# Patient Record
Sex: FEMALE | Race: WHITE | NOT HISPANIC OR LATINO | URBAN - METROPOLITAN AREA
[De-identification: names, ages, dates, MRNs, and addresses within clinical notes are randomized per-mention and may not be internally consistent; named-entity substitution may affect disease eponyms.]

---

## 2017-11-10 ENCOUNTER — INPATIENT (INPATIENT)
Facility: HOSPITAL | Age: 62
LOS: 5 days | Discharge: HOME | DRG: 808 | End: 2017-11-16
Attending: HOSPITALIST | Admitting: STUDENT IN AN ORGANIZED HEALTH CARE EDUCATION/TRAINING PROGRAM
Payer: COMMERCIAL

## 2017-11-10 VITALS — HEIGHT: 64 IN | WEIGHT: 138.01 LBS

## 2017-11-10 DIAGNOSIS — I10 ESSENTIAL (PRIMARY) HYPERTENSION: ICD-10-CM

## 2017-11-10 DIAGNOSIS — Z29.9 ENCOUNTER FOR PROPHYLACTIC MEASURES, UNSPECIFIED: ICD-10-CM

## 2017-11-10 DIAGNOSIS — E87.6 HYPOKALEMIA: ICD-10-CM

## 2017-11-10 DIAGNOSIS — D61.810 ANTINEOPLASTIC CHEMOTHERAPY INDUCED PANCYTOPENIA: ICD-10-CM

## 2017-11-10 DIAGNOSIS — C34.90 MALIGNANT NEOPLASM OF UNSPECIFIED PART OF UNSPECIFIED BRONCHUS OR LUNG: ICD-10-CM

## 2017-11-10 DIAGNOSIS — B37.0 CANDIDAL STOMATITIS: ICD-10-CM

## 2017-11-10 LAB
ALBUMIN SERPL ELPH-MCNC: 3.8 G/DL — SIGNIFICANT CHANGE UP (ref 3.3–5.2)
ALP SERPL-CCNC: 76 U/L — SIGNIFICANT CHANGE UP (ref 40–120)
ALT FLD-CCNC: 9 U/L — SIGNIFICANT CHANGE UP
ANION GAP SERPL CALC-SCNC: 15 MMOL/L — SIGNIFICANT CHANGE UP (ref 5–17)
ANION GAP SERPL CALC-SCNC: 17 MMOL/L — SIGNIFICANT CHANGE UP (ref 5–17)
ANISOCYTOSIS BLD QL: SLIGHT — SIGNIFICANT CHANGE UP
AST SERPL-CCNC: 24 U/L — SIGNIFICANT CHANGE UP
BILIRUB SERPL-MCNC: 0.6 MG/DL — SIGNIFICANT CHANGE UP (ref 0.4–2)
BLD GP AB SCN SERPL QL: SIGNIFICANT CHANGE UP
BUN SERPL-MCNC: 19 MG/DL — SIGNIFICANT CHANGE UP (ref 8–20)
BUN SERPL-MCNC: 20 MG/DL — SIGNIFICANT CHANGE UP (ref 8–20)
CALCIUM SERPL-MCNC: 9 MG/DL — SIGNIFICANT CHANGE UP (ref 8.6–10.2)
CALCIUM SERPL-MCNC: 9.8 MG/DL — SIGNIFICANT CHANGE UP (ref 8.6–10.2)
CHLORIDE SERPL-SCNC: 105 MMOL/L — SIGNIFICANT CHANGE UP (ref 98–107)
CHLORIDE SERPL-SCNC: 107 MMOL/L — SIGNIFICANT CHANGE UP (ref 98–107)
CO2 SERPL-SCNC: 23 MMOL/L — SIGNIFICANT CHANGE UP (ref 22–29)
CO2 SERPL-SCNC: 25 MMOL/L — SIGNIFICANT CHANGE UP (ref 22–29)
CREAT SERPL-MCNC: 1.51 MG/DL — HIGH (ref 0.5–1.3)
CREAT SERPL-MCNC: 1.57 MG/DL — HIGH (ref 0.5–1.3)
EOSINOPHIL NFR BLD AUTO: 4 % — SIGNIFICANT CHANGE UP (ref 0–5)
GLUCOSE SERPL-MCNC: 101 MG/DL — SIGNIFICANT CHANGE UP (ref 70–115)
GLUCOSE SERPL-MCNC: 125 MG/DL — HIGH (ref 70–115)
HCT VFR BLD CALC: 23 % — LOW (ref 37–47)
HGB BLD-MCNC: 7.9 G/DL — LOW (ref 12–16)
LACTATE BLDV-MCNC: 1.7 MMOL/L — SIGNIFICANT CHANGE UP (ref 0.5–2)
LYMPHOCYTES # BLD AUTO: 53 % — SIGNIFICANT CHANGE UP (ref 20–55)
MCHC RBC-ENTMCNC: 32.8 PG — HIGH (ref 27–31)
MCHC RBC-ENTMCNC: 34.3 G/DL — SIGNIFICANT CHANGE UP (ref 32–36)
MCV RBC AUTO: 95.4 FL — SIGNIFICANT CHANGE UP (ref 81–99)
MONOCYTES NFR BLD AUTO: 5 % — SIGNIFICANT CHANGE UP (ref 3–10)
NEUTROPHILS NFR BLD AUTO: 37 % — SIGNIFICANT CHANGE UP (ref 37–73)
OVALOCYTES BLD QL SMEAR: SLIGHT — SIGNIFICANT CHANGE UP
PLAT MORPH BLD: NORMAL — SIGNIFICANT CHANGE UP
PLATELET # BLD AUTO: 86 K/UL — LOW (ref 150–400)
POIKILOCYTOSIS BLD QL AUTO: SLIGHT — SIGNIFICANT CHANGE UP
POTASSIUM SERPL-MCNC: 2.6 MMOL/L — CRITICAL LOW (ref 3.5–5.3)
POTASSIUM SERPL-MCNC: 2.9 MMOL/L — CRITICAL LOW (ref 3.5–5.3)
POTASSIUM SERPL-SCNC: 2.6 MMOL/L — CRITICAL LOW (ref 3.5–5.3)
POTASSIUM SERPL-SCNC: 2.9 MMOL/L — CRITICAL LOW (ref 3.5–5.3)
PROT SERPL-MCNC: 6.8 G/DL — SIGNIFICANT CHANGE UP (ref 6.6–8.7)
RBC # BLD: 2.41 M/UL — LOW (ref 4.4–5.2)
RBC # FLD: 16.8 % — HIGH (ref 11–15.6)
RBC BLD AUTO: PRESENT — SIGNIFICANT CHANGE UP
SODIUM SERPL-SCNC: 145 MMOL/L — SIGNIFICANT CHANGE UP (ref 135–145)
SODIUM SERPL-SCNC: 147 MMOL/L — HIGH (ref 135–145)
TSH SERPL-MCNC: 3.27 UIU/ML — SIGNIFICANT CHANGE UP (ref 0.27–4.2)
TYPE + AB SCN PNL BLD: SIGNIFICANT CHANGE UP
VARIANT LYMPHS # BLD: 1 % — SIGNIFICANT CHANGE UP (ref 0–6)
WBC # BLD: 2.9 K/UL — LOW (ref 4.8–10.8)
WBC # FLD AUTO: 2.9 K/UL — LOW (ref 4.8–10.8)

## 2017-11-10 PROCEDURE — 99285 EMERGENCY DEPT VISIT HI MDM: CPT

## 2017-11-10 PROCEDURE — 99223 1ST HOSP IP/OBS HIGH 75: CPT | Mod: AI,GC

## 2017-11-10 RX ORDER — VANCOMYCIN HCL 1 G
1000 VIAL (EA) INTRAVENOUS ONCE
Qty: 0 | Refills: 0 | Status: COMPLETED | OUTPATIENT
Start: 2017-11-10 | End: 2017-11-10

## 2017-11-10 RX ORDER — NYSTATIN 500MM UNIT
500000 POWDER (EA) MISCELLANEOUS EVERY 6 HOURS
Qty: 0 | Refills: 0 | Status: DISCONTINUED | OUTPATIENT
Start: 2017-11-10 | End: 2017-11-11

## 2017-11-10 RX ORDER — CLOPIDOGREL BISULFATE 75 MG/1
75 TABLET, FILM COATED ORAL DAILY
Qty: 0 | Refills: 0 | Status: DISCONTINUED | OUTPATIENT
Start: 2017-11-10 | End: 2017-11-13

## 2017-11-10 RX ORDER — ATENOLOL 25 MG/1
50 TABLET ORAL
Qty: 0 | Refills: 0 | Status: DISCONTINUED | OUTPATIENT
Start: 2017-11-10 | End: 2017-11-10

## 2017-11-10 RX ORDER — LISINOPRIL 2.5 MG/1
20 TABLET ORAL DAILY
Qty: 0 | Refills: 0 | Status: DISCONTINUED | OUTPATIENT
Start: 2017-11-10 | End: 2017-11-11

## 2017-11-10 RX ORDER — ATORVASTATIN CALCIUM 80 MG/1
80 TABLET, FILM COATED ORAL AT BEDTIME
Qty: 0 | Refills: 0 | Status: DISCONTINUED | OUTPATIENT
Start: 2017-11-10 | End: 2017-11-16

## 2017-11-10 RX ORDER — ASPIRIN/CALCIUM CARB/MAGNESIUM 324 MG
81 TABLET ORAL DAILY
Qty: 0 | Refills: 0 | Status: DISCONTINUED | OUTPATIENT
Start: 2017-11-10 | End: 2017-11-13

## 2017-11-10 RX ORDER — ATENOLOL 25 MG/1
50 TABLET ORAL
Qty: 0 | Refills: 0 | Status: DISCONTINUED | OUTPATIENT
Start: 2017-11-10 | End: 2017-11-16

## 2017-11-10 RX ORDER — ONDANSETRON 8 MG/1
8 TABLET, FILM COATED ORAL
Qty: 0 | Refills: 0 | Status: DISCONTINUED | OUTPATIENT
Start: 2017-11-10 | End: 2017-11-10

## 2017-11-10 RX ORDER — POTASSIUM CHLORIDE 20 MEQ
10 PACKET (EA) ORAL ONCE
Qty: 0 | Refills: 0 | Status: COMPLETED | OUTPATIENT
Start: 2017-11-10 | End: 2017-11-10

## 2017-11-10 RX ORDER — LISINOPRIL 2.5 MG/1
20 TABLET ORAL DAILY
Qty: 0 | Refills: 0 | Status: DISCONTINUED | OUTPATIENT
Start: 2017-11-10 | End: 2017-11-10

## 2017-11-10 RX ORDER — SODIUM CHLORIDE 9 MG/ML
1000 INJECTION INTRAMUSCULAR; INTRAVENOUS; SUBCUTANEOUS
Qty: 0 | Refills: 0 | Status: DISCONTINUED | OUTPATIENT
Start: 2017-11-10 | End: 2017-11-11

## 2017-11-10 RX ORDER — ONDANSETRON 8 MG/1
4 TABLET, FILM COATED ORAL EVERY 6 HOURS
Qty: 0 | Refills: 0 | Status: DISCONTINUED | OUTPATIENT
Start: 2017-11-10 | End: 2017-11-16

## 2017-11-10 RX ORDER — OXYBUTYNIN CHLORIDE 5 MG
10 TABLET ORAL DAILY
Qty: 0 | Refills: 0 | Status: DISCONTINUED | OUTPATIENT
Start: 2017-11-10 | End: 2017-11-10

## 2017-11-10 RX ORDER — POTASSIUM CHLORIDE 20 MEQ
40 PACKET (EA) ORAL EVERY 4 HOURS
Qty: 0 | Refills: 0 | Status: COMPLETED | OUTPATIENT
Start: 2017-11-10 | End: 2017-11-11

## 2017-11-10 RX ORDER — PANTOPRAZOLE SODIUM 20 MG/1
40 TABLET, DELAYED RELEASE ORAL
Qty: 0 | Refills: 0 | Status: DISCONTINUED | OUTPATIENT
Start: 2017-11-10 | End: 2017-11-16

## 2017-11-10 RX ORDER — POTASSIUM CHLORIDE 20 MEQ
10 PACKET (EA) ORAL ONCE
Qty: 0 | Refills: 0 | Status: DISCONTINUED | OUTPATIENT
Start: 2017-11-10 | End: 2017-11-10

## 2017-11-10 RX ORDER — SODIUM CHLORIDE 9 MG/ML
1500 INJECTION INTRAMUSCULAR; INTRAVENOUS; SUBCUTANEOUS ONCE
Qty: 0 | Refills: 0 | Status: COMPLETED | OUTPATIENT
Start: 2017-11-10 | End: 2017-11-10

## 2017-11-10 RX ORDER — FOLIC ACID 0.8 MG
1 TABLET ORAL DAILY
Qty: 0 | Refills: 0 | Status: DISCONTINUED | OUTPATIENT
Start: 2017-11-10 | End: 2017-11-16

## 2017-11-10 RX ADMIN — SODIUM CHLORIDE 750 MILLILITER(S): 9 INJECTION INTRAMUSCULAR; INTRAVENOUS; SUBCUTANEOUS at 20:00

## 2017-11-10 RX ADMIN — Medication 250 MILLIGRAM(S): at 21:00

## 2017-11-10 NOTE — H&P ADULT - NSHPREVIEWOFSYSTEMS_GEN_ALL_CORE
CONSTITUTIONAL: admits weakness and generalized fatigue  HEENT: denies blurred vision, admits sore throat  SKIN: denies new lesions, rash but admits pallor  CARDIOVASCULAR: denies chest pain, chest pressure  RESPIRATORY: denies shortness of breath, sputum production  GASTROINTESTINAL: as per hpi  GENITOURINARY: denies dysuria, discharge  NEUROLOGICAL: denies numbness, headache  MUSCULOSKELETAL: generalized aches  HEMATOLOGIC: denies gross bleeding, bruising  LYMPHATICS: denies enlarged lymph nodes, extremity swelling  PSYCHIATRIC: denies recent changes in anxiety, depression  ENDOCRINOLOGIC: denies sweating, cold or heat intolerance

## 2017-11-10 NOTE — H&P ADULT - PROBLEM SELECTOR PLAN 5
Early ambulation with assistance  Pneumatic compression boots while on bed.  CrCl: 42.1 Continue home medications  Monitor BP  Dash diet Topical nystatin solution.  Soft diet Swish/swallow nystatin solution.  Soft diet.  Encourage po intake, f/u dietary recs.

## 2017-11-10 NOTE — H&P ADULT - NSHPSOCIALHISTORY_GEN_ALL_CORE
Lives with her two daughters.  Patient is a current smoker, last cigarette about 2 days ago.  Denies EtOH abuse or use of illicit drugs.

## 2017-11-10 NOTE — H&P ADULT - PROBLEM SELECTOR PLAN 3
Continue home medications  Monitor BP  Dash diet K+ repletion ordered by ED completed.  Repeat Serum K+ 2.6 mEq  K+ chloride 40mEq tablet ER q4h x3  Trend levels Repeat Serum K+ 2.6 mEq  K+ chloride 40mEq tablet ER q4h x3, pt prefers po supplementation rather than painful IV if possible. Reassess w/ BMP in the AM.  Check Mg level.   Trend levels

## 2017-11-10 NOTE — ED ADULT NURSE NOTE - OBJECTIVE STATEMENT
pt arrived from [facility] for weakness and thrush as per pt has not been feeling well very tired, pt with lung ca, sent over by md

## 2017-11-10 NOTE — H&P ADULT - NSHPLABSRESULTS_GEN_ALL_CORE
7.9    2.9   )-----------( 86       ( 10 Nov 2017 18:23 )             23.0     11-10    147<H>  |  105  |  20.0  ----------------------------<  101  2.9<LL>   |  25.0  |  1.57<H>    Ca    9.8      10 Nov 2017 18:23    TPro  6.8  /  Alb  3.8  /  TBili  0.6  /  DBili  x   /  AST  24  /  ALT  9   /  AlkPhos  76  11-10    Blood Gas Venous - Lactate: 1.7 mmoL/L (11.10.17 @ 18:16) Labs:               7.9    2.9   )-----------( 86       ( 10 Nov 2017 18:23 )             23.0     11-10    147<H>  |  105  |  20.0  ----------------------------<  101  2.9<LL>   |  25.0  |  1.57<H>    Ca    9.8      10 Nov 2017 18:23    TPro  6.8  /  Alb  3.8  /  TBili  0.6  /  DBili  x   /  AST  24  /  ALT  9   /  AlkPhos  76  11-10    Blood Gas Venous - Lactate: 1.7 mmoL/L (11.10.17 @ 18:16)

## 2017-11-10 NOTE — H&P ADULT - ATTENDING COMMENTS
I personally conducted a physical examination of the patient. I personally gathered the patient's history. I edited the above listed findings which were prepared by the listed resident physician. I personally discussed the plan of care with the patient. The questions and concerns were addressed to the best of my ability. The patient is in agreement with the listed treatment plan.     A/P: 63yo F w/ stage IV lung ca w/ brain metastasis currently undergoing chemotherapy, presents w/ reduced po intake, dehydration, symptomatic anemia. Recommend 1 unit pRBC's, unknown baseline H/H at this time, only record available from 2 years ago demonstrated hgb 14. No obvious clinical bleeding. Will hydrate and reassess need for further transfusion in the AM. Goal hgb>9.0 at this time but will attempt to get outpt labs for comparison. [facility] in [place] is outpt hem. Check iron, b12, folate in the AM.

## 2017-11-10 NOTE — H&P ADULT - ASSESSMENT
63 yo F with PMHs of HTN, CAD s/p 5 stents and Stage VI Lung Ca currently on chemotherapy with Ca-related fatigue and anemia with Hb >7.0 g/dL, dehydration secondary to poor oral intake, at the moment hemodynamically stable. Will be admitted for rehydration, consider PRBC transfusion and iron studies. Will consult Hem/Onc for recommendations. 61 yo F with PMHs of HTN, CAD s/p 5 stents and Stage VI Lung Ca currently on chemotherapy with Ca-related fatigue and anemia with Hb >7.0 g/dL, dehydration secondary to poor oral intake.

## 2017-11-10 NOTE — H&P ADULT - PROBLEM SELECTOR PLAN 6
Early ambulation with assistance  Pneumatic compression boots while on bed.  CrCl: 42.1 Continue home medications with holding parameters.  Dash diet

## 2017-11-10 NOTE — H&P ADULT - HISTORY OF PRESENT ILLNESS
61 yo F with PMHx of Hypertension, CAD and Lung Ca Stage IV with brain metastasis s/p radiation therapy on 08/2017 and on chemotherapy (last dose last week), complains of approximately 2 weeks of noticing progressive fatigue, being unable to ambulate more than a few feet without becoming short of breath accompanied by nausea, lack of appetite with decreased PO intake and occasional dysphagia.  She denies any chest pain, palpitations, dizziness, lightheadedness, cough, fever, dysuria, diarrhea, vomiting, hematuria, hematochezia, melena or easy bruising. 63 yo F with PMHx of Hypertension, CAD and Lung Ca Stage IV (diagnosed in 06/2017) with brain metastasis s/p radiation therapy, last session on 08/2017 and on chemotherapy (last session last week), patient receiving chemotherapy every 3 weeks (1 day each time), reports good tolerance. Complains of approximately 2 weeks of noticing progressive fatigue, being unable to ambulate more than a few feet without becoming short of breath accompanied by nausea, lack of appetite with decreased PO intake and occasional dysphagia.  She denies any chest pain, palpitations, dizziness, lightheadedness, cough, fever, dysuria, diarrhea, vomiting, hematuria, hematochezia, melena or easy bruising. 63 yo F with PMHx of Hypertension, CAD s/p PCI (2008) and Lung Ca Stage IV (diagnosed in 06/2017) with brain metastasis s/p radiation therapy, last session on 08/2017 and on chemotherapy (last session last week), patient receiving chemotherapy every 3 weeks (1 day each time), reports good tolerance to chemo. Complains of approximately 2 weeks of noticing progressive fatigue, being unable to ambulate more than a few feet without becoming short of breath accompanied by nausea, lack of appetite with decreased PO intake and occasional dysphagia.  She denies any chest pain, palpitations, dizziness, lightheadedness, cough, fever, dysuria, diarrhea, vomiting, hematuria, hematochezia, melena or easy bruising. 61 yo F with PMHx of Hypertension, CAD s/p PCI (2008) and Lung Ca Stage IV (diagnosed in 06/2017) with brain metastasis s/p radiation therapy, last session on 08/2017 and on chemotherapy (last session last week), patient receiving chemotherapy every 3 weeks (1 day each time), reports good tolerance to chemo. Complains of approximately 2 weeks of noticing progressive fatigue, being unable to ambulate more than a few feet without becoming short of breath accompanied by nausea, lack of appetite with decreased PO intake and occasional dysphagia.  She denies any chest pain, palpitations, dizziness, lightheadedness, cough, fever, dysuria, diarrhea, vomiting, hematuria, hematochezia, melena or easy bruising.     In the ED, pt was given 1g of vancomycin and 1.5L NS bolus.     PSHx: PCI in 2008

## 2017-11-10 NOTE — ED PROVIDER NOTE - CARE PLAN
Principal Discharge DX:	Malignant neoplasm of lung, unspecified laterality, unspecified part of lung

## 2017-11-10 NOTE — H&P ADULT - NSHPPHYSICALEXAM_GEN_ALL_CORE
Vital Signs Last 24 Hrs  T(C): 36.7 (10 Nov 2017 16:07), Max: 36.7 (10 Nov 2017 16:07)  T(F): 98 (10 Nov 2017 16:07), Max: 98 (10 Nov 2017 16:07)  HR: 65 (10 Nov 2017 16:07) (65 - 65)  BP: 98/65 (10 Nov 2017 16:07) (98/65 - 98/65)  BP(mean): --  RR: 18 (10 Nov 2017 16:07) (18 - 18)  SpO2: 100% (10 Nov 2017 16:07) (100% - 100%)    General: Elderly patient lying on stretcher with backboard at 45 degrees, in no acute distress, pleasant.  HEENT: Head: Normocephalic, atraumatic. Eyes: PERRL, EOMI, Nose: Nostrils are patent, no discharge and no hyperemia or hypertrophy of turbinates. Neck: Supple, no JVD, no carotid bruits, no lymphadenopathy.  Respiratory: Normal respiratory rate and effort, decreased breath sounds, no wheezing, rales or crackles.  GI: (+) bowel sounds, soft, nontender, nondistended, no organomegaly or masses.  : No CVA tenderness.  Extremities: No cyanosis, trace pedal edema, no joint tenderness, capillary refill <2s.  Neuro: Alert and oriented x3 No focal deficits.  Psych: Normal speech and affect, good eye contact. Vital Signs Last 24 Hrs  T(C): 36.7 (10 Nov 2017 16:07), Max: 36.7 (10 Nov 2017 16:07)  T(F): 98 (10 Nov 2017 16:07), Max: 98 (10 Nov 2017 16:07)  HR: 65 (10 Nov 2017 16:07) (65 - 65)  BP: 98/65 (10 Nov 2017 16:07) (98/65 - 98/65)  BP(mean): --  RR: 18 (10 Nov 2017 16:07) (18 - 18)  SpO2: 100% (10 Nov 2017 16:07) (100% - 100%)    General: Elderly patient lying on stretcher with backboard at 45 degrees, in no acute distress, pleasant.  HEENT: Head: Normocephalic, atraumatic. Eyes: PERRL, EOMI, Nose: Nostrils are patent, no discharge and no hyperemia or hypertrophy of turbinates. Throat: Thrush present on the dorsum of tongue and pharynx. Neck: Supple, no JVD, no carotid bruits, no lymphadenopathy.  Respiratory: Normal respiratory rate and effort, decreased breath sounds, no wheezing, rales or crackles.  GI: (+) bowel sounds, soft, nontender, nondistended, no organomegaly or masses.  : No CVA tenderness.  Extremities: No cyanosis, trace pedal edema, no joint tenderness, capillary refill <2s.  Neuro: Alert and oriented x3 No focal deficits.  Psych: Normal speech and affect, good eye contact. Vital Signs Last 24 Hrs  T(C): 36.7 (10 Nov 2017 16:07), Max: 36.7 (10 Nov 2017 16:07)  T(F): 98 (10 Nov 2017 16:07), Max: 98 (10 Nov 2017 16:07)  HR: 65 (10 Nov 2017 16:07) (65 - 65)  BP: 98/65 (10 Nov 2017 16:07) (98/65 - 98/65)  BP(mean): --  RR: 18 (10 Nov 2017 16:07) (18 - 18)  SpO2: 100% (10 Nov 2017 16:07) (100% - 100%)    General: Elderly patient lying on stretcher with backboard at 45 degrees, in no acute distress, pleasant.  HEENT: Head: Normocephalic, atraumatic. Eyes: PERRL, EOMI, Nose: Nostrils are patent, no discharge and no hyperemia or hypertrophy of turbinates. Throat: Thrush present on the dorsum of tongue and pharynx. Neck: Supple, no JVD, no carotid bruits, no lymphadenopathy.  Respiratory: Normal respiratory rate and effort, decreased breath sounds, no wheezing, rales or crackles.  GI: active bowel sounds, soft, nontender, nondistended, no organomegaly or masses.  : No CVA tenderness.  Extremities: No cyanosis, trace pedal edema, no joint tenderness, capillary refill <2s.  Neuro: Alert and oriented x3 No focal deficits.  Psych: Normal speech and affect, good eye contact.

## 2017-11-10 NOTE — H&P ADULT - PROBLEM SELECTOR PLAN 7
Early ambulation with assistance  Pneumatic compression boots while on bed.  CrCl: 42.1 Early ambulation with assistance  Pneumatic compression boots while on bed.  GI ppx

## 2017-11-10 NOTE — ED PROVIDER NOTE - OBJECTIVE STATEMENT
61 yo female pmh lung cancer with mets to brain s/p chemotherapy comes to ed with decreased oral intake for weeks; pt sent from [facility] in [place] with dehydration and low wbc; pt last chemotherapy  one week ago;

## 2017-11-10 NOTE — H&P ADULT - PROBLEM SELECTOR PLAN 4
K+ repletion ordered by ED passing at the moment.  Repeat Serum K+ Topical nystatin solution. Likely due to dehydration secondary to decreased PO intake.  Cr level decreasing after initial bolus  Continue IV hydration with normal saline.

## 2017-11-10 NOTE — H&P ADULT - PROBLEM SELECTOR PLAN 1
Patient with ongoing chemotherapy.  Hematology/Oncology consult.  Nutrition Consult Admit to medicine under hospitalist medical service.  Patient with ongoing chemotherapy.  Hematology/Oncology consult.  Nutrition Consult.  Present symptoms likely result of chemotherapy.

## 2017-11-11 DIAGNOSIS — N17.9 ACUTE KIDNEY FAILURE, UNSPECIFIED: ICD-10-CM

## 2017-11-11 LAB
ABO RH CONFIRMATION: SIGNIFICANT CHANGE UP
ANION GAP SERPL CALC-SCNC: 17 MMOL/L — SIGNIFICANT CHANGE UP (ref 5–17)
APPEARANCE UR: CLEAR — SIGNIFICANT CHANGE UP
BACTERIA # UR AUTO: ABNORMAL
BILIRUB UR-MCNC: NEGATIVE — SIGNIFICANT CHANGE UP
BUN SERPL-MCNC: 17 MG/DL — SIGNIFICANT CHANGE UP (ref 8–20)
CALCIUM SERPL-MCNC: 8.7 MG/DL — SIGNIFICANT CHANGE UP (ref 8.6–10.2)
CHLORIDE SERPL-SCNC: 109 MMOL/L — HIGH (ref 98–107)
CO2 SERPL-SCNC: 20 MMOL/L — LOW (ref 22–29)
COLOR SPEC: ABNORMAL
COMMENT - URINE: SIGNIFICANT CHANGE UP
CREAT SERPL-MCNC: 1.5 MG/DL — HIGH (ref 0.5–1.3)
DIFF PNL FLD: ABNORMAL
EPI CELLS # UR: SIGNIFICANT CHANGE UP
FERRITIN SERPL-MCNC: 800.3 NG/ML — HIGH (ref 11–306)
FOLATE SERPL-MCNC: 14.2 NG/ML — SIGNIFICANT CHANGE UP (ref 4–16)
GLUCOSE SERPL-MCNC: 94 MG/DL — SIGNIFICANT CHANGE UP (ref 70–115)
GLUCOSE UR QL: NEGATIVE MG/DL — SIGNIFICANT CHANGE UP
HCT VFR BLD CALC: 22.8 % — LOW (ref 37–47)
HGB BLD-MCNC: 7.9 G/DL — LOW (ref 12–16)
IRON SATN MFR SERPL: 174 UG/DL — HIGH (ref 37–145)
IRON SATN MFR SERPL: 89 % — HIGH (ref 14–50)
KETONES UR-MCNC: NEGATIVE — SIGNIFICANT CHANGE UP
LEUKOCYTE ESTERASE UR-ACNC: NEGATIVE — SIGNIFICANT CHANGE UP
MAGNESIUM SERPL-MCNC: 1.8 MG/DL — SIGNIFICANT CHANGE UP (ref 1.6–2.6)
MCHC RBC-ENTMCNC: 32 PG — HIGH (ref 27–31)
MCHC RBC-ENTMCNC: 34.6 G/DL — SIGNIFICANT CHANGE UP (ref 32–36)
MCV RBC AUTO: 92.3 FL — SIGNIFICANT CHANGE UP (ref 81–99)
NITRITE UR-MCNC: NEGATIVE — SIGNIFICANT CHANGE UP
PH UR: 7 — SIGNIFICANT CHANGE UP (ref 5–8)
PHOSPHATE SERPL-MCNC: 2.6 MG/DL — SIGNIFICANT CHANGE UP (ref 2.4–4.7)
PLATELET # BLD AUTO: 50 K/UL — LOW (ref 150–400)
POTASSIUM SERPL-MCNC: 3 MMOL/L — LOW (ref 3.5–5.3)
POTASSIUM SERPL-MCNC: 4 MMOL/L — SIGNIFICANT CHANGE UP (ref 3.5–5.3)
POTASSIUM SERPL-SCNC: 3 MMOL/L — LOW (ref 3.5–5.3)
POTASSIUM SERPL-SCNC: 4 MMOL/L — SIGNIFICANT CHANGE UP (ref 3.5–5.3)
PROT UR-MCNC: 100 MG/DL
RBC # BLD: 2.47 M/UL — LOW (ref 4.4–5.2)
RBC # FLD: 18.8 % — HIGH (ref 11–15.6)
RBC CASTS # UR COMP ASSIST: ABNORMAL /HPF (ref 0–4)
SODIUM SERPL-SCNC: 146 MMOL/L — HIGH (ref 135–145)
SP GR SPEC: 1 — LOW (ref 1.01–1.02)
TIBC SERPL-MCNC: 196 UG/DL — LOW (ref 220–430)
TRANSFERRIN SERPL-MCNC: 137 MG/DL — LOW (ref 192–382)
UROBILINOGEN FLD QL: NEGATIVE MG/DL — SIGNIFICANT CHANGE UP
VIT B12 SERPL-MCNC: 704 PG/ML — SIGNIFICANT CHANGE UP (ref 180–914)
WBC # BLD: 2.6 K/UL — LOW (ref 4.8–10.8)
WBC # FLD AUTO: 2.6 K/UL — LOW (ref 4.8–10.8)
WBC UR QL: SIGNIFICANT CHANGE UP

## 2017-11-11 PROCEDURE — 99233 SBSQ HOSP IP/OBS HIGH 50: CPT

## 2017-11-11 PROCEDURE — 93010 ELECTROCARDIOGRAM REPORT: CPT

## 2017-11-11 PROCEDURE — 71010: CPT | Mod: 26

## 2017-11-11 RX ORDER — FLUCONAZOLE 150 MG/1
200 TABLET ORAL ONCE
Qty: 0 | Refills: 0 | Status: COMPLETED | OUTPATIENT
Start: 2017-11-11 | End: 2017-11-11

## 2017-11-11 RX ORDER — LISINOPRIL 2.5 MG/1
20 TABLET ORAL ONCE
Qty: 0 | Refills: 0 | Status: COMPLETED | OUTPATIENT
Start: 2017-11-11 | End: 2017-11-11

## 2017-11-11 RX ORDER — LISINOPRIL 2.5 MG/1
40 TABLET ORAL DAILY
Qty: 0 | Refills: 0 | Status: DISCONTINUED | OUTPATIENT
Start: 2017-11-12 | End: 2017-11-16

## 2017-11-11 RX ORDER — FLUCONAZOLE 150 MG/1
100 TABLET ORAL DAILY
Qty: 0 | Refills: 0 | Status: DISCONTINUED | OUTPATIENT
Start: 2017-11-12 | End: 2017-11-16

## 2017-11-11 RX ORDER — POTASSIUM CHLORIDE 20 MEQ
40 PACKET (EA) ORAL EVERY 4 HOURS
Qty: 0 | Refills: 0 | Status: COMPLETED | OUTPATIENT
Start: 2017-11-11 | End: 2017-11-12

## 2017-11-11 RX ORDER — HYDRALAZINE HCL 50 MG
5 TABLET ORAL ONCE
Qty: 0 | Refills: 0 | Status: COMPLETED | OUTPATIENT
Start: 2017-11-11 | End: 2017-11-11

## 2017-11-11 RX ORDER — POTASSIUM CHLORIDE 20 MEQ
10 PACKET (EA) ORAL
Qty: 0 | Refills: 0 | Status: COMPLETED | OUTPATIENT
Start: 2017-11-11 | End: 2017-11-11

## 2017-11-11 RX ADMIN — LISINOPRIL 20 MILLIGRAM(S): 2.5 TABLET ORAL at 06:09

## 2017-11-11 RX ADMIN — FLUCONAZOLE 200 MILLIGRAM(S): 150 TABLET ORAL at 13:20

## 2017-11-11 RX ADMIN — ATENOLOL 50 MILLIGRAM(S): 25 TABLET ORAL at 06:09

## 2017-11-11 RX ADMIN — Medication 81 MILLIGRAM(S): at 12:17

## 2017-11-11 RX ADMIN — LISINOPRIL 20 MILLIGRAM(S): 2.5 TABLET ORAL at 12:17

## 2017-11-11 RX ADMIN — Medication 40 MILLIEQUIVALENT(S): at 06:09

## 2017-11-11 RX ADMIN — ATENOLOL 50 MILLIGRAM(S): 25 TABLET ORAL at 18:35

## 2017-11-11 RX ADMIN — Medication 100 MILLIEQUIVALENT(S): at 14:48

## 2017-11-11 RX ADMIN — ATORVASTATIN CALCIUM 80 MILLIGRAM(S): 80 TABLET, FILM COATED ORAL at 21:29

## 2017-11-11 RX ADMIN — ONDANSETRON 4 MILLIGRAM(S): 8 TABLET, FILM COATED ORAL at 00:10

## 2017-11-11 RX ADMIN — Medication 40 MILLIEQUIVALENT(S): at 21:29

## 2017-11-11 RX ADMIN — PANTOPRAZOLE SODIUM 40 MILLIGRAM(S): 20 TABLET, DELAYED RELEASE ORAL at 06:09

## 2017-11-11 RX ADMIN — Medication 40 MILLIEQUIVALENT(S): at 16:52

## 2017-11-11 RX ADMIN — Medication 5 MILLIGRAM(S): at 14:46

## 2017-11-11 RX ADMIN — Medication 1 MILLIGRAM(S): at 12:18

## 2017-11-11 RX ADMIN — Medication 100 MILLIEQUIVALENT(S): at 16:44

## 2017-11-11 RX ADMIN — Medication 100 MILLIEQUIVALENT(S): at 18:31

## 2017-11-11 RX ADMIN — Medication 40 MILLIEQUIVALENT(S): at 09:37

## 2017-11-11 RX ADMIN — SODIUM CHLORIDE 100 MILLILITER(S): 9 INJECTION INTRAMUSCULAR; INTRAVENOUS; SUBCUTANEOUS at 00:16

## 2017-11-11 RX ADMIN — CLOPIDOGREL BISULFATE 75 MILLIGRAM(S): 75 TABLET, FILM COATED ORAL at 12:17

## 2017-11-11 RX ADMIN — Medication 40 MILLIEQUIVALENT(S): at 01:21

## 2017-11-12 LAB
ANION GAP SERPL CALC-SCNC: 16 MMOL/L — SIGNIFICANT CHANGE UP (ref 5–17)
BUN SERPL-MCNC: 14 MG/DL — SIGNIFICANT CHANGE UP (ref 8–20)
CALCIUM SERPL-MCNC: 9.2 MG/DL — SIGNIFICANT CHANGE UP (ref 8.6–10.2)
CHLORIDE SERPL-SCNC: 114 MMOL/L — HIGH (ref 98–107)
CO2 SERPL-SCNC: 19 MMOL/L — LOW (ref 22–29)
CREAT SERPL-MCNC: 1.36 MG/DL — HIGH (ref 0.5–1.3)
CULTURE RESULTS: NO GROWTH — SIGNIFICANT CHANGE UP
GLUCOSE SERPL-MCNC: 82 MG/DL — SIGNIFICANT CHANGE UP (ref 70–115)
HCT VFR BLD CALC: 29 % — LOW (ref 37–47)
HGB BLD-MCNC: 10.1 G/DL — LOW (ref 12–16)
MAGNESIUM SERPL-MCNC: 1.7 MG/DL — SIGNIFICANT CHANGE UP (ref 1.6–2.6)
MCHC RBC-ENTMCNC: 32 PG — HIGH (ref 27–31)
MCHC RBC-ENTMCNC: 34.8 G/DL — SIGNIFICANT CHANGE UP (ref 32–36)
MCV RBC AUTO: 91.8 FL — SIGNIFICANT CHANGE UP (ref 81–99)
PHOSPHATE SERPL-MCNC: 1.7 MG/DL — LOW (ref 2.4–4.7)
PLATELET # BLD AUTO: 40 K/UL — LOW (ref 150–400)
POTASSIUM SERPL-MCNC: 4.3 MMOL/L — SIGNIFICANT CHANGE UP (ref 3.5–5.3)
POTASSIUM SERPL-SCNC: 4.3 MMOL/L — SIGNIFICANT CHANGE UP (ref 3.5–5.3)
RBC # BLD: 3.16 M/UL — LOW (ref 4.4–5.2)
RBC # FLD: 20.4 % — HIGH (ref 11–15.6)
SODIUM SERPL-SCNC: 149 MMOL/L — HIGH (ref 135–145)
SPECIMEN SOURCE: SIGNIFICANT CHANGE UP
WBC # BLD: 2.4 K/UL — LOW (ref 4.8–10.8)
WBC # FLD AUTO: 2.4 K/UL — LOW (ref 4.8–10.8)

## 2017-11-12 PROCEDURE — 99233 SBSQ HOSP IP/OBS HIGH 50: CPT

## 2017-11-12 PROCEDURE — 76775 US EXAM ABDO BACK WALL LIM: CPT | Mod: 26

## 2017-11-12 RX ORDER — HYDRALAZINE HCL 50 MG
10 TABLET ORAL ONCE
Qty: 0 | Refills: 0 | Status: COMPLETED | OUTPATIENT
Start: 2017-11-12 | End: 2017-11-12

## 2017-11-12 RX ORDER — SODIUM CHLORIDE 9 MG/ML
1000 INJECTION, SOLUTION INTRAVENOUS
Qty: 0 | Refills: 0 | Status: DISCONTINUED | OUTPATIENT
Start: 2017-11-12 | End: 2017-11-13

## 2017-11-12 RX ORDER — SODIUM,POTASSIUM PHOSPHATES 278-250MG
1 POWDER IN PACKET (EA) ORAL ONCE
Qty: 0 | Refills: 0 | Status: DISCONTINUED | OUTPATIENT
Start: 2017-11-12 | End: 2017-11-12

## 2017-11-12 RX ORDER — SODIUM,POTASSIUM PHOSPHATES 278-250MG
1 POWDER IN PACKET (EA) ORAL ONCE
Qty: 0 | Refills: 0 | Status: COMPLETED | OUTPATIENT
Start: 2017-11-12 | End: 2017-11-12

## 2017-11-12 RX ADMIN — Medication 1 MILLIGRAM(S): at 11:28

## 2017-11-12 RX ADMIN — Medication 81 MILLIGRAM(S): at 11:28

## 2017-11-12 RX ADMIN — CLOPIDOGREL BISULFATE 75 MILLIGRAM(S): 75 TABLET, FILM COATED ORAL at 11:28

## 2017-11-12 RX ADMIN — Medication 1 TABLET(S): at 11:28

## 2017-11-12 RX ADMIN — ATORVASTATIN CALCIUM 80 MILLIGRAM(S): 80 TABLET, FILM COATED ORAL at 22:10

## 2017-11-12 RX ADMIN — LISINOPRIL 40 MILLIGRAM(S): 2.5 TABLET ORAL at 05:31

## 2017-11-12 RX ADMIN — SODIUM CHLORIDE 100 MILLILITER(S): 9 INJECTION, SOLUTION INTRAVENOUS at 10:50

## 2017-11-12 RX ADMIN — FLUCONAZOLE 100 MILLIGRAM(S): 150 TABLET ORAL at 11:28

## 2017-11-12 RX ADMIN — ATENOLOL 50 MILLIGRAM(S): 25 TABLET ORAL at 18:18

## 2017-11-12 RX ADMIN — Medication 40 MILLIEQUIVALENT(S): at 05:31

## 2017-11-12 RX ADMIN — PANTOPRAZOLE SODIUM 40 MILLIGRAM(S): 20 TABLET, DELAYED RELEASE ORAL at 05:31

## 2017-11-12 RX ADMIN — SODIUM CHLORIDE 100 MILLILITER(S): 9 INJECTION, SOLUTION INTRAVENOUS at 22:10

## 2017-11-12 RX ADMIN — ATENOLOL 50 MILLIGRAM(S): 25 TABLET ORAL at 05:31

## 2017-11-12 RX ADMIN — Medication 10 MILLIGRAM(S): at 09:03

## 2017-11-13 LAB
ANION GAP SERPL CALC-SCNC: 14 MMOL/L — SIGNIFICANT CHANGE UP (ref 5–17)
BUN SERPL-MCNC: 11 MG/DL — SIGNIFICANT CHANGE UP (ref 8–20)
CALCIUM SERPL-MCNC: 8.8 MG/DL — SIGNIFICANT CHANGE UP (ref 8.6–10.2)
CHLORIDE SERPL-SCNC: 110 MMOL/L — HIGH (ref 98–107)
CO2 SERPL-SCNC: 21 MMOL/L — LOW (ref 22–29)
CREAT SERPL-MCNC: 1.27 MG/DL — SIGNIFICANT CHANGE UP (ref 0.5–1.3)
GLUCOSE SERPL-MCNC: 80 MG/DL — SIGNIFICANT CHANGE UP (ref 70–115)
HCT VFR BLD CALC: 26.9 % — LOW (ref 37–47)
HGB BLD-MCNC: 8.8 G/DL — LOW (ref 12–16)
MAGNESIUM SERPL-MCNC: 1.4 MG/DL — LOW (ref 1.6–2.6)
MCHC RBC-ENTMCNC: 30.1 PG — SIGNIFICANT CHANGE UP (ref 27–31)
MCHC RBC-ENTMCNC: 32.7 G/DL — SIGNIFICANT CHANGE UP (ref 32–36)
MCV RBC AUTO: 92.1 FL — SIGNIFICANT CHANGE UP (ref 81–99)
PHOSPHATE SERPL-MCNC: 2.4 MG/DL — SIGNIFICANT CHANGE UP (ref 2.4–4.7)
PLATELET # BLD AUTO: 25 K/UL — CRITICAL LOW (ref 150–400)
POTASSIUM SERPL-MCNC: 3.5 MMOL/L — SIGNIFICANT CHANGE UP (ref 3.5–5.3)
POTASSIUM SERPL-SCNC: 3.5 MMOL/L — SIGNIFICANT CHANGE UP (ref 3.5–5.3)
RBC # BLD: 2.92 M/UL — LOW (ref 4.4–5.2)
RBC # FLD: 19.3 % — HIGH (ref 11–15.6)
SODIUM SERPL-SCNC: 145 MMOL/L — SIGNIFICANT CHANGE UP (ref 135–145)
WBC # BLD: 1.7 K/UL — LOW (ref 4.8–10.8)
WBC # FLD AUTO: 1.7 K/UL — LOW (ref 4.8–10.8)

## 2017-11-13 PROCEDURE — 99233 SBSQ HOSP IP/OBS HIGH 50: CPT

## 2017-11-13 RX ORDER — MAGNESIUM OXIDE 400 MG ORAL TABLET 241.3 MG
400 TABLET ORAL
Qty: 0 | Refills: 0 | Status: COMPLETED | OUTPATIENT
Start: 2017-11-13 | End: 2017-11-14

## 2017-11-13 RX ADMIN — ATENOLOL 50 MILLIGRAM(S): 25 TABLET ORAL at 06:40

## 2017-11-13 RX ADMIN — ONDANSETRON 4 MILLIGRAM(S): 8 TABLET, FILM COATED ORAL at 15:42

## 2017-11-13 RX ADMIN — MAGNESIUM OXIDE 400 MG ORAL TABLET 400 MILLIGRAM(S): 241.3 TABLET ORAL at 17:22

## 2017-11-13 RX ADMIN — ATORVASTATIN CALCIUM 80 MILLIGRAM(S): 80 TABLET, FILM COATED ORAL at 21:14

## 2017-11-13 RX ADMIN — ATENOLOL 50 MILLIGRAM(S): 25 TABLET ORAL at 17:22

## 2017-11-13 RX ADMIN — MAGNESIUM OXIDE 400 MG ORAL TABLET 400 MILLIGRAM(S): 241.3 TABLET ORAL at 12:56

## 2017-11-13 RX ADMIN — PANTOPRAZOLE SODIUM 40 MILLIGRAM(S): 20 TABLET, DELAYED RELEASE ORAL at 06:40

## 2017-11-13 RX ADMIN — FLUCONAZOLE 100 MILLIGRAM(S): 150 TABLET ORAL at 12:56

## 2017-11-13 RX ADMIN — LISINOPRIL 40 MILLIGRAM(S): 2.5 TABLET ORAL at 06:41

## 2017-11-13 RX ADMIN — Medication 1 MILLIGRAM(S): at 12:56

## 2017-11-13 NOTE — PHYSICAL THERAPY INITIAL EVALUATION ADULT - GAIT PATTERN USED, PT EVAL
trial without device c notable unsteadiness c A to recover, decreased gait velocity and activity tolerance

## 2017-11-13 NOTE — DIETITIAN INITIAL EVALUATION ADULT. - PROBLEM SELECTOR PLAN 2
stat 1 unti PRBC transfusion. Pt consented for transfusion.   Neutrophil count > 1,000 cells/uL  No neutropenic precautions at this time.   F/u CBC in AM.  Iron panel, B12, folate in the AM.   Maintain Plt >20k

## 2017-11-13 NOTE — PHYSICAL THERAPY INITIAL EVALUATION ADULT - PERTINENT HX OF CURRENT PROBLEM, REHAB EVAL
pt presents to [facility] due to symptomatic anemia, pancytopenia, hx of brain mets

## 2017-11-13 NOTE — DIETITIAN INITIAL EVALUATION ADULT. - PROBLEM SELECTOR PLAN 3
Repeat Serum K+ 2.6 mEq  K+ chloride 40mEq tablet ER q4h x3, pt prefers po supplementation rather than painful IV if possible. Reassess w/ BMP in the AM.  Check Mg level.   Trend levels

## 2017-11-13 NOTE — PHYSICAL THERAPY INITIAL EVALUATION ADULT - CRITERIA FOR SKILLED THERAPEUTIC INTERVENTIONS
rehab potential/predicted duration of therapy intervention/anticipated equipment needs at discharge/impairments found/therapy frequency/functional limitations in following categories/anticipated discharge recommendation

## 2017-11-13 NOTE — DIETITIAN INITIAL EVALUATION ADULT. - PROBLEM SELECTOR PLAN 4
Likely due to dehydration secondary to decreased PO intake.  Cr level decreasing after initial bolus  Continue IV hydration with normal saline.

## 2017-11-13 NOTE — CHART NOTE - NSCHARTNOTEFT_GEN_A_CORE
Upon Nutritional Assessment by the Registered Dietitian your patient was determined to meet criteria / has evidence of the following diagnosis/diagnoses:          [ ]  Mild Protein Calorie Malnutrition        [ ]  Moderate Protein Calorie Malnutrition        [x ] Severe Protein Calorie Malnutrition        [ ] Unspecified Protein Calorie Malnutrition        [ ] Underweight / BMI <19        [ ] Morbid Obesity / BMI > 40      Findings as based on:  •  Comprehensive nutrition assessment and consultation  •  Calorie counts (nutrient intake analysis)  •  Food acceptance and intake status from observations by staff  •  Follow up  •  Patient education  •  Intervention secondary to interdisciplinary rounds  •   concerns      Treatment:    The following diet has been recommended:    1) appetite stimulant marinol?  2)    PROVIDER Section:     By signing this assessment you are acknowledging and agree with the diagnosis/diagnoses assigned by the Registered Dietitian    Comments:

## 2017-11-14 LAB
ACANTHOCYTES BLD QL SMEAR: SLIGHT — SIGNIFICANT CHANGE UP
ANION GAP SERPL CALC-SCNC: 15 MMOL/L — SIGNIFICANT CHANGE UP (ref 5–17)
ANISOCYTOSIS BLD QL: SLIGHT — SIGNIFICANT CHANGE UP
BASOPHILS NFR BLD AUTO: 1 % — SIGNIFICANT CHANGE UP (ref 0–2)
BUN SERPL-MCNC: 11 MG/DL — SIGNIFICANT CHANGE UP (ref 8–20)
CALCIUM SERPL-MCNC: 9 MG/DL — SIGNIFICANT CHANGE UP (ref 8.6–10.2)
CHLORIDE SERPL-SCNC: 105 MMOL/L — SIGNIFICANT CHANGE UP (ref 98–107)
CO2 SERPL-SCNC: 23 MMOL/L — SIGNIFICANT CHANGE UP (ref 22–29)
CREAT SERPL-MCNC: 1.35 MG/DL — HIGH (ref 0.5–1.3)
ELLIPTOCYTES BLD QL SMEAR: SLIGHT — SIGNIFICANT CHANGE UP
EOSINOPHIL # BLD AUTO: 0.1 K/UL — SIGNIFICANT CHANGE UP (ref 0–0.5)
EOSINOPHIL NFR BLD AUTO: 5 % — SIGNIFICANT CHANGE UP (ref 0–5)
GLUCOSE SERPL-MCNC: 87 MG/DL — SIGNIFICANT CHANGE UP (ref 70–115)
HCT VFR BLD CALC: 24.5 % — LOW (ref 37–47)
HCT VFR BLD CALC: 25.1 % — LOW (ref 37–47)
HGB BLD-MCNC: 8.5 G/DL — LOW (ref 12–16)
HGB BLD-MCNC: 8.5 G/DL — LOW (ref 12–16)
LYMPHOCYTES # BLD AUTO: 1.1 K/UL — SIGNIFICANT CHANGE UP (ref 1–4.8)
LYMPHOCYTES # BLD AUTO: 64 % — HIGH (ref 20–55)
MACROCYTES BLD QL: SLIGHT — SIGNIFICANT CHANGE UP
MAGNESIUM SERPL-MCNC: 1.3 MG/DL — LOW (ref 1.6–2.6)
MCHC RBC-ENTMCNC: 31 PG — SIGNIFICANT CHANGE UP (ref 27–31)
MCHC RBC-ENTMCNC: 31 PG — SIGNIFICANT CHANGE UP (ref 27–31)
MCHC RBC-ENTMCNC: 33.9 G/DL — SIGNIFICANT CHANGE UP (ref 32–36)
MCHC RBC-ENTMCNC: 34.7 G/DL — SIGNIFICANT CHANGE UP (ref 32–36)
MCV RBC AUTO: 89.4 FL — SIGNIFICANT CHANGE UP (ref 81–99)
MCV RBC AUTO: 91.6 FL — SIGNIFICANT CHANGE UP (ref 81–99)
MICROCYTES BLD QL: SLIGHT — SIGNIFICANT CHANGE UP
MONOCYTES # BLD AUTO: 0.2 K/UL — SIGNIFICANT CHANGE UP (ref 0–0.8)
MONOCYTES NFR BLD AUTO: 11 % — HIGH (ref 3–10)
NEUTROPHILS # BLD AUTO: 0.2 K/UL — LOW (ref 1.8–8)
NEUTROPHILS NFR BLD AUTO: 17 % — LOW (ref 37–73)
OVALOCYTES BLD QL SMEAR: SLIGHT — SIGNIFICANT CHANGE UP
PHOSPHATE SERPL-MCNC: 2.6 MG/DL — SIGNIFICANT CHANGE UP (ref 2.4–4.7)
PLAT MORPH BLD: NORMAL — SIGNIFICANT CHANGE UP
PLATELET # BLD AUTO: 18 K/UL — CRITICAL LOW (ref 150–400)
PLATELET # BLD AUTO: 25 K/UL — CRITICAL LOW (ref 150–400)
POIKILOCYTOSIS BLD QL AUTO: SLIGHT — SIGNIFICANT CHANGE UP
POTASSIUM SERPL-MCNC: 3.5 MMOL/L — SIGNIFICANT CHANGE UP (ref 3.5–5.3)
POTASSIUM SERPL-SCNC: 3.5 MMOL/L — SIGNIFICANT CHANGE UP (ref 3.5–5.3)
RBC # BLD: 2.74 M/UL — LOW (ref 4.4–5.2)
RBC # BLD: 2.74 M/UL — LOW (ref 4.4–5.2)
RBC # FLD: 18.6 % — HIGH (ref 11–15.6)
RBC # FLD: 19.1 % — HIGH (ref 11–15.6)
RBC BLD AUTO: ABNORMAL
SODIUM SERPL-SCNC: 143 MMOL/L — SIGNIFICANT CHANGE UP (ref 135–145)
SPHEROCYTES BLD QL SMEAR: SLIGHT — SIGNIFICANT CHANGE UP
VARIANT LYMPHS # BLD: 2 % — SIGNIFICANT CHANGE UP (ref 0–6)
WBC # BLD: 1.2 K/UL — LOW (ref 4.8–10.8)
WBC # BLD: 1.6 K/UL — LOW (ref 4.8–10.8)
WBC # FLD AUTO: 1.2 K/UL — LOW (ref 4.8–10.8)
WBC # FLD AUTO: 1.6 K/UL — LOW (ref 4.8–10.8)

## 2017-11-14 PROCEDURE — 99233 SBSQ HOSP IP/OBS HIGH 50: CPT

## 2017-11-14 RX ORDER — MAGNESIUM SULFATE 500 MG/ML
2 VIAL (ML) INJECTION ONCE
Qty: 0 | Refills: 0 | Status: COMPLETED | OUTPATIENT
Start: 2017-11-14 | End: 2017-11-14

## 2017-11-14 RX ADMIN — Medication 50 GRAM(S): at 11:38

## 2017-11-14 RX ADMIN — LISINOPRIL 40 MILLIGRAM(S): 2.5 TABLET ORAL at 05:10

## 2017-11-14 RX ADMIN — FLUCONAZOLE 100 MILLIGRAM(S): 150 TABLET ORAL at 11:39

## 2017-11-14 RX ADMIN — ATENOLOL 50 MILLIGRAM(S): 25 TABLET ORAL at 05:10

## 2017-11-14 RX ADMIN — MAGNESIUM OXIDE 400 MG ORAL TABLET 400 MILLIGRAM(S): 241.3 TABLET ORAL at 09:15

## 2017-11-14 RX ADMIN — ATENOLOL 50 MILLIGRAM(S): 25 TABLET ORAL at 17:23

## 2017-11-14 RX ADMIN — Medication 1 MILLIGRAM(S): at 11:38

## 2017-11-14 RX ADMIN — ATORVASTATIN CALCIUM 80 MILLIGRAM(S): 80 TABLET, FILM COATED ORAL at 21:13

## 2017-11-14 RX ADMIN — PANTOPRAZOLE SODIUM 40 MILLIGRAM(S): 20 TABLET, DELAYED RELEASE ORAL at 05:10

## 2017-11-15 LAB
ANION GAP SERPL CALC-SCNC: 13 MMOL/L — SIGNIFICANT CHANGE UP (ref 5–17)
BUN SERPL-MCNC: 10 MG/DL — SIGNIFICANT CHANGE UP (ref 8–20)
CALCIUM SERPL-MCNC: 9.3 MG/DL — SIGNIFICANT CHANGE UP (ref 8.6–10.2)
CHLORIDE SERPL-SCNC: 104 MMOL/L — SIGNIFICANT CHANGE UP (ref 98–107)
CO2 SERPL-SCNC: 25 MMOL/L — SIGNIFICANT CHANGE UP (ref 22–29)
CREAT SERPL-MCNC: 1.4 MG/DL — HIGH (ref 0.5–1.3)
CULTURE RESULTS: SIGNIFICANT CHANGE UP
CULTURE RESULTS: SIGNIFICANT CHANGE UP
GLUCOSE SERPL-MCNC: 88 MG/DL — SIGNIFICANT CHANGE UP (ref 70–115)
HCT VFR BLD CALC: 25.6 % — LOW (ref 37–47)
HGB BLD-MCNC: 8.8 G/DL — LOW (ref 12–16)
MAGNESIUM SERPL-MCNC: 1.9 MG/DL — SIGNIFICANT CHANGE UP (ref 1.6–2.6)
MCHC RBC-ENTMCNC: 30.9 PG — SIGNIFICANT CHANGE UP (ref 27–31)
MCHC RBC-ENTMCNC: 34.4 G/DL — SIGNIFICANT CHANGE UP (ref 32–36)
MCV RBC AUTO: 89.8 FL — SIGNIFICANT CHANGE UP (ref 81–99)
PHOSPHATE SERPL-MCNC: 3 MG/DL — SIGNIFICANT CHANGE UP (ref 2.4–4.7)
PLATELET # BLD AUTO: 11 K/UL — CRITICAL LOW (ref 150–400)
POTASSIUM SERPL-MCNC: 3.4 MMOL/L — LOW (ref 3.5–5.3)
POTASSIUM SERPL-SCNC: 3.4 MMOL/L — LOW (ref 3.5–5.3)
RBC # BLD: 2.85 M/UL — LOW (ref 4.4–5.2)
RBC # FLD: 18.4 % — HIGH (ref 11–15.6)
SODIUM SERPL-SCNC: 142 MMOL/L — SIGNIFICANT CHANGE UP (ref 135–145)
SPECIMEN SOURCE: SIGNIFICANT CHANGE UP
SPECIMEN SOURCE: SIGNIFICANT CHANGE UP
WBC # BLD: 1.4 K/UL — LOW (ref 4.8–10.8)
WBC # FLD AUTO: 1.4 K/UL — LOW (ref 4.8–10.8)

## 2017-11-15 PROCEDURE — 99233 SBSQ HOSP IP/OBS HIGH 50: CPT

## 2017-11-15 RX ORDER — POTASSIUM CHLORIDE 20 MEQ
10 PACKET (EA) ORAL
Qty: 0 | Refills: 0 | Status: DISCONTINUED | OUTPATIENT
Start: 2017-11-15 | End: 2017-11-15

## 2017-11-15 RX ORDER — POTASSIUM CHLORIDE 20 MEQ
40 PACKET (EA) ORAL EVERY 4 HOURS
Qty: 0 | Refills: 0 | Status: COMPLETED | OUTPATIENT
Start: 2017-11-15 | End: 2017-11-15

## 2017-11-15 RX ADMIN — PANTOPRAZOLE SODIUM 40 MILLIGRAM(S): 20 TABLET, DELAYED RELEASE ORAL at 05:10

## 2017-11-15 RX ADMIN — Medication 100 MILLIEQUIVALENT(S): at 09:59

## 2017-11-15 RX ADMIN — Medication 40 MILLIEQUIVALENT(S): at 14:52

## 2017-11-15 RX ADMIN — Medication 40 MILLIEQUIVALENT(S): at 17:31

## 2017-11-15 RX ADMIN — Medication 1 MILLIGRAM(S): at 11:59

## 2017-11-15 RX ADMIN — ATENOLOL 50 MILLIGRAM(S): 25 TABLET ORAL at 17:31

## 2017-11-15 RX ADMIN — LISINOPRIL 40 MILLIGRAM(S): 2.5 TABLET ORAL at 05:10

## 2017-11-15 RX ADMIN — Medication 40 MILLIEQUIVALENT(S): at 10:24

## 2017-11-15 RX ADMIN — FLUCONAZOLE 100 MILLIGRAM(S): 150 TABLET ORAL at 11:59

## 2017-11-15 RX ADMIN — ATORVASTATIN CALCIUM 80 MILLIGRAM(S): 80 TABLET, FILM COATED ORAL at 21:25

## 2017-11-15 RX ADMIN — ATENOLOL 50 MILLIGRAM(S): 25 TABLET ORAL at 05:10

## 2017-11-15 NOTE — CHART NOTE - NSCHARTNOTEFT_GEN_A_CORE
Source: Patient [x ]  Family [ ]   other [ ]    Current Diet: DASH/ TLC     Patient reports [ ] nausea  [ ] vomiting [ ] diarrhea [ ] constipation  [ ]chewing problems [ ] swallowing issues  [ ] other:     PO intake:  < 50% [ ]   50-75%  [ x  Enteral /Parenteral Nutrition:     Current Weight:     % Weight Change     Pertinent Medications: MEDICATIONS  (STANDING):  ATENolol  Tablet 50 milliGRAM(s) Oral two times a day  atorvastatin 80 milliGRAM(s) Oral at bedtime  fluconAZOLE   Tablet 100 milliGRAM(s) Oral daily  folic acid 1 milliGRAM(s) Oral daily  lisinopril 40 milliGRAM(s) Oral daily  pantoprazole    Tablet 40 milliGRAM(s) Oral before breakfast  potassium chloride    Tablet ER 40 milliEquivalent(s) Oral every 4 hours    MEDICATIONS  (PRN):  ondansetron Injectable 4 milliGRAM(s) IV Push every 6 hours PRN Nausea and/or Vomiting    Pertinent Labs: CBC Full  -  ( 15 Nov 2017 08:30 )  WBC Count : 1.4 K/uL  Hemoglobin : 8.8 g/dL  Hematocrit : 25.6 %  Platelet Count - Automated : 11 K/uL  Mean Cell Volume : 89.8 fl  Mean Cell Hemoglobin : 30.9 pg  Mean Cell Hemoglobin Concentration : 34.4 g/dL  Auto Neutrophil # : x  Auto Lymphocyte # : x  Auto Monocyte # : x  Auto Eosinophil # : x  Auto Basophil # : x  Auto Neutrophil % : x  Auto Lymphocyte % : x  Auto Monocyte % : x  Auto Eosinophil % : x  Auto Basophil % : x          Skin:     Nutrition focused physical exam conducted - found signs of malnutrition [ ]absent [ x]present    Subcutaneous fat loss: [x ] Orbital fat pads region, [x ]Buccal fat region, [ ]Triceps region,  [ ]Ribs region    Muscle wasting: [ ]Temples region, [ ]Clavicle region, [x ]Shoulder region, [x ]Scapula region, [ ]Interosseous region,  [ ]thigh region, [ ]Calf region    Estimated Needs:   [x ] no change since previous assessment  [ ] recalculated:     Current Nutrition Diagnosis: Pt presents at risk secondary to malnutrition, related to insufficient PO intake in the setting of Ca,  as evidenced by 17% weight loss, PO ~25%, and physical findings.  PO now improved       Recommendations:  Continue plan    Monitoring and Evaluation:   [ ] PO intake [ ] Tolerance to diet prescription [X] Weights  [X] Follow up per protocol [X] Labs:

## 2017-11-16 ENCOUNTER — TRANSCRIPTION ENCOUNTER (OUTPATIENT)
Age: 62
End: 2017-11-16

## 2017-11-16 VITALS
HEART RATE: 52 BPM | OXYGEN SATURATION: 100 % | DIASTOLIC BLOOD PRESSURE: 75 MMHG | RESPIRATION RATE: 18 BRPM | TEMPERATURE: 98 F | SYSTOLIC BLOOD PRESSURE: 140 MMHG

## 2017-11-16 LAB
ANION GAP SERPL CALC-SCNC: 13 MMOL/L — SIGNIFICANT CHANGE UP (ref 5–17)
BUN SERPL-MCNC: 7 MG/DL — LOW (ref 8–20)
CALCIUM SERPL-MCNC: 9.5 MG/DL — SIGNIFICANT CHANGE UP (ref 8.6–10.2)
CHLORIDE SERPL-SCNC: 108 MMOL/L — HIGH (ref 98–107)
CO2 SERPL-SCNC: 24 MMOL/L — SIGNIFICANT CHANGE UP (ref 22–29)
CREAT SERPL-MCNC: 1.31 MG/DL — HIGH (ref 0.5–1.3)
GLUCOSE SERPL-MCNC: 91 MG/DL — SIGNIFICANT CHANGE UP (ref 70–115)
HCT VFR BLD CALC: 27.1 % — LOW (ref 37–47)
HGB BLD-MCNC: 9.1 G/DL — LOW (ref 12–16)
MAGNESIUM SERPL-MCNC: 1.7 MG/DL — SIGNIFICANT CHANGE UP (ref 1.6–2.6)
MCHC RBC-ENTMCNC: 30.6 PG — SIGNIFICANT CHANGE UP (ref 27–31)
MCHC RBC-ENTMCNC: 33.6 G/DL — SIGNIFICANT CHANGE UP (ref 32–36)
MCV RBC AUTO: 91.2 FL — SIGNIFICANT CHANGE UP (ref 81–99)
PHOSPHATE SERPL-MCNC: 2.3 MG/DL — LOW (ref 2.4–4.7)
PLATELET # BLD AUTO: 73 K/UL — LOW (ref 150–400)
POTASSIUM SERPL-MCNC: 4.1 MMOL/L — SIGNIFICANT CHANGE UP (ref 3.5–5.3)
POTASSIUM SERPL-SCNC: 4.1 MMOL/L — SIGNIFICANT CHANGE UP (ref 3.5–5.3)
RBC # BLD: 2.97 M/UL — LOW (ref 4.4–5.2)
RBC # FLD: 18.5 % — HIGH (ref 11–15.6)
SODIUM SERPL-SCNC: 145 MMOL/L — SIGNIFICANT CHANGE UP (ref 135–145)
WBC # BLD: 1.3 K/UL — LOW (ref 4.8–10.8)
WBC # FLD AUTO: 1.3 K/UL — LOW (ref 4.8–10.8)

## 2017-11-16 PROCEDURE — 86901 BLOOD TYPING SEROLOGIC RH(D): CPT

## 2017-11-16 PROCEDURE — 99239 HOSP IP/OBS DSCHRG MGMT >30: CPT

## 2017-11-16 PROCEDURE — 82607 VITAMIN B-12: CPT

## 2017-11-16 PROCEDURE — P9016: CPT

## 2017-11-16 PROCEDURE — 86920 COMPATIBILITY TEST SPIN: CPT

## 2017-11-16 PROCEDURE — 83605 ASSAY OF LACTIC ACID: CPT

## 2017-11-16 PROCEDURE — 85027 COMPLETE CBC AUTOMATED: CPT

## 2017-11-16 PROCEDURE — 97110 THERAPEUTIC EXERCISES: CPT

## 2017-11-16 PROCEDURE — 97163 PT EVAL HIGH COMPLEX 45 MIN: CPT

## 2017-11-16 PROCEDURE — 76775 US EXAM ABDO BACK WALL LIM: CPT

## 2017-11-16 PROCEDURE — 71045 X-RAY EXAM CHEST 1 VIEW: CPT

## 2017-11-16 PROCEDURE — 83735 ASSAY OF MAGNESIUM: CPT

## 2017-11-16 PROCEDURE — 36430 TRANSFUSION BLD/BLD COMPNT: CPT

## 2017-11-16 PROCEDURE — 97116 GAIT TRAINING THERAPY: CPT

## 2017-11-16 PROCEDURE — 87086 URINE CULTURE/COLONY COUNT: CPT

## 2017-11-16 PROCEDURE — 84100 ASSAY OF PHOSPHORUS: CPT

## 2017-11-16 PROCEDURE — 83550 IRON BINDING TEST: CPT

## 2017-11-16 PROCEDURE — 84132 ASSAY OF SERUM POTASSIUM: CPT

## 2017-11-16 PROCEDURE — P9037: CPT

## 2017-11-16 PROCEDURE — 87040 BLOOD CULTURE FOR BACTERIA: CPT

## 2017-11-16 PROCEDURE — 36415 COLL VENOUS BLD VENIPUNCTURE: CPT

## 2017-11-16 PROCEDURE — 99285 EMERGENCY DEPT VISIT HI MDM: CPT | Mod: 25

## 2017-11-16 PROCEDURE — 93005 ELECTROCARDIOGRAM TRACING: CPT

## 2017-11-16 PROCEDURE — 84466 ASSAY OF TRANSFERRIN: CPT

## 2017-11-16 PROCEDURE — 82746 ASSAY OF FOLIC ACID SERUM: CPT

## 2017-11-16 PROCEDURE — 86850 RBC ANTIBODY SCREEN: CPT

## 2017-11-16 PROCEDURE — 80048 BASIC METABOLIC PNL TOTAL CA: CPT

## 2017-11-16 PROCEDURE — 81001 URINALYSIS AUTO W/SCOPE: CPT

## 2017-11-16 PROCEDURE — 84443 ASSAY THYROID STIM HORMONE: CPT

## 2017-11-16 PROCEDURE — 82728 ASSAY OF FERRITIN: CPT

## 2017-11-16 PROCEDURE — 86900 BLOOD TYPING SEROLOGIC ABO: CPT

## 2017-11-16 PROCEDURE — 80053 COMPREHEN METABOLIC PANEL: CPT

## 2017-11-16 RX ORDER — AMLODIPINE BESYLATE 2.5 MG/1
2.5 TABLET ORAL DAILY
Qty: 0 | Refills: 0 | Status: DISCONTINUED | OUTPATIENT
Start: 2017-11-16 | End: 2017-11-16

## 2017-11-16 RX ORDER — ATENOLOL 25 MG/1
1 TABLET ORAL
Qty: 0 | Refills: 0 | COMMUNITY
Start: 2017-11-16

## 2017-11-16 RX ORDER — OXYBUTYNIN CHLORIDE 5 MG
1 TABLET ORAL
Qty: 0 | Refills: 0 | COMMUNITY

## 2017-11-16 RX ORDER — AMLODIPINE BESYLATE 2.5 MG/1
1 TABLET ORAL
Qty: 30 | Refills: 0 | OUTPATIENT
Start: 2017-11-16 | End: 2017-12-16

## 2017-11-16 RX ORDER — LISINOPRIL 2.5 MG/1
1 TABLET ORAL
Qty: 30 | Refills: 0 | OUTPATIENT
Start: 2017-11-16 | End: 2017-12-16

## 2017-11-16 RX ORDER — LISINOPRIL 2.5 MG/1
1 TABLET ORAL
Qty: 0 | Refills: 0 | COMMUNITY

## 2017-11-16 RX ORDER — FOLIC ACID 0.8 MG
1 TABLET ORAL
Qty: 0 | Refills: 0 | COMMUNITY
Start: 2017-11-16

## 2017-11-16 RX ORDER — ATENOLOL 25 MG/1
1 TABLET ORAL
Qty: 0 | Refills: 0 | COMMUNITY

## 2017-11-16 RX ORDER — CLOPIDOGREL BISULFATE 75 MG/1
1 TABLET, FILM COATED ORAL
Qty: 0 | Refills: 0 | COMMUNITY

## 2017-11-16 RX ORDER — FOLIC ACID 0.8 MG
1 TABLET ORAL
Qty: 0 | Refills: 0 | COMMUNITY

## 2017-11-16 RX ADMIN — AMLODIPINE BESYLATE 2.5 MILLIGRAM(S): 2.5 TABLET ORAL at 11:32

## 2017-11-16 RX ADMIN — ATENOLOL 50 MILLIGRAM(S): 25 TABLET ORAL at 05:26

## 2017-11-16 RX ADMIN — PANTOPRAZOLE SODIUM 40 MILLIGRAM(S): 20 TABLET, DELAYED RELEASE ORAL at 05:26

## 2017-11-16 RX ADMIN — LISINOPRIL 40 MILLIGRAM(S): 2.5 TABLET ORAL at 05:26

## 2017-11-16 RX ADMIN — FLUCONAZOLE 100 MILLIGRAM(S): 150 TABLET ORAL at 11:32

## 2017-11-16 RX ADMIN — Medication 1 MILLIGRAM(S): at 11:32

## 2017-11-16 NOTE — PROGRESS NOTE ADULT - SUBJECTIVE AND OBJECTIVE BOX
DOMENIC CEBALLOS    [id number]    62y      Female    INTERVAL HPI/OVERNIGHT EVENTS: No events on. Pt doing well today.    Hospital course:  61 yo F with h/o HTN, CAD s/p PCI (2008), metastatic lung cA with brain mets s/p radiation therapy (last 8/17) and chemotherapy (last session last week) presents with worsening fatigue x 2 weeks. She reports dyspnea on exertion, and is unable to walk more than a few feet without becoming severely dyspneic. Has been having loss of appetite. In the ED, hgb 7.9 and received 1u PRBC. Repeat hgb was 7.9, and pt received additional 1u PRBC with improvement hgb to 10.1. MERCEDES improved with gentle hydration. Renal sono negative for obstruction. Noted to have asymptomatic hypertension during course, requiring hydralazine IVP. Platelets trended down to 40 and pt developed epistaxis. Pt was transfused 2u platelets. However, platelets continued to trend down.     REVIEW OF SYSTEMS:    CONSTITUTIONAL: No fever   RESPIRATORY: No cough   CARDIOVASCULAR: No chest pain     Vital Signs Last 24 Hrs  T(C): 36.6 (16 Nov 2017 08:00), Max: 36.9 (15 Nov 2017 16:40)  T(F): 97.9 (16 Nov 2017 08:00), Max: 98.5 (15 Nov 2017 16:40)  HR: 88 (16 Nov 2017 08:00) (62 - 88)  BP: 180/93 (16 Nov 2017 08:00) (140/78 - 181/88)  BP(mean): --  RR: 19 (16 Nov 2017 08:00) (18 - 19)  SpO2: 96% (16 Nov 2017 08:00) (96% - 98%)    PHYSICAL EXAM:    GENERAL: NAD   HEENT: PERRL, +EOMI, MMM  CHEST/LUNG: Clear to percussion bilaterally   HEART: S1S2+   ABDOMEN: Soft, Nontender, Nondistended; Bowel sounds present         LABS:                        9.1    1.3   )-----------( 73       ( 16 Nov 2017 08:10 )             27.1     11-16    145  |  108<H>  |  7.0<L>  ----------------------------<  91  4.1   |  24.0  |  1.31<H>    Ca    9.5      16 Nov 2017 08:12  Phos  2.3     11-16  Mg     1.7     11-16              MEDICATIONS  (STANDING):  amLODIPine   Tablet 2.5 milliGRAM(s) Oral daily  ATENolol  Tablet 50 milliGRAM(s) Oral two times a day  atorvastatin 80 milliGRAM(s) Oral at bedtime  fluconAZOLE   Tablet 100 milliGRAM(s) Oral daily  folic acid 1 milliGRAM(s) Oral daily  lisinopril 40 milliGRAM(s) Oral daily  pantoprazole    Tablet 40 milliGRAM(s) Oral before breakfast    MEDICATIONS  (PRN):  ondansetron Injectable 4 milliGRAM(s) IV Push every 6 hours PRN Nausea and/or Vomiting      RADIOLOGY & ADDITIONAL TESTS:
DOMENIC CEBALLOS    [id number]    62y      Female    INTERVAL HPI/OVERNIGHT EVENTS: Was hypertensive overnight, improved with hydralazine IVP. Continues to feel fatigued. Hgb stabilizing s/p 2u PRBC.     Hospital course:  63 yo F with h/o HTN, CAD s/p PCI (), metastatic lung cA with brain mets s/p radiation therapy (last ) and chemotherapy (last session last week) presents with worsening fatigue x 2 weeks. She reports dyspnea on exertion, and is unable to walke more than a few feet without becoming severely dyspneic. Has been having loss of appetite. In the ED, hgb 7.9 and received 1u PRBC. Repeat hgb was 7.9, and pt received additional 1u PRBC with improvement hgb to 10.1. MERCEDES improved with gentle hydration. Renal sono negative for obstruction. Noted to have asymptomatic hypertension during course, requiring hydralazine IVP.     REVIEW OF SYSTEMS:    CONSTITUTIONAL: No fever  CARDIOVASCULAR: No chest pain     Vital Signs Last 24 Hrs  T(C): 36.9 (2017 07:26), Max: 36.9 (2017 11:24)  T(F): 98.4 (2017 07:26), Max: 98.4 (2017 11:24)  HR: 70 (2017 10:50) (49 - 70)  BP: 102/56 (2017 10:50) (102/56 - 198/91)  BP(mean): --  RR: 18 (2017 10:50) (18 - 18)  SpO2: 98% (2017 10:50) (98% - 100%) RA    PHYSICAL EXAM:    GENERAL: NAD, well-groomed  HEENT: PERRL, +EOMI, +left eye subconjunctival hemorrhage  CHEST/LUNG: Clear to percussion bilaterally; No wheezing  HEART: S1S2+, Regular rate and rhythm   ABDOMEN: Soft, Nontender, Nondistended; Bowel sounds present       LABS:                        10.1   2.4   )-----------( 40       ( 2017 09:09 )             29.0     11-12    149<H>  |  114<H>  |  14.0  ----------------------------<  82  4.3   |  19.0<L>  |  1.36<H>    Ca    9.2      2017 09:09  Phos  1.7     11-12  Mg     1.7     11-12    TPro  6.8  /  Alb  3.8  /  TBili  0.6  /  DBili  x   /  AST  24  /  ALT  9   /  AlkPhos  76  11-10      Urinalysis Basic - ( 2017 10:53 )    Color: Other / Appearance: Clear / S.005 / pH: x  Gluc: x / Ketone: Negative  / Bili: Negative / Urobili: Negative mg/dL   Blood: x / Protein: 100 mg/dL / Nitrite: Negative   Leuk Esterase: Negative / RBC: 3-5 /HPF / WBC 0-2   Sq Epi: x / Non Sq Epi: Few / Bacteria: Few          MEDICATIONS  (STANDING):  aspirin  chewable 81 milliGRAM(s) Oral daily  ATENolol  Tablet 50 milliGRAM(s) Oral two times a day  atorvastatin 80 milliGRAM(s) Oral at bedtime  clopidogrel Tablet 75 milliGRAM(s) Oral daily  fluconAZOLE   Tablet 100 milliGRAM(s) Oral daily  folic acid 1 milliGRAM(s) Oral daily  lisinopril 40 milliGRAM(s) Oral daily  pantoprazole    Tablet 40 milliGRAM(s) Oral before breakfast  potassium acid phosphate/sodium acid phosphate tablet (K-PHOS No. 2) 1 Tablet(s) Oral once  sodium chloride 0.45%. 1000 milliLiter(s) (100 mL/Hr) IV Continuous <Continuous>    MEDICATIONS  (PRN):  ondansetron Injectable 4 milliGRAM(s) IV Push every 6 hours PRN Nausea and/or Vomiting      RADIOLOGY & ADDITIONAL TESTS:
DOMENIC CEBALLOS    [id number]    62y      Female    INTERVAL HPI/OVERNIGHT EVENTS: Had short episode of epistaxis overnight. States that it is now intermittent.    Hospital course:  63 yo F with h/o HTN, CAD s/p PCI (2008), metastatic lung cA with brain mets s/p radiation therapy (last 8/17) and chemotherapy (last session last week) presents with worsening fatigue x 2 weeks. She reports dyspnea on exertion, and is unable to walke more than a few feet without becoming severely dyspneic. Has been having loss of appetite. In the ED, hgb 7.9 and received 1u PRBC. Repeat hgb was 7.9, and pt received additional 1u PRBC with improvement hgb to 10.1. MERCEDES improved with gentle hydration. Renal sono negative for obstruction. Noted to have asymptomatic hypertension during course, requiring hydralazine IVP.     REVIEW OF SYSTEMS:    RESPIRATORY: No cough   CARDIOVASCULAR: No chest pain     Vital Signs Last 24 Hrs  T(C): 36.8 (12 Nov 2017 23:50), Max: 37.1 (12 Nov 2017 15:21)  T(F): 98.2 (12 Nov 2017 23:50), Max: 98.7 (12 Nov 2017 15:21)  HR: 57 (13 Nov 2017 07:40) (57 - 74)  BP: 160/86 (13 Nov 2017 07:40) (139/78 - 178/88)  BP(mean): --  RR: 20 (13 Nov 2017 07:40) (18 - 20)  SpO2: 96% (12 Nov 2017 23:50) (96% - 98%)    PHYSICAL EXAM:    GENERAL: NAD, frail  HEENT: PERRL, +EOMI, +dried blood on nares  CHEST/LUNG: Clear to percussion bilaterally   HEART: S1S2+, Regular rate and rhythm   ABDOMEN: Soft, Nontender, Nondistended; Bowel sounds present     LABS:                        8.8    1.7   )-----------( 25       ( 13 Nov 2017 08:05 )             26.9     11-13    145  |  110<H>  |  11.0  ----------------------------<  80  3.5   |  21.0<L>  |  1.27    Ca    8.8      13 Nov 2017 08:05  Phos  2.4     11-13  Mg     1.4     11-13              MEDICATIONS  (STANDING):  ATENolol  Tablet 50 milliGRAM(s) Oral two times a day  atorvastatin 80 milliGRAM(s) Oral at bedtime  fluconAZOLE   Tablet 100 milliGRAM(s) Oral daily  folic acid 1 milliGRAM(s) Oral daily  lisinopril 40 milliGRAM(s) Oral daily  magnesium oxide 400 milliGRAM(s) Oral three times a day with meals  pantoprazole    Tablet 40 milliGRAM(s) Oral before breakfast    MEDICATIONS  (PRN):  ondansetron Injectable 4 milliGRAM(s) IV Push every 6 hours PRN Nausea and/or Vomiting      RADIOLOGY & ADDITIONAL TESTS:
DOMENIC CEBALLOS    [id number]    62y      Female    INTERVAL HPI/OVERNIGHT EVENTS: No events on. Epistaxis resolved s/p 2u platelets. Offers no complaints.    Hospital course:  61 yo F with h/o HTN, CAD s/p PCI (2008), metastatic lung cA with brain mets s/p radiation therapy (last 8/17) and chemotherapy (last session last week) presents with worsening fatigue x 2 weeks. She reports dyspnea on exertion, and is unable to walke more than a few feet without becoming severely dyspneic. Has been having loss of appetite. In the ED, hgb 7.9 and received 1u PRBC. Repeat hgb was 7.9, and pt received additional 1u PRBC with improvement hgb to 10.1. MERCEEDS improved with gentle hydration. Renal sono negative for obstruction. Noted to have asymptomatic hypertension during course, requiring hydralazine IVP.     REVIEW OF SYSTEMS:    RESPIRATORY: No cough   CARDIOVASCULAR: No chest pain     Vital Signs Last 24 Hrs  T(C): 37.1 (14 Nov 2017 07:10), Max: 37.1 (14 Nov 2017 07:10)  T(F): 98.7 (14 Nov 2017 07:10), Max: 98.7 (14 Nov 2017 07:10)  HR: 65 (14 Nov 2017 07:10) (63 - 92)  BP: 120/70 (14 Nov 2017 07:10) (120/70 - 185/83)  BP(mean): --  RR: 19 (14 Nov 2017 07:10) (18 - 19)  SpO2: 99% (14 Nov 2017 07:10) (98% - 99%)    PHYSICAL EXAM:    GENERAL: NAD, frail   HEENT: PERRL, +EOMI, MMM  CHEST/LUNG: Clear to percussion bilaterally   HEART: S1S2+, Regular rate and rhythm   ABDOMEN: Soft, Nontender, Nondistended; Bowel sounds present  EXTREMITIES:  wwp    LABS:                        8.5    1.2   )-----------( 25       ( 14 Nov 2017 08:20 )             24.5     11-14    143  |  105  |  11.0  ----------------------------<  87  3.5   |  23.0  |  1.35<H>    Ca    9.0      14 Nov 2017 08:20  Phos  2.6     11-14  Mg     1.3     11-14              MEDICATIONS  (STANDING):  ATENolol  Tablet 50 milliGRAM(s) Oral two times a day  atorvastatin 80 milliGRAM(s) Oral at bedtime  fluconAZOLE   Tablet 100 milliGRAM(s) Oral daily  folic acid 1 milliGRAM(s) Oral daily  lisinopril 40 milliGRAM(s) Oral daily  magnesium sulfate  IVPB 2 Gram(s) IV Intermittent once  pantoprazole    Tablet 40 milliGRAM(s) Oral before breakfast    MEDICATIONS  (PRN):  ondansetron Injectable 4 milliGRAM(s) IV Push every 6 hours PRN Nausea and/or Vomiting      RADIOLOGY & ADDITIONAL TESTS:
DOMENIC CEBALLOS    [id number]    62y      Female    INTERVAL HPI/OVERNIGHT EVENTS: No episodes of bleeding. Platelets continuing to trend down. Reports feeling tired.    Hospital course:  61 yo F with h/o HTN, CAD s/p PCI (2008), metastatic lung cA with brain mets s/p radiation therapy (last 8/17) and chemotherapy (last session last week) presents with worsening fatigue x 2 weeks. She reports dyspnea on exertion, and is unable to walk more than a few feet without becoming severely dyspneic. Has been having loss of appetite. In the ED, hgb 7.9 and received 1u PRBC. Repeat hgb was 7.9, and pt received additional 1u PRBC with improvement hgb to 10.1. MERCEDES improved with gentle hydration. Renal sono negative for obstruction. Noted to have asymptomatic hypertension during course, requiring hydralazine IVP. Platelets trended down to 40 and pt developed epistaxis. Pt was transfused 2u platelets. However, platelets continued to trend down.       REVIEW OF SYSTEMS:    CONSTITUTIONAL: No fever, weight loss, or fatigue  RESPIRATORY: No cough, wheezing, hemoptysis; No shortness of breath  CARDIOVASCULAR: No chest pain     Vital Signs Last 24 Hrs  T(C): 37.1 (15 Nov 2017 09:37), Max: 37.1 (15 Nov 2017 09:37)  T(F): 98.7 (15 Nov 2017 09:37), Max: 98.7 (15 Nov 2017 09:37)  HR: 58 (15 Nov 2017 09:37) (56 - 62)  BP: 156/84 (15 Nov 2017 09:37) (148/80 - 162/92)  BP(mean): --  RR: 18 (15 Nov 2017 09:37) (18 - 18)  SpO2: 97% (15 Nov 2017 09:37) (97% - 99%)    PHYSICAL EXAM:    GENERAL: NAD, well-groomed  HEENT: PERRL, +EOMI  CHEST/LUNG: Clear to percussion bilaterally   HEART: S1S2+, Regular rate and rhythm; No murmurs, rubs, or gallops  ABDOMEN: Soft, Nontender, Nondistended; Bowel sounds present        LABS:                        8.8    1.4   )-----------( 11       ( 15 Nov 2017 08:30 )             25.6     11-15    142  |  104  |  10.0  ----------------------------<  88  3.4<L>   |  25.0  |  1.40<H>    Ca    9.3      15 Nov 2017 08:30  Phos  3.0     11-15  Mg     1.9     11-15              MEDICATIONS  (STANDING):  ATENolol  Tablet 50 milliGRAM(s) Oral two times a day  atorvastatin 80 milliGRAM(s) Oral at bedtime  fluconAZOLE   Tablet 100 milliGRAM(s) Oral daily  folic acid 1 milliGRAM(s) Oral daily  lisinopril 40 milliGRAM(s) Oral daily  pantoprazole    Tablet 40 milliGRAM(s) Oral before breakfast  potassium chloride    Tablet ER 40 milliEquivalent(s) Oral every 4 hours    MEDICATIONS  (PRN):  ondansetron Injectable 4 milliGRAM(s) IV Push every 6 hours PRN Nausea and/or Vomiting      RADIOLOGY & ADDITIONAL TESTS:
DOMENIC CEBALLOS    [id number]    62y      Female    INTERVAL HPI/OVERNIGHT EVENTS: S/p 1u PRBC, Hgb 7.9. States that she continues to feel exhausted.    Hospital course:  63 yo F with h/o HTN, CAD s/p PCI (), metastatic lung cA with brain mets s/p radiation therapy (last ) and chemotherapy (last session last week) presents with worsening fatigue x 2 weeks. She reports dyspnea on exertion, and is unable to walke more than a few feet without becoming severely dyspneic. Has been having loss of appetite. In the ED, hgb 7.9 and received 1u PRBC.     REVIEW OF SYSTEMS:    CONSTITUTIONAL: No fevers or chills  GASTROINTESTINAL: No abdominal or epigastric pain. No nausea, vomiting     Vital Signs Last 24 Hrs  T(C): 36.9 (2017 07:54), Max: 37.2 (10 Nov 2017 23:43)  T(F): 98.4 (2017 07:54), Max: 98.9 (10 Nov 2017 23:43)  HR: 66 (2017 11:00) (61 - 84)  BP: 165/76 (2017 11:00) (98/65 - 170/89)  BP(mean): --  RR: 18 (2017 11:00) (16 - 18)  SpO2: 98% (2017 11:00) (96% - 100%)    PHYSICAL EXAM:    GENERAL: NAD, frail, chronically ill appearing, well groomed  HEENT: MMM  CHEST/LUNG: Clear to percussion bilaterally; No wheezing  HEART: S1S2+, Regular rate and rhythm   ABDOMEN: Soft, Nontender, Nondistended; Bowel sounds present       LABS:                        7.9    2.6   )-----------( 50       ( 2017 09:33 )             22.8         146<H>  |  109<H>  |  17.0  ----------------------------<  94  3.0<L>   |  20.0<L>  |  1.50<H>    Ca    8.7      2017 08:34  Phos  2.6       Mg     1.8         TPro  6.8  /  Alb  3.8  /  TBili  0.6  /  DBili  x   /  AST  24  /  ALT  9   /  AlkPhos  76  11-10      Urinalysis Basic - ( 2017 10:53 )    Color: Other / Appearance: Clear / S.005 / pH: x  Gluc: x / Ketone: Negative  / Bili: Negative / Urobili: Negative mg/dL   Blood: x / Protein: 100 mg/dL / Nitrite: Negative   Leuk Esterase: Negative / RBC: x / WBC x   Sq Epi: x / Non Sq Epi: x / Bacteria: x          MEDICATIONS  (STANDING):  aspirin  chewable 81 milliGRAM(s) Oral daily  ATENolol  Tablet 50 milliGRAM(s) Oral two times a day  atorvastatin 80 milliGRAM(s) Oral at bedtime  clopidogrel Tablet 75 milliGRAM(s) Oral daily  fluconAZOLE   Tablet 200 milliGRAM(s) Oral once  folic acid 1 milliGRAM(s) Oral daily  lisinopril 20 milliGRAM(s) Oral once  pantoprazole    Tablet 40 milliGRAM(s) Oral before breakfast  potassium chloride    Tablet ER 40 milliEquivalent(s) Oral every 4 hours  potassium chloride  10 mEq/100 mL IVPB 10 milliEquivalent(s) IV Intermittent every 1 hour    MEDICATIONS  (PRN):  ondansetron Injectable 4 milliGRAM(s) IV Push every 6 hours PRN Nausea and/or Vomiting      RADIOLOGY & ADDITIONAL TESTS:

## 2017-11-16 NOTE — DISCHARGE NOTE ADULT - PROVIDER TOKENS
FREE:[LAST:[Kennedy],FIRST:[Adrian],PHONE:[phone]],FAX:[(   [phone]    -],ADDRESS:[[address] [place] [id number]]]

## 2017-11-16 NOTE — PROGRESS NOTE ADULT - PROBLEM SELECTOR PLAN 3
Was using nystatin as outpt  Given leukopenia, will start fluconazole
C/w fluconazole
Metastatic  Being followed by Dr. Luna at [facility] in [place]    On chemo and radiation therapy
C/w fluconazole
Metastatic  Being followed by Dr. Luna at [facility] in [place]  On chemo and radiation therapy
Metastatic  Being followed by Dr. Luna at [facility] in [place] who is on maternity leave. Have not received call back from covering physician.   On chemo and radiation therapy  Pt would like second opinion.

## 2017-11-16 NOTE — PROGRESS NOTE ADULT - PROBLEM SELECTOR PLAN 1
Hgb stabilizing 10.1 today. Trend CBC  Iron studies performed s/p transfusion do not accurately reflect her prior levels  Denies any symptoms - no hematochezia, hemoptysis, hematuria. FOBT pending  Platelets 40 - monitor for now as now signs of bleeding
Platelets 73 today. S/p 4u platelets during this admission.   Hgb 8.5 today. Total 2u PRBC.  Iron studies performed s/p transfusion do not accurately reflect her prior levels  Discussed with Dr. Kennedy from [facility], she states pt is safe for discharge and is to follow up with her tomorrow 11/17 for repeat bloodwork.
Hgb 8.8 today. Likely hemodilutional as pt received 1/2 NS IVF yesterday. Monitor Hgb  Iron studies performed s/p transfusion do not accurately reflect her prior levels  Denies any symptoms - no hematochezia, hemoptysis, hematuria. FOBT pending  Platelets 25 today - given epistaxis, will transfuse 2u platelets
Platelets 11 today. Transfuse additional 2u for total of 4u platelets during this admission.   Hgb 8.5 today. Total 2u PRBC.  Iron studies performed s/p transfusion do not accurately reflect her prior levels  Heme consult pending.
Platelets 25 today s/p 2u platlets. Trend CBC today and transfuse if <10k  Hgb 8.5 today. Total 2u PRBC.  Iron studies performed s/p transfusion do not accurately reflect her prior levels  Denies any symptoms - no hematochezia, hemoptysis, hematuria. FOBT pending
Hgb did not respond appropriately to 1u PRBC.  Iron studies performed s/p transfusion do not accurately reflect her prior levels  Denies any symptoms - no hematochezia, hemoptysis, hematuria  Transfuse 1u PRBC.   Repeat CBC in PM  Platelets 50 - monitor for now as now signs of bleeding

## 2017-11-16 NOTE — PROGRESS NOTE ADULT - ASSESSMENT
61 yo F with h/o HTN, CAD s/p PCI (2008), metastatic lung cA with brain mets s/p radiation therapy (last 8/17) and chemotherapy (last session last week) here with symptomatic anemia, thrush, and failure to thrive.
63 yo F with h/o HTN, CAD s/p PCI (2008), metastatic lung cA with brain mets s/p radiation therapy (last 8/17) and chemotherapy (last session last week) here with symptomatic anemia, thrush, and failure to thrive.
61 yo F with h/o HTN, CAD s/p PCI (2008), metastatic lung cA with brain mets s/p radiation therapy (last 8/17) and chemotherapy (last session last week) here with symptomatic anemia, thrush, and failure to thrive.
61 yo F with h/o HTN, CAD s/p PCI (2008), metastatic lung cA with brain mets s/p radiation therapy (last 8/17) and chemotherapy (last session last week) here with symptomatic anemia, thrush, and failure to thrive.
63 yo F with h/o HTN, CAD s/p PCI (2008), metastatic lung cA with brain mets s/p radiation therapy (last 8/17) and chemotherapy (last session last week) here with symptomatic anemia, thrush, and failure to thrive.
61 yo F with h/o HTN, CAD s/p PCI (2008), metastatic lung cA with brain mets s/p radiation therapy (last 8/17) and chemotherapy (last session last week) here with symptomatic anemia, thrush, and failure to thrive.

## 2017-11-16 NOTE — DISCHARGE NOTE ADULT - SECONDARY DIAGNOSIS.
Essential hypertension Malignant neoplasm of lung, unspecified laterality, unspecified part of lung Oral thrush Acute kidney injury Severe protein-calorie malnutrition

## 2017-11-16 NOTE — DISCHARGE NOTE ADULT - CARE PROVIDER_API CALL
Adrian Kennedy  [address] [place] [address], [place] [address]  Phone: [phone]  Fax: (   )    -

## 2017-11-16 NOTE — PROGRESS NOTE ADULT - PROBLEM SELECTOR PLAN 4
Metastatic  Being followed by Dr. Luna at [facility] in [place]  On chemo and radiation therapy
Metastatic  Being followed by Dr. Luna at [facility] in [place]  On chemo and radiation therapy
No prior MERCEDES  Resolved  Renal US negative for obstruction
Metastatic  Being followed by Dr. Luna at [facility] in [place]  On chemo and radiation therapy
No prior MERCEDES  Resolved  Renal US negative for obstruction
No prior MERCEDES  Resolved  Renal US negative for obstruction

## 2017-11-16 NOTE — PROGRESS NOTE ADULT - PROBLEM SELECTOR PROBLEM 4
Malignant neoplasm of lung, unspecified laterality, unspecified part of lung
Acute kidney injury
Malignant neoplasm of lung, unspecified laterality, unspecified part of lung
Acute kidney injury
Acute kidney injury
Malignant neoplasm of lung, unspecified laterality, unspecified part of lung

## 2017-11-16 NOTE — PROGRESS NOTE ADULT - PROBLEM SELECTOR PLAN 5
No prior MERCEDES  Cr. 1.50  Urine studies pending  Renal US pending
SCDs given thrombocytopenia
No prior MERCEDES  Cr. 1.36  Renal US negative for obstruction
No prior MERCEDES  Resolved  Renal US negative for obstruction
SCDs given thrombocytopenia
SCDs given thrombocytopenia

## 2017-11-16 NOTE — DISCHARGE NOTE ADULT - CARE PLAN
Principal Discharge DX:	Pancytopenia due to chemotherapy  Goal:	Therapeutic Optimization  Instructions for follow-up, activity and diet:	Follow up with your oncologist tomorrow Nov. 17, 2017 for repeat CBC. Continue with folic acid.  Secondary Diagnosis:	Essential hypertension  Instructions for follow-up, activity and diet:	Continue with amlodipine, atenolol, and lisinopril.  Secondary Diagnosis:	Malignant neoplasm of lung, unspecified laterality, unspecified part of lung  Instructions for follow-up, activity and diet:	Follow up with your oncologist. Continue with Ensure daily for supplementation.  Secondary Diagnosis:	Oral thrush  Instructions for follow-up, activity and diet:	Continue with fluconazole for 2 days.  Secondary Diagnosis:	Acute kidney injury  Instructions for follow-up, activity and diet:	Continue with oral hydration - approximately 1 liter of water daily. Principal Discharge DX:	Pancytopenia due to chemotherapy  Goal:	Therapeutic Optimization  Instructions for follow-up, activity and diet:	Follow up with your oncologist tomorrow Nov. 17, 2017 for repeat CBC. Continue with folic acid.  Secondary Diagnosis:	Essential hypertension  Instructions for follow-up, activity and diet:	Continue with amlodipine, atenolol, and lisinopril.  Secondary Diagnosis:	Malignant neoplasm of lung, unspecified laterality, unspecified part of lung  Instructions for follow-up, activity and diet:	Follow up with your oncologist. Continue with Ensure daily for supplementation.  Secondary Diagnosis:	Oral thrush  Instructions for follow-up, activity and diet:	Continue with fluconazole for 2 days.  Secondary Diagnosis:	Acute kidney injury  Instructions for follow-up, activity and diet:	Continue with oral hydration - approximately 1 liter of water daily.  Secondary Diagnosis:	Severe protein-calorie malnutrition  Instructions for follow-up, activity and diet:	Continue with ensure.

## 2017-11-16 NOTE — DISCHARGE NOTE ADULT - PLAN OF CARE
Therapeutic Optimization Follow up with your oncologist tomorrow Nov. 17, 2017 for repeat CBC. Continue with folic acid. Continue with amlodipine, atenolol, and lisinopril. Follow up with your oncologist. Continue with Ensure daily for supplementation. Continue with fluconazole for 2 days. Continue with oral hydration - approximately 1 liter of water daily. Continue with ensure.

## 2017-11-16 NOTE — PROGRESS NOTE ADULT - PROBLEM SELECTOR PROBLEM 5
Acute kidney injury
Acute kidney injury
Prophylactic measure
Acute kidney injury
Prophylactic measure
Prophylactic measure

## 2017-11-16 NOTE — PROGRESS NOTE ADULT - PROBLEM SELECTOR PROBLEM 1
Pancytopenia due to chemotherapy

## 2017-11-16 NOTE — DISCHARGE NOTE ADULT - MEDICATION SUMMARY - MEDICATIONS TO TAKE
I will START or STAY ON the medications listed below when I get home from the hospital:    aspirin 81 mg oral tablet  -- 1 tab(s) by mouth once a day  -- Indication: For CAD    lisinopril 40 mg oral tablet  -- 1 tab(s) by mouth once a day  -- Indication: For HTN    fluconazole 100 mg oral tablet  -- 1 tab(s) by mouth once a day  -- Indication: For Thrush    Crestor 40 mg oral tablet  -- 1 tab(s) by mouth once a day (at bedtime)  -- Indication: For HLD    atenolol 50 mg oral tablet  -- 1 tab(s) by mouth 2 times a day  -- Indication: For Essential hypertension    amLODIPine 2.5 mg oral tablet  -- 1 tab(s) by mouth once a day  -- Indication: For Essential hypertension    pantoprazole 40 mg oral delayed release tablet  -- 1 tab(s) by mouth once a day  -- Indication: For GERD    folic acid 1 mg oral tablet  -- 1 tab(s) by mouth once a day  -- Indication: For Anemia

## 2017-11-16 NOTE — PROGRESS NOTE ADULT - PROBLEM SELECTOR PROBLEM 3
Malignant neoplasm of lung, unspecified laterality, unspecified part of lung
Oral thrush
Malignant neoplasm of lung, unspecified laterality, unspecified part of lung
Malignant neoplasm of lung, unspecified laterality, unspecified part of lung
Oral thrush
Oral thrush

## 2017-11-16 NOTE — DISCHARGE NOTE ADULT - HOSPITAL COURSE
61 yo F with h/o HTN, CAD s/p PCI (2008), metastatic lung cA with brain mets s/p radiation therapy (last 8/17) and chemotherapy (last session last week) presents with worsening fatigue x 2 weeks. She reports dyspnea on exertion, and is unable to walk more than a few feet without becoming severely dyspneic. Has been having loss of appetite. In the ED, hgb 7.9 and received 1u PRBC. Repeat hgb was 7.9, and pt received additional 1u PRBC with improvement hgb to 10.1. MERCEDES improved with gentle hydration. Renal sono negative for obstruction. Noted to have asymptomatic hypertension during course, requiring hydralazine IVP. Platelets trended down to 40 and pt developed epistaxis. Pt was transfused 2u platelets. However, platelets continued to trend down to 11. Pt received 2u platelets with improvement to 73. Hgb stable at 9.1. Pt received course of fluconazole for oral thrush. Discussed case with Dr. Adrian Kennedy ([facility] [place]) and states that pt is safe for discharge and can follow up as outpt on Nov. 17.    Time to discharge: >35 minutes spent coordinating care

## 2017-11-16 NOTE — PROGRESS NOTE ADULT - PROBLEM SELECTOR PLAN 2
C/w fluconazole
Resolved  Hypophosphatemia - replete with neutraphos
C/w fluconazole
C/w fluconazole
Resolved  Hypophosphatemia - replete with neutraphos
K = 3  Replete aggressively

## 2017-11-17 RX ORDER — FLUCONAZOLE 150 MG/1
1 TABLET ORAL
Qty: 2 | Refills: 0 | OUTPATIENT
Start: 2017-11-17 | End: 2017-11-19

## 2017-12-11 ENCOUNTER — EMERGENCY (EMERGENCY)
Facility: HOSPITAL | Age: 62
LOS: 1 days | Discharge: HOME | End: 2017-12-11
Attending: EMERGENCY MEDICINE | Admitting: EMERGENCY MEDICINE
Payer: COMMERCIAL

## 2017-12-11 VITALS
WEIGHT: 130.07 LBS | RESPIRATION RATE: 20 BRPM | OXYGEN SATURATION: 100 % | HEIGHT: 64 IN | DIASTOLIC BLOOD PRESSURE: 59 MMHG | HEART RATE: 651 BPM | SYSTOLIC BLOOD PRESSURE: 96 MMHG | TEMPERATURE: 98 F

## 2017-12-11 VITALS
DIASTOLIC BLOOD PRESSURE: 68 MMHG | HEART RATE: 61 BPM | SYSTOLIC BLOOD PRESSURE: 148 MMHG | RESPIRATION RATE: 20 BRPM | OXYGEN SATURATION: 100 %

## 2017-12-11 LAB
ACANTHOCYTES BLD QL SMEAR: SLIGHT — SIGNIFICANT CHANGE UP
ALBUMIN SERPL ELPH-MCNC: 3.7 G/DL — SIGNIFICANT CHANGE UP (ref 3.3–5.2)
ALP SERPL-CCNC: 96 U/L — SIGNIFICANT CHANGE UP (ref 40–120)
ALT FLD-CCNC: 11 U/L — SIGNIFICANT CHANGE UP
ANION GAP SERPL CALC-SCNC: 25 MMOL/L — HIGH (ref 5–17)
ANISOCYTOSIS BLD QL: SLIGHT — SIGNIFICANT CHANGE UP
AST SERPL-CCNC: 27 U/L — SIGNIFICANT CHANGE UP
BASOPHILS # BLD AUTO: 0 K/UL — SIGNIFICANT CHANGE UP (ref 0–0.2)
BASOPHILS NFR BLD AUTO: 0.6 % — SIGNIFICANT CHANGE UP (ref 0–2)
BILIRUB SERPL-MCNC: 0.5 MG/DL — SIGNIFICANT CHANGE UP (ref 0.4–2)
BUN SERPL-MCNC: 19 MG/DL — SIGNIFICANT CHANGE UP (ref 8–20)
CALCIUM SERPL-MCNC: 10.1 MG/DL — SIGNIFICANT CHANGE UP (ref 8.6–10.2)
CHLORIDE SERPL-SCNC: 100 MMOL/L — SIGNIFICANT CHANGE UP (ref 98–107)
CO2 SERPL-SCNC: 17 MMOL/L — LOW (ref 22–29)
CREAT SERPL-MCNC: 1.98 MG/DL — HIGH (ref 0.5–1.3)
ELLIPTOCYTES BLD QL SMEAR: SLIGHT — SIGNIFICANT CHANGE UP
EOSINOPHIL # BLD AUTO: 0 K/UL — SIGNIFICANT CHANGE UP (ref 0–0.5)
EOSINOPHIL NFR BLD AUTO: 0 % — SIGNIFICANT CHANGE UP (ref 0–6)
GLUCOSE SERPL-MCNC: 68 MG/DL — LOW (ref 70–115)
HCT VFR BLD CALC: 26.8 % — LOW (ref 37–47)
HGB BLD-MCNC: 9.1 G/DL — LOW (ref 12–16)
HYPOCHROMIA BLD QL: SLIGHT — SIGNIFICANT CHANGE UP
LYMPHOCYTES # BLD AUTO: 1.8 K/UL — SIGNIFICANT CHANGE UP (ref 1–4.8)
LYMPHOCYTES # BLD AUTO: 26.8 % — SIGNIFICANT CHANGE UP (ref 20–55)
MACROCYTES BLD QL: SLIGHT — SIGNIFICANT CHANGE UP
MCHC RBC-ENTMCNC: 31.6 PG — HIGH (ref 27–31)
MCHC RBC-ENTMCNC: 34 G/DL — SIGNIFICANT CHANGE UP (ref 32–36)
MCV RBC AUTO: 93.1 FL — SIGNIFICANT CHANGE UP (ref 81–99)
MICROCYTES BLD QL: SLIGHT — SIGNIFICANT CHANGE UP
MONOCYTES # BLD AUTO: 0.5 K/UL — SIGNIFICANT CHANGE UP (ref 0–0.8)
MONOCYTES NFR BLD AUTO: 7.4 % — SIGNIFICANT CHANGE UP (ref 3–10)
NEUTROPHILS # BLD AUTO: 4.4 K/UL — SIGNIFICANT CHANGE UP (ref 1.8–8)
NEUTROPHILS NFR BLD AUTO: 65.1 % — SIGNIFICANT CHANGE UP (ref 37–73)
OVALOCYTES BLD QL SMEAR: SLIGHT — SIGNIFICANT CHANGE UP
PLAT MORPH BLD: NORMAL — SIGNIFICANT CHANGE UP
PLATELET # BLD AUTO: 189 K/UL — SIGNIFICANT CHANGE UP (ref 150–400)
POIKILOCYTOSIS BLD QL AUTO: SLIGHT — SIGNIFICANT CHANGE UP
POTASSIUM SERPL-MCNC: 3.8 MMOL/L — SIGNIFICANT CHANGE UP (ref 3.5–5.3)
POTASSIUM SERPL-SCNC: 3.8 MMOL/L — SIGNIFICANT CHANGE UP (ref 3.5–5.3)
PROT SERPL-MCNC: 7.7 G/DL — SIGNIFICANT CHANGE UP (ref 6.6–8.7)
RBC # BLD: 2.88 M/UL — LOW (ref 4.4–5.2)
RBC # FLD: 22.8 % — HIGH (ref 11–15.6)
RBC BLD AUTO: ABNORMAL
SCHISTOCYTES BLD QL AUTO: SLIGHT — SIGNIFICANT CHANGE UP
SODIUM SERPL-SCNC: 142 MMOL/L — SIGNIFICANT CHANGE UP (ref 135–145)
SPHEROCYTES BLD QL SMEAR: SLIGHT — SIGNIFICANT CHANGE UP
WBC # BLD: 6.8 K/UL — SIGNIFICANT CHANGE UP (ref 4.8–10.8)
WBC # FLD AUTO: 6.8 K/UL — SIGNIFICANT CHANGE UP (ref 4.8–10.8)

## 2017-12-11 PROCEDURE — 99284 EMERGENCY DEPT VISIT MOD MDM: CPT

## 2017-12-11 PROCEDURE — 72125 CT NECK SPINE W/O DYE: CPT

## 2017-12-11 PROCEDURE — 72125 CT NECK SPINE W/O DYE: CPT | Mod: 26

## 2017-12-11 PROCEDURE — 99284 EMERGENCY DEPT VISIT MOD MDM: CPT | Mod: 25

## 2017-12-11 PROCEDURE — 70450 CT HEAD/BRAIN W/O DYE: CPT

## 2017-12-11 PROCEDURE — 36415 COLL VENOUS BLD VENIPUNCTURE: CPT

## 2017-12-11 PROCEDURE — 85027 COMPLETE CBC AUTOMATED: CPT

## 2017-12-11 PROCEDURE — 70450 CT HEAD/BRAIN W/O DYE: CPT | Mod: 26

## 2017-12-11 PROCEDURE — 80053 COMPREHEN METABOLIC PANEL: CPT

## 2017-12-11 RX ORDER — SODIUM CHLORIDE 9 MG/ML
1000 INJECTION INTRAMUSCULAR; INTRAVENOUS; SUBCUTANEOUS ONCE
Qty: 0 | Refills: 0 | Status: COMPLETED | OUTPATIENT
Start: 2017-12-11 | End: 2017-12-11

## 2017-12-11 RX ADMIN — SODIUM CHLORIDE 2000 MILLILITER(S): 9 INJECTION INTRAMUSCULAR; INTRAVENOUS; SUBCUTANEOUS at 17:18

## 2017-12-11 NOTE — ED ADULT NURSE REASSESSMENT NOTE - NS ED NURSE REASSESS COMMENT FT1
Assuming care from previous RN, pt AOx4, denies SOB, resp even and unlabored, skin warm and dry, color good, denies pain/n/v, patent 20G IV in left hand, showing NSR on monitor, pt aware of plan of care, will continue to monitor.

## 2017-12-11 NOTE — ED PROVIDER NOTE - PROGRESS NOTE DETAILS
Pt. re-evaluated. BP/vital signs has improved. Pt. also ate and drank while in the ED. Pt. advised to follow up with her oncologist. labs discussed with patient.

## 2017-12-11 NOTE — ED ADULT NURSE NOTE - CHIEF COMPLAINT QUOTE
Patient arrived to ED today with c/o general weakness, and fall at 10am today.  Patient was walking and fell to her side striking the back of her head, pt denies LOC.  Patient after the fall went to [place] for blood to be drawn and fluids.  Patient states she is on Plavix.

## 2017-12-11 NOTE — ED PROVIDER NOTE - OBJECTIVE STATEMENT
Pt. present to ED c/o "no energy" and feeling very weak. Pt. has hx of HTN and lung CA(on Chemo). Last Chemo was November 1st. Pt went to [facility] today for IV  Hydration. They were unable to get IV access and told pt. to go to the hospital to check her "kidneys". Pt. has been admitted to the hospital for dehydration. Pt. denies any vomiting/diarrhea. Pt. states that he fell today at home because she felt so weak. Pt. denies any LOC. No neck pain. Pt. was able to get herself back up.

## 2017-12-11 NOTE — ED ADULT NURSE NOTE - OBJECTIVE STATEMENT
Pt A&OX3, bib by family, pt states she has been feeling very weak lately and she fell this morning.  pt states she hit her posterior head, denies LOC.  Pt is taking plavix, pt was a cancelled trauma alert.  Clear bsb, abd soft nondistended, nontender, moving all ext well.  Pt's skin and mucosal membranes are extremely dry  and tenting.  Will continue to monitor.  Pt had her last chemo treatment in November.

## 2017-12-11 NOTE — ED ADULT TRIAGE NOTE - CHIEF COMPLAINT QUOTE
Patient arrived to ED today with c/o general weakness, and fall at 10am today.  Patient was walking and fell to her side striking the back of her head, pt denies LOC.  Patient after the fall went to [place] for blood to be drawn and fluids.  Patient states she is on Plavix.

## 2017-12-11 NOTE — ED ADULT NURSE REASSESSMENT NOTE - NS ED NURSE REASSESS COMMENT FT1
Pt able to ambulate safely and steadily w/out assistance, d/c with family via wheelchair, denies dizziness/weakness upon standing, IV removed, pt d/c home w/ family.

## 2018-01-29 ENCOUNTER — EMERGENCY (EMERGENCY)
Facility: HOSPITAL | Age: 63
LOS: 1 days | Discharge: HOME | End: 2018-01-29
Attending: EMERGENCY MEDICINE
Payer: COMMERCIAL

## 2018-01-29 VITALS
SYSTOLIC BLOOD PRESSURE: 128 MMHG | RESPIRATION RATE: 16 BRPM | OXYGEN SATURATION: 100 % | DIASTOLIC BLOOD PRESSURE: 83 MMHG | HEART RATE: 65 BPM | HEIGHT: 64 IN | WEIGHT: 121.92 LBS | TEMPERATURE: 98 F

## 2018-01-29 LAB
ALBUMIN SERPL ELPH-MCNC: 3.2 G/DL — LOW (ref 3.3–5.2)
ALP SERPL-CCNC: 104 U/L — SIGNIFICANT CHANGE UP (ref 40–120)
ALT FLD-CCNC: 13 U/L — SIGNIFICANT CHANGE UP
ANION GAP SERPL CALC-SCNC: 20 MMOL/L — HIGH (ref 5–17)
APPEARANCE UR: CLEAR — SIGNIFICANT CHANGE UP
AST SERPL-CCNC: 21 U/L — SIGNIFICANT CHANGE UP
BASOPHILS # BLD AUTO: 0 K/UL — SIGNIFICANT CHANGE UP (ref 0–0.2)
BASOPHILS NFR BLD AUTO: 0.4 % — SIGNIFICANT CHANGE UP (ref 0–2)
BILIRUB SERPL-MCNC: 0.4 MG/DL — SIGNIFICANT CHANGE UP (ref 0.4–2)
BILIRUB UR-MCNC: ABNORMAL
BUN SERPL-MCNC: 17 MG/DL — SIGNIFICANT CHANGE UP (ref 8–20)
CALCIUM SERPL-MCNC: 9.7 MG/DL — SIGNIFICANT CHANGE UP (ref 8.6–10.2)
CHLORIDE SERPL-SCNC: 102 MMOL/L — SIGNIFICANT CHANGE UP (ref 98–107)
CO2 SERPL-SCNC: 21 MMOL/L — LOW (ref 22–29)
COLOR SPEC: ABNORMAL
CREAT SERPL-MCNC: 1.75 MG/DL — HIGH (ref 0.5–1.3)
DIFF PNL FLD: ABNORMAL
EOSINOPHIL # BLD AUTO: 0 K/UL — SIGNIFICANT CHANGE UP (ref 0–0.5)
EOSINOPHIL NFR BLD AUTO: 0.4 % — SIGNIFICANT CHANGE UP (ref 0–6)
GLUCOSE SERPL-MCNC: 64 MG/DL — LOW (ref 70–115)
GLUCOSE UR QL: NEGATIVE MG/DL — SIGNIFICANT CHANGE UP
HCT VFR BLD CALC: 27.2 % — LOW (ref 37–47)
HGB BLD-MCNC: 9 G/DL — LOW (ref 12–16)
KETONES UR-MCNC: ABNORMAL
LEUKOCYTE ESTERASE UR-ACNC: ABNORMAL
LYMPHOCYTES # BLD AUTO: 1.4 K/UL — SIGNIFICANT CHANGE UP (ref 1–4.8)
LYMPHOCYTES # BLD AUTO: 28 % — SIGNIFICANT CHANGE UP (ref 20–55)
MAGNESIUM SERPL-MCNC: 1.7 MG/DL — SIGNIFICANT CHANGE UP (ref 1.6–2.6)
MCHC RBC-ENTMCNC: 33.1 G/DL — SIGNIFICANT CHANGE UP (ref 32–36)
MCHC RBC-ENTMCNC: 33.8 PG — HIGH (ref 27–31)
MCV RBC AUTO: 102.3 FL — HIGH (ref 81–99)
MONOCYTES # BLD AUTO: 0.3 K/UL — SIGNIFICANT CHANGE UP (ref 0–0.8)
MONOCYTES NFR BLD AUTO: 7 % — SIGNIFICANT CHANGE UP (ref 3–10)
NEUTROPHILS # BLD AUTO: 3.1 K/UL — SIGNIFICANT CHANGE UP (ref 1.8–8)
NEUTROPHILS NFR BLD AUTO: 64 % — SIGNIFICANT CHANGE UP (ref 37–73)
NITRITE UR-MCNC: POSITIVE
PH UR: 6.5 — SIGNIFICANT CHANGE UP (ref 5–8)
PHOSPHATE SERPL-MCNC: 2.5 MG/DL — SIGNIFICANT CHANGE UP (ref 2.4–4.7)
PLATELET # BLD AUTO: 161 K/UL — SIGNIFICANT CHANGE UP (ref 150–400)
POTASSIUM SERPL-MCNC: 3 MMOL/L — LOW (ref 3.5–5.3)
POTASSIUM SERPL-SCNC: 3 MMOL/L — LOW (ref 3.5–5.3)
PROT SERPL-MCNC: 6.4 G/DL — LOW (ref 6.6–8.7)
PROT UR-MCNC: 500 MG/DL
RBC # BLD: 2.66 M/UL — LOW (ref 4.4–5.2)
RBC # FLD: 15.2 % — SIGNIFICANT CHANGE UP (ref 11–15.6)
SODIUM SERPL-SCNC: 143 MMOL/L — SIGNIFICANT CHANGE UP (ref 135–145)
SP GR SPEC: 1.01 — SIGNIFICANT CHANGE UP (ref 1.01–1.02)
UROBILINOGEN FLD QL: 1 MG/DL
WBC # BLD: 4.8 K/UL — SIGNIFICANT CHANGE UP (ref 4.8–10.8)
WBC # FLD AUTO: 4.8 K/UL — SIGNIFICANT CHANGE UP (ref 4.8–10.8)

## 2018-01-29 PROCEDURE — 93010 ELECTROCARDIOGRAM REPORT: CPT

## 2018-01-29 PROCEDURE — 71046 X-RAY EXAM CHEST 2 VIEWS: CPT

## 2018-01-29 PROCEDURE — 99284 EMERGENCY DEPT VISIT MOD MDM: CPT

## 2018-01-29 PROCEDURE — 83690 ASSAY OF LIPASE: CPT

## 2018-01-29 PROCEDURE — 96376 TX/PRO/DX INJ SAME DRUG ADON: CPT

## 2018-01-29 PROCEDURE — 81001 URINALYSIS AUTO W/SCOPE: CPT

## 2018-01-29 PROCEDURE — 84100 ASSAY OF PHOSPHORUS: CPT

## 2018-01-29 PROCEDURE — 96374 THER/PROPH/DIAG INJ IV PUSH: CPT

## 2018-01-29 PROCEDURE — 80053 COMPREHEN METABOLIC PANEL: CPT

## 2018-01-29 PROCEDURE — 93005 ELECTROCARDIOGRAM TRACING: CPT

## 2018-01-29 PROCEDURE — 71046 X-RAY EXAM CHEST 2 VIEWS: CPT | Mod: 26

## 2018-01-29 PROCEDURE — 83735 ASSAY OF MAGNESIUM: CPT

## 2018-01-29 PROCEDURE — 85027 COMPLETE CBC AUTOMATED: CPT

## 2018-01-29 PROCEDURE — 99284 EMERGENCY DEPT VISIT MOD MDM: CPT | Mod: 25

## 2018-01-29 PROCEDURE — 36415 COLL VENOUS BLD VENIPUNCTURE: CPT

## 2018-01-29 RX ORDER — POTASSIUM CHLORIDE 20 MEQ
40 PACKET (EA) ORAL ONCE
Qty: 0 | Refills: 0 | Status: COMPLETED | OUTPATIENT
Start: 2018-01-29 | End: 2018-01-29

## 2018-01-29 RX ORDER — ONDANSETRON 8 MG/1
4 TABLET, FILM COATED ORAL ONCE
Qty: 0 | Refills: 0 | Status: COMPLETED | OUTPATIENT
Start: 2018-01-29 | End: 2018-01-29

## 2018-01-29 RX ORDER — ONDANSETRON 8 MG/1
1 TABLET, FILM COATED ORAL
Qty: 18 | Refills: 0 | OUTPATIENT
Start: 2018-01-29 | End: 2018-01-31

## 2018-01-29 RX ORDER — SODIUM CHLORIDE 9 MG/ML
250 INJECTION INTRAMUSCULAR; INTRAVENOUS; SUBCUTANEOUS ONCE
Qty: 0 | Refills: 0 | Status: DISCONTINUED | OUTPATIENT
Start: 2018-01-29 | End: 2018-01-29

## 2018-01-29 RX ORDER — SODIUM CHLORIDE 9 MG/ML
1000 INJECTION INTRAMUSCULAR; INTRAVENOUS; SUBCUTANEOUS ONCE
Qty: 0 | Refills: 0 | Status: COMPLETED | OUTPATIENT
Start: 2018-01-29 | End: 2018-01-29

## 2018-01-29 RX ORDER — AZTREONAM 2 G
1 VIAL (EA) INJECTION
Qty: 10 | Refills: 0 | OUTPATIENT
Start: 2018-01-29 | End: 2018-02-02

## 2018-01-29 RX ADMIN — ONDANSETRON 4 MILLIGRAM(S): 8 TABLET, FILM COATED ORAL at 21:05

## 2018-01-29 RX ADMIN — ONDANSETRON 4 MILLIGRAM(S): 8 TABLET, FILM COATED ORAL at 21:30

## 2018-01-29 RX ADMIN — Medication 1 TABLET(S): at 21:04

## 2018-01-29 RX ADMIN — ONDANSETRON 4 MILLIGRAM(S): 8 TABLET, FILM COATED ORAL at 17:39

## 2018-01-29 RX ADMIN — SODIUM CHLORIDE 1000 MILLILITER(S): 9 INJECTION INTRAMUSCULAR; INTRAVENOUS; SUBCUTANEOUS at 17:40

## 2018-01-29 RX ADMIN — Medication 40 MILLIEQUIVALENT(S): at 21:04

## 2018-01-29 NOTE — ED PROVIDER NOTE - CONSTITUTIONAL, MLM
normal... Well appearing, thin, well nourished, awake, alert, oriented to person, place, time/situation and in no apparent distress.

## 2018-01-29 NOTE — ED ADULT NURSE NOTE - OBJECTIVE STATEMENT
pt presents to ED hx of vocal cord paralysis and lung cancer  c/o nausea and generalized weakness x 4 days.   She denies fever, abdominal pain, diarrhea, sick contacts, and recent travel.  Patient is not currently on chemotherapy because it was discontinued prematurely  2 months ago due to severe patient weakness.  Patient's family states that it was supposed to be restarted in a few weeks

## 2018-01-29 NOTE — ED PROVIDER NOTE - CARE PLAN
Principal Discharge DX:	Nausea  Secondary Diagnosis:	Urinary tract infection without hematuria, site unspecified

## 2018-01-29 NOTE — ED ADULT NURSE REASSESSMENT NOTE - NS ED NURSE REASSESS COMMENT FT1
Pt able to ambulate safely and steadily w/out assistance, able to tolerate PO fluids and PO medications, denies dizziness/weakness upon standing, port deaccessed, pt d/c home w/ family.

## 2018-01-29 NOTE — ED PROVIDER NOTE - OBJECTIVE STATEMENT
This patient is a 62 year old woman hx of vocal cord paralysis and lung cancer who presents to the ER c/o nausea and generalized weakness x 4 days.  She had one episode of vomiting the day the symptoms began but had no further episodes of nausea.  Patient feels that the symptoms worsened today so wanted to come tot eh ER.  She denies fever, abdominal pain, diarrhea, sick contacts, and recent travel.  Patient is not currently on chemotherapy because it was discontinued prematurely  2 months ago due to severe patient weakness.  Patient's family states that it was supposed to be restarted in a few weeks

## 2018-01-29 NOTE — ED ADULT NURSE REASSESSMENT NOTE - NS ED NURSE REASSESS COMMENT FT1
Assuming care from previous RN, pt AOx4, denies SOB, resp even and unlabored, skin warm and dry, color good, denies pain, c/o nausea w/out vomiting, port on right chest wall accessed by previous RN, pt and family aware of plan of care, will continue to monitor.

## 2018-01-29 NOTE — ED STATDOCS - PROGRESS NOTE DETAILS
63 y/o female, PMH lung CA, presents to ED for cc weakness, nausea, anorexia. Pt was undergoing radiation and chemotherapy at [facility] until November 2017. Was advised at that time, no further treatment due to weakness. Pt reports she vomited three days ago and has had decreased appetite, inability to tolerate po since that time. Reports recent weight loss and extreme weakness. Will transfer to [facility].

## 2018-01-29 NOTE — ED ADULT TRIAGE NOTE - CHIEF COMPLAINT QUOTE
pt arrive with sister-in-law, pt c/o nausea, no energy, unable to eat for 3 days, vomiting anything she tried to eat. hx lung ca, last chemo november

## 2018-01-31 ENCOUNTER — INPATIENT (INPATIENT)
Facility: HOSPITAL | Age: 63
LOS: 8 days | Discharge: HOME | DRG: 682 | End: 2018-02-09
Attending: FAMILY MEDICINE | Admitting: HOSPITALIST
Payer: COMMERCIAL

## 2018-01-31 VITALS
HEIGHT: 64 IN | WEIGHT: 121.92 LBS | SYSTOLIC BLOOD PRESSURE: 82 MMHG | RESPIRATION RATE: 20 BRPM | DIASTOLIC BLOOD PRESSURE: 56 MMHG | HEART RATE: 68 BPM | TEMPERATURE: 97 F | OXYGEN SATURATION: 100 %

## 2018-01-31 DIAGNOSIS — N39.0 URINARY TRACT INFECTION, SITE NOT SPECIFIED: ICD-10-CM

## 2018-01-31 LAB
ALBUMIN SERPL ELPH-MCNC: 3.2 G/DL — LOW (ref 3.3–5.2)
ALP SERPL-CCNC: 113 U/L — SIGNIFICANT CHANGE UP (ref 40–120)
ALT FLD-CCNC: 14 U/L — SIGNIFICANT CHANGE UP
ANION GAP SERPL CALC-SCNC: 19 MMOL/L — HIGH (ref 5–17)
APPEARANCE UR: CLEAR — SIGNIFICANT CHANGE UP
AST SERPL-CCNC: 24 U/L — SIGNIFICANT CHANGE UP
BACTERIA # UR AUTO: ABNORMAL
BASOPHILS # BLD AUTO: 0 K/UL — SIGNIFICANT CHANGE UP (ref 0–0.2)
BASOPHILS NFR BLD AUTO: 0.6 % — SIGNIFICANT CHANGE UP (ref 0–2)
BILIRUB SERPL-MCNC: 0.3 MG/DL — LOW (ref 0.4–2)
BILIRUB UR-MCNC: ABNORMAL
BUN SERPL-MCNC: 18 MG/DL — SIGNIFICANT CHANGE UP (ref 8–20)
CALCIUM SERPL-MCNC: 9.5 MG/DL — SIGNIFICANT CHANGE UP (ref 8.6–10.2)
CHLORIDE SERPL-SCNC: 104 MMOL/L — SIGNIFICANT CHANGE UP (ref 98–107)
CO2 SERPL-SCNC: 18 MMOL/L — LOW (ref 22–29)
COLOR SPEC: SIGNIFICANT CHANGE UP
CREAT SERPL-MCNC: 1.95 MG/DL — HIGH (ref 0.5–1.3)
DIFF PNL FLD: ABNORMAL
EOSINOPHIL # BLD AUTO: 0 K/UL — SIGNIFICANT CHANGE UP (ref 0–0.5)
EOSINOPHIL NFR BLD AUTO: 0 % — SIGNIFICANT CHANGE UP (ref 0–6)
EPI CELLS # UR: ABNORMAL
GLUCOSE BLDC GLUCOMTR-MCNC: 80 MG/DL — SIGNIFICANT CHANGE UP (ref 70–99)
GLUCOSE SERPL-MCNC: 77 MG/DL — SIGNIFICANT CHANGE UP (ref 70–115)
GLUCOSE UR QL: NEGATIVE MG/DL — SIGNIFICANT CHANGE UP
GRAN CASTS # UR COMP ASSIST: SIGNIFICANT CHANGE UP /LPF
HCT VFR BLD CALC: 25.6 % — LOW (ref 37–47)
HCT VFR BLD CALC: 27 % — LOW (ref 37–47)
HGB BLD-MCNC: 8.6 G/DL — LOW (ref 12–16)
HGB BLD-MCNC: 9 G/DL — LOW (ref 12–16)
HYALINE CASTS # UR AUTO: ABNORMAL /LPF
INR BLD: 1.01 RATIO — SIGNIFICANT CHANGE UP (ref 0.88–1.16)
KETONES UR-MCNC: ABNORMAL
LACTATE BLDV-MCNC: 1.3 MMOL/L — SIGNIFICANT CHANGE UP (ref 0.5–2)
LEUKOCYTE ESTERASE UR-ACNC: ABNORMAL
LYMPHOCYTES # BLD AUTO: 0.9 K/UL — LOW (ref 1–4.8)
LYMPHOCYTES # BLD AUTO: 17.4 % — LOW (ref 20–55)
MCHC RBC-ENTMCNC: 33.3 G/DL — SIGNIFICANT CHANGE UP (ref 32–36)
MCHC RBC-ENTMCNC: 33.6 G/DL — SIGNIFICANT CHANGE UP (ref 32–36)
MCHC RBC-ENTMCNC: 33.6 PG — HIGH (ref 27–31)
MCHC RBC-ENTMCNC: 34.3 PG — HIGH (ref 27–31)
MCV RBC AUTO: 100.7 FL — HIGH (ref 81–99)
MCV RBC AUTO: 102 FL — HIGH (ref 81–99)
MONOCYTES # BLD AUTO: 0.4 K/UL — SIGNIFICANT CHANGE UP (ref 0–0.8)
MONOCYTES NFR BLD AUTO: 7.6 % — SIGNIFICANT CHANGE UP (ref 3–10)
NEUTROPHILS # BLD AUTO: 3.7 K/UL — SIGNIFICANT CHANGE UP (ref 1.8–8)
NEUTROPHILS NFR BLD AUTO: 74 % — HIGH (ref 37–73)
NITRITE UR-MCNC: NEGATIVE — SIGNIFICANT CHANGE UP
PH UR: 6.5 — SIGNIFICANT CHANGE UP (ref 5–8)
PLATELET # BLD AUTO: 177 K/UL — SIGNIFICANT CHANGE UP (ref 150–400)
PLATELET # BLD AUTO: 178 K/UL — SIGNIFICANT CHANGE UP (ref 150–400)
POTASSIUM SERPL-MCNC: 3.5 MMOL/L — SIGNIFICANT CHANGE UP (ref 3.5–5.3)
POTASSIUM SERPL-SCNC: 3.5 MMOL/L — SIGNIFICANT CHANGE UP (ref 3.5–5.3)
PROT SERPL-MCNC: 6.4 G/DL — LOW (ref 6.6–8.7)
PROT UR-MCNC: 100 MG/DL
PROTHROM AB SERPL-ACNC: 11.1 SEC — SIGNIFICANT CHANGE UP (ref 9.8–12.7)
RAPID RVP RESULT: SIGNIFICANT CHANGE UP
RBC # BLD: 2.51 M/UL — LOW (ref 4.4–5.2)
RBC # BLD: 2.68 M/UL — LOW (ref 4.4–5.2)
RBC # FLD: 14.5 % — SIGNIFICANT CHANGE UP (ref 11–15.6)
RBC # FLD: 15 % — SIGNIFICANT CHANGE UP (ref 11–15.6)
RBC CASTS # UR COMP ASSIST: ABNORMAL /HPF (ref 0–4)
SODIUM SERPL-SCNC: 141 MMOL/L — SIGNIFICANT CHANGE UP (ref 135–145)
SP GR SPEC: 1.02 — SIGNIFICANT CHANGE UP (ref 1.01–1.02)
UROBILINOGEN FLD QL: NEGATIVE MG/DL — SIGNIFICANT CHANGE UP
WBC # BLD: 5 K/UL — SIGNIFICANT CHANGE UP (ref 4.8–10.8)
WBC # BLD: 5.1 K/UL — SIGNIFICANT CHANGE UP (ref 4.8–10.8)
WBC # FLD AUTO: 5 K/UL — SIGNIFICANT CHANGE UP (ref 4.8–10.8)
WBC # FLD AUTO: 5.1 K/UL — SIGNIFICANT CHANGE UP (ref 4.8–10.8)
WBC UR QL: ABNORMAL

## 2018-01-31 PROCEDURE — 71046 X-RAY EXAM CHEST 2 VIEWS: CPT | Mod: 26

## 2018-01-31 PROCEDURE — 99222 1ST HOSP IP/OBS MODERATE 55: CPT

## 2018-01-31 PROCEDURE — 99285 EMERGENCY DEPT VISIT HI MDM: CPT

## 2018-01-31 PROCEDURE — 93010 ELECTROCARDIOGRAM REPORT: CPT

## 2018-01-31 PROCEDURE — 70450 CT HEAD/BRAIN W/O DYE: CPT | Mod: 26

## 2018-01-31 PROCEDURE — 74176 CT ABD & PELVIS W/O CONTRAST: CPT | Mod: 26

## 2018-01-31 RX ORDER — CEFEPIME 1 G/1
1000 INJECTION, POWDER, FOR SOLUTION INTRAMUSCULAR; INTRAVENOUS ONCE
Qty: 0 | Refills: 0 | Status: COMPLETED | OUTPATIENT
Start: 2018-01-31 | End: 2018-01-31

## 2018-01-31 RX ORDER — CIPROFLOXACIN LACTATE 400MG/40ML
400 VIAL (ML) INTRAVENOUS EVERY 12 HOURS
Qty: 0 | Refills: 0 | Status: DISCONTINUED | OUTPATIENT
Start: 2018-01-31 | End: 2018-01-31

## 2018-01-31 RX ORDER — PANTOPRAZOLE SODIUM 20 MG/1
40 TABLET, DELAYED RELEASE ORAL
Qty: 0 | Refills: 0 | Status: DISCONTINUED | OUTPATIENT
Start: 2018-01-31 | End: 2018-02-03

## 2018-01-31 RX ORDER — SODIUM CHLORIDE 9 MG/ML
3 INJECTION INTRAMUSCULAR; INTRAVENOUS; SUBCUTANEOUS ONCE
Qty: 0 | Refills: 0 | Status: COMPLETED | OUTPATIENT
Start: 2018-01-31 | End: 2018-01-31

## 2018-01-31 RX ORDER — CLOPIDOGREL BISULFATE 75 MG/1
75 TABLET, FILM COATED ORAL DAILY
Qty: 0 | Refills: 0 | Status: DISCONTINUED | OUTPATIENT
Start: 2018-01-31 | End: 2018-02-09

## 2018-01-31 RX ORDER — ASPIRIN/CALCIUM CARB/MAGNESIUM 324 MG
81 TABLET ORAL DAILY
Qty: 0 | Refills: 0 | Status: DISCONTINUED | OUTPATIENT
Start: 2018-01-31 | End: 2018-02-09

## 2018-01-31 RX ORDER — ATENOLOL 25 MG/1
50 TABLET ORAL
Qty: 0 | Refills: 0 | Status: DISCONTINUED | OUTPATIENT
Start: 2018-01-31 | End: 2018-02-01

## 2018-01-31 RX ORDER — METOCLOPRAMIDE HCL 10 MG
10 TABLET ORAL ONCE
Qty: 0 | Refills: 0 | Status: COMPLETED | OUTPATIENT
Start: 2018-01-31 | End: 2018-01-31

## 2018-01-31 RX ORDER — ATORVASTATIN CALCIUM 80 MG/1
40 TABLET, FILM COATED ORAL AT BEDTIME
Qty: 0 | Refills: 0 | Status: DISCONTINUED | OUTPATIENT
Start: 2018-01-31 | End: 2018-02-09

## 2018-01-31 RX ORDER — CEFTRIAXONE 500 MG/1
1 INJECTION, POWDER, FOR SOLUTION INTRAMUSCULAR; INTRAVENOUS EVERY 24 HOURS
Qty: 0 | Refills: 0 | Status: DISCONTINUED | OUTPATIENT
Start: 2018-01-31 | End: 2018-02-02

## 2018-01-31 RX ORDER — ONDANSETRON 8 MG/1
4 TABLET, FILM COATED ORAL EVERY 4 HOURS
Qty: 0 | Refills: 0 | Status: DISCONTINUED | OUTPATIENT
Start: 2018-01-31 | End: 2018-02-01

## 2018-01-31 RX ORDER — AMLODIPINE BESYLATE 2.5 MG/1
2.5 TABLET ORAL DAILY
Qty: 0 | Refills: 0 | Status: DISCONTINUED | OUTPATIENT
Start: 2018-01-31 | End: 2018-02-09

## 2018-01-31 RX ORDER — FOLIC ACID 0.8 MG
1 TABLET ORAL DAILY
Qty: 0 | Refills: 0 | Status: DISCONTINUED | OUTPATIENT
Start: 2018-01-31 | End: 2018-02-09

## 2018-01-31 RX ORDER — PANTOPRAZOLE SODIUM 20 MG/1
1 TABLET, DELAYED RELEASE ORAL
Qty: 0 | Refills: 0 | COMMUNITY

## 2018-01-31 RX ORDER — SODIUM CHLORIDE 9 MG/ML
1000 INJECTION INTRAMUSCULAR; INTRAVENOUS; SUBCUTANEOUS
Qty: 0 | Refills: 0 | Status: DISCONTINUED | OUTPATIENT
Start: 2018-01-31 | End: 2018-02-06

## 2018-01-31 RX ORDER — SODIUM CHLORIDE 9 MG/ML
2000 INJECTION INTRAMUSCULAR; INTRAVENOUS; SUBCUTANEOUS ONCE
Qty: 0 | Refills: 0 | Status: COMPLETED | OUTPATIENT
Start: 2018-01-31 | End: 2018-01-31

## 2018-01-31 RX ORDER — LISINOPRIL 2.5 MG/1
40 TABLET ORAL DAILY
Qty: 0 | Refills: 0 | Status: DISCONTINUED | OUTPATIENT
Start: 2018-01-31 | End: 2018-02-09

## 2018-01-31 RX ADMIN — ONDANSETRON 4 MILLIGRAM(S): 8 TABLET, FILM COATED ORAL at 18:33

## 2018-01-31 RX ADMIN — CEFEPIME 100 MILLIGRAM(S): 1 INJECTION, POWDER, FOR SOLUTION INTRAMUSCULAR; INTRAVENOUS at 14:01

## 2018-01-31 RX ADMIN — Medication 10 MILLIGRAM(S): at 13:54

## 2018-01-31 RX ADMIN — CEFTRIAXONE 100 GRAM(S): 500 INJECTION, POWDER, FOR SOLUTION INTRAMUSCULAR; INTRAVENOUS at 18:33

## 2018-01-31 RX ADMIN — SODIUM CHLORIDE 40 MILLILITER(S): 9 INJECTION INTRAMUSCULAR; INTRAVENOUS; SUBCUTANEOUS at 18:14

## 2018-01-31 RX ADMIN — SODIUM CHLORIDE 1333.33 MILLILITER(S): 9 INJECTION INTRAMUSCULAR; INTRAVENOUS; SUBCUTANEOUS at 12:30

## 2018-01-31 RX ADMIN — SODIUM CHLORIDE 3 MILLILITER(S): 9 INJECTION INTRAMUSCULAR; INTRAVENOUS; SUBCUTANEOUS at 12:30

## 2018-01-31 NOTE — H&P ADULT - ASSESSMENT
63 yo F with h/o HTN, CAD s/p PCI with 5 stents (2008) on plavix,, metastatic lung cA with brain mets s/p radiation therapy (last 8/17) and chemotherapy (11/17), presents to the ED c/o nausea and emesis, onset 6 days ago.  Pt states that she was seen in the [facility] on Monday and was diagnosed with a UTI.  Pt was given abx and Zofran to treat her sx.  She notes that the medicine has not helped her sx.  Pt notes 3 episodes of emesis last night.  Pt has not eaten or drank fluids in 6 days.  Lung cancer has been treated with chemotherapy, but she has not been for a treatment since September.  Notes that she was told her scans look better and the cancer is shrinking.  Last MRI of the brain was 2 months ago.  Denies fever, chills, pain, or cough.  Treated at the [facility] for her cancer. Pt is  allergic to codeine and penicillin. Pt was hypotensive in ER, got bolus- bp is 113/63 now      1- UTI/SEPSIS  ID consult called, will wait for consult for abx  Pt is allergic to penicillin  Pt got cefipime in ER without any side effects    2-Dehydration  Ivf, zofran,     3-MERCEDES  c/w IVF, f/u labs in am    4-Lung cancer with brain mets  Pt is currently not on  chemotherapy or radiation therapy,   pt will f/u in [place] after discharge  Spoke to Hem/Onc [facility] ER service  will call for consultation if need arises    DVT prophylaxis-  Pneumatic compression device

## 2018-01-31 NOTE — H&P ADULT - HISTORY OF PRESENT ILLNESS
61 yo F with h/o HTN, CAD s/p PCI with 5 stents (2008) on plavix,, metastatic lung cA with brain mets s/p radiation therapy (last 8/17) and chemotherapy (11/17), presents to the ED c/o nausea and emesis, onset 6 days ago.  Pt states that she was seen in the [facility] on Monday and was diagnosed with a UTI.  Pt was given abx and Zofran to treat her sx.  She notes that the medicine has not helped her sx.  Pt notes 3 episodes of emesis last night.  Pt has not eaten or drank fluids in 6 days.  Lung cancer has been treated with chemotherapy, but she has not been for a treatment since September.  Notes that she was told her scans look better and the cancer is shrinking.  Last MRI of the brain was 2 months ago.  Denies fever, chills, pain, or cough.   Pt has taken steroids in the past, but does not like the side effects she feels from them. Treated at the [facility] for her cancer. Pt is  allergic to codeine and penicillin.

## 2018-01-31 NOTE — CONSULT NOTE ADULT - SUBJECTIVE AND OBJECTIVE BOX
[facility] [place] JOANN JOLLY MD FACP   COLLIN PETIT MD  Diplomates American Board of Internal Medicine and Infecctious Diseases  [phone]  [id number] TARA [id number]    61 y/o F, with hx of lung cancer that spread to her brain, HTN, and 5 stents in place, presents to the ED c/o nausea and emesis, onset 6 days ago.  Pt states that she was seen in the hospital on Monday and was diagnosed with a UTI BASED ON U/A NO CX DONE  AT THE TIME   Pt was given abx and Zofran to treat her sx.  She notes that the medicine has not helped her sx.  Pt notes 3 episodes of emesis last night.  Pt has not eaten or drank fluids in 6 days.  Lung cancer has been treated with chemotherapy, but she has not been for a treatment since September.  Notes that she was told her scans look better and the cancer is shrinking.  Last MRI of the brain was 2 months ago.  Denies fever, chills, pain, or cough.  Currently taking Plavix, Atenolol, and Uribel.  Pt has taken steroids in the past, but does not like the side effects she feels from them.  Allergic to codeine and penicillin.  Treated at the [facility] for her cancer.         PAST MEDICAL & SURGICAL HISTORY:  Lung cancer  Hypertension  S/P cardiac catheterization: 5 cardiac stents  No significant past surgical history      ANTIBIOTICS      Allergies    codeine (Unknown)  penicillin (Unknown)    Intolerances        SOCIAL HISTORY:    FAMILY HISTORY:  No pertinent family history in first degree relatives      Vital Signs Last 24 Hrs  T(C): 36.4 (2018 12:52), Max: 36.4 (2018 12:52)  T(F): 97.6 (2018 12:52), Max: 97.6 (2018 12:52)  HR: 59 (2018 12:52) (59 - 68)  BP: 113/63 (2018 12:52) (82/56 - 113/63)  BP(mean): --  RR: 18 (2018 12:52) (18 - 20)  SpO2: 100% (2018 12:52) (100% - 100%)  Drug Dosing Weight  Height (cm): 162.56 (2018 11:19)  Weight (kg): 55.3 (2018 11:19)  BMI (kg/m2): 20.9 (2018 11:19)  BSA (m2): 1.58 (2018 11:19)      REVIEW OF SYSTEMS:    CONSTITUTIONAL:  As per HPI.    HEENT:  Eyes:  No diplopia or blurred vision. ENT:  No earache, sore throat or runny nose.    CARDIOVASCULAR:  No pressure, squeezing, strangling, tightness, heaviness or aching about the chest, neck, axilla or epigastrium.    RESPIRATORY:  No cough, shortness of breath, PND or orthopnea.    GASTROINTESTINAL:  No nausea, vomiting or diarrhea.    GENITOURINARY:  No dysuria, frequency or urgency.    MUSCULOSKELETAL:  As per HPI.    SKIN:  No change in skin, hair or nails.    NEUROLOGIC:  No paresthesias, fasciculations, seizures or weakness.                  PHYSICAL EXAMINATION:    GENERAL: The patient is a well-developed, well-nourished _____in no apparent distress. ___ is alert and oriented x3.  PORT IN RIGHT CHEST WALL    VITAL SIGNS: T(C): 36.4 (18 @ 12:52), Max: 36.4 (18 @ 12:52)  HR: 59 (18 @ 12:52) (59 - 68)  BP: 113/63 (18 @ 12:52) (82/56 - 113/63)  RR: 18 (18 @ 12:52) (18 - 20)  SpO2: 100% (18 @ 12:52) (100% - 100%)  Wt(kg): --    HEENT: Head is normocephalic and atraumatic.  ANICTERIC  NECK: Supple. No carotid bruits.  No lymphadenopathy or thyromegaly.    LUNGS: COARSE BREATH SOUNDS    HEART: Regular rate and rhythm without murmur.    ABDOMEN: Soft, nontender, and nondistended.  Positive bowel sounds.  No hepatosplenomegaly was noted. NO REBOUND NO GUARDING    EXTREMITIES: NO EDEMA NO ERYTHEMA    NEUROLOGIC: NON FOCAL      SKIN: No ulceration or induration present. NO RASH        BLOOD CULTURES       URINE CX          LABS:                        9.0    5.0   )-----------( 178      ( 2018 12:52 )             27.0         141  |  104  |  18.0  ----------------------------<  77  3.5   |  18.0<L>  |  1.95<H>    Ca    9.5      2018 12:52  Phos  2.5       Mg     1.7         TPro  6.4<L>  /  Alb  3.2<L>  /  TBili  0.3<L>  /  DBili  x   /  AST  24  /  ALT  14  /  AlkPhos  113      PT/INR - ( 2018 12:52 )   PT: 11.1 sec;   INR: 1.01 ratio           Urinalysis Basic - ( 2018 14:10 )    Color: GREEN / Appearance: Clear / S.020 / pH: x  Gluc: x / Ketone: Trace  / Bili: Small / Urobili: Negative mg/dL   Blood: x / Protein: 100 mg/dL / Nitrite: Negative   Leuk Esterase: Trace / RBC: 3-5 /HPF / WBC 11-25   Sq Epi: x / Non Sq Epi: Moderate / Bacteria: Few        RADIOLOGY & ADDITIONAL STUDIES:      ASSESSMENT/PLAN   61 y/o F, with hx of lung cancer that spread to her brain, HTN, and 5 stents in place, presents to the ED c/o nausea and emesis, onset 6 days ago.  Pt states that she was seen in the hospital on Monday and was diagnosed with a UTI BASED ON U/A NO CX DONE  AT THE TIME   Pt was given abx and Zofran to treat her sx.  She notes that the medicine has not helped her sx.  Pt notes 3 episodes of emesis last night.  Pt has not eaten or drank fluids in 6 days.   PT RETURNS WITH INCREASED  FATIGUE  BLOOD CX AND URINE CX SENT   RECOMMEND  ROCEPHIN FOR POSSIBLE UTI  PT WITH PCN ALLERGY BUT TOLERATED CEFEPIME  CXR  NEG ON PREVIOUS ER VISIT  SUGGEST REPEAT CXR             COLLIN PATRICK MD

## 2018-01-31 NOTE — ED ADULT TRIAGE NOTE - CHIEF COMPLAINT QUOTE
pt reports being treated on  Monday for nausea and uti, here today because she is vomiting and meds are not working for nausea. hypotensive in triage

## 2018-01-31 NOTE — ED PROVIDER NOTE - MUSCULOSKELETAL, MLM
1+ Edema to lower extremities. Right chest wall chemo port. Spine appears normal, range of motion is not limited, no muscle or joint tenderness

## 2018-01-31 NOTE — ED PROVIDER NOTE - OBJECTIVE STATEMENT
63 y/o F, with hx of lung cancer that spread to her brain, HTN, and 5 stents in place, presents to the ED c/o nausea and emesis, onset 6 days ago.  Pt states that she was seen in the hospital on Monday and was diagnosed with a UTI.  Pt was given abx and Zofran to treat her sx.  She notes that the medicine has not helped her sx.  Pt notes 3 episodes of emesis last night.  Pt has not eaten or drank fluids in 6 days.  Lung cancer has been treated with chemotherapy, but she has not been for a treatment since September.  Notes that she was told her scans look better and the cancer is shrinking.  Last MRI of the brain was 2 months ago.  Denies fever, chills, pain, or cough.  Currently taking Plavix, Atenolol, and Uribel.  Pt has taken steroids in the past, but does not like the side effects she feels from them.  Allergic to codeine and penicillin.  Treated at the [facility] for her cancer.

## 2018-01-31 NOTE — H&P ADULT - NSHPPHYSICALEXAM_GEN_ALL_CORE
PHYSICAL EXAM:  Vital Signs Last 24 Hrs  T(C): 36.4 (31 Jan 2018 12:52), Max: 36.4 (31 Jan 2018 12:52)  T(F): 97.6 (31 Jan 2018 12:52), Max: 97.6 (31 Jan 2018 12:52)  HR: 59 (31 Jan 2018 12:52) (59 - 68)  BP: 113/63 (31 Jan 2018 12:52) (82/56 - 113/63)  BP(mean): --  RR: 18 (31 Jan 2018 12:52) (18 - 20)  SpO2: 100% (31 Jan 2018 12:52) (100% - 100%)      GENERAL: NAD, sitting in bed, comfortably  · EYES: EOMI  · CARDIAC: Normal rate, regular rhythm.  Heart sounds S1, S2.  · RESPIRATORY: Breath sounds clear and equal bilaterally.  · GASTROINTESTINAL: Abdomen soft, non-tender, no guarding.  · MUSCULOSKELETAL: 1+ Edema to lower extremities. Right chest wall chemo port. Spine appears normal, range of motion is not limited, no muscle or joint tenderness  · NEUROLOGICAL: Alert and oriented, no focal deficits, no motor or sensory deficits.

## 2018-02-01 DIAGNOSIS — C34.90 MALIGNANT NEOPLASM OF UNSPECIFIED PART OF UNSPECIFIED BRONCHUS OR LUNG: ICD-10-CM

## 2018-02-01 DIAGNOSIS — N30.00 ACUTE CYSTITIS WITHOUT HEMATURIA: ICD-10-CM

## 2018-02-01 DIAGNOSIS — R11.2 NAUSEA WITH VOMITING, UNSPECIFIED: ICD-10-CM

## 2018-02-01 DIAGNOSIS — E86.0 DEHYDRATION: ICD-10-CM

## 2018-02-01 LAB
ALBUMIN SERPL ELPH-MCNC: 2.9 G/DL — LOW (ref 3.3–5.2)
ALP SERPL-CCNC: 99 U/L — SIGNIFICANT CHANGE UP (ref 40–120)
ALT FLD-CCNC: 17 U/L — SIGNIFICANT CHANGE UP
ANION GAP SERPL CALC-SCNC: 15 MMOL/L — SIGNIFICANT CHANGE UP (ref 5–17)
AST SERPL-CCNC: 31 U/L — SIGNIFICANT CHANGE UP
BILIRUB SERPL-MCNC: 0.2 MG/DL — LOW (ref 0.4–2)
BUN SERPL-MCNC: 16 MG/DL — SIGNIFICANT CHANGE UP (ref 8–20)
CALCIUM SERPL-MCNC: 8.7 MG/DL — SIGNIFICANT CHANGE UP (ref 8.6–10.2)
CHLORIDE SERPL-SCNC: 105 MMOL/L — SIGNIFICANT CHANGE UP (ref 98–107)
CHOLEST SERPL-MCNC: 146 MG/DL — SIGNIFICANT CHANGE UP (ref 110–199)
CO2 SERPL-SCNC: 17 MMOL/L — LOW (ref 22–29)
CREAT SERPL-MCNC: 1.77 MG/DL — HIGH (ref 0.5–1.3)
CULTURE RESULTS: NO GROWTH — SIGNIFICANT CHANGE UP
GLUCOSE SERPL-MCNC: 73 MG/DL — SIGNIFICANT CHANGE UP (ref 70–115)
HCT VFR BLD CALC: 24 % — LOW (ref 37–47)
HDLC SERPL-MCNC: 47 MG/DL — LOW
HGB BLD-MCNC: 8 G/DL — LOW (ref 12–16)
LIPID PNL WITH DIRECT LDL SERPL: 66 MG/DL — SIGNIFICANT CHANGE UP
MCHC RBC-ENTMCNC: 33.3 G/DL — SIGNIFICANT CHANGE UP (ref 32–36)
MCHC RBC-ENTMCNC: 33.6 PG — HIGH (ref 27–31)
MCV RBC AUTO: 100.8 FL — HIGH (ref 81–99)
PLATELET # BLD AUTO: 153 K/UL — SIGNIFICANT CHANGE UP (ref 150–400)
POTASSIUM SERPL-MCNC: 2.9 MMOL/L — CRITICAL LOW (ref 3.5–5.3)
POTASSIUM SERPL-SCNC: 2.9 MMOL/L — CRITICAL LOW (ref 3.5–5.3)
PROT SERPL-MCNC: 5.6 G/DL — LOW (ref 6.6–8.7)
RBC # BLD: 2.38 M/UL — LOW (ref 4.4–5.2)
RBC # FLD: 15.1 % — SIGNIFICANT CHANGE UP (ref 11–15.6)
SODIUM SERPL-SCNC: 137 MMOL/L — SIGNIFICANT CHANGE UP (ref 135–145)
SPECIMEN SOURCE: SIGNIFICANT CHANGE UP
TOTAL CHOLESTEROL/HDL RATIO MEASUREMENT: 3 RATIO — LOW (ref 3.3–7.1)
TRIGL SERPL-MCNC: 163 MG/DL — SIGNIFICANT CHANGE UP (ref 10–200)
WBC # BLD: 4.9 K/UL — SIGNIFICANT CHANGE UP (ref 4.8–10.8)
WBC # FLD AUTO: 4.9 K/UL — SIGNIFICANT CHANGE UP (ref 4.8–10.8)

## 2018-02-01 PROCEDURE — 99233 SBSQ HOSP IP/OBS HIGH 50: CPT

## 2018-02-01 PROCEDURE — 99232 SBSQ HOSP IP/OBS MODERATE 35: CPT

## 2018-02-01 RX ORDER — POTASSIUM CHLORIDE 20 MEQ
40 PACKET (EA) ORAL EVERY 4 HOURS
Qty: 0 | Refills: 0 | Status: COMPLETED | OUTPATIENT
Start: 2018-02-01 | End: 2018-02-01

## 2018-02-01 RX ORDER — ONDANSETRON 8 MG/1
4 TABLET, FILM COATED ORAL EVERY 8 HOURS
Qty: 0 | Refills: 0 | Status: DISCONTINUED | OUTPATIENT
Start: 2018-02-01 | End: 2018-02-03

## 2018-02-01 RX ORDER — SACCHAROMYCES BOULARDII 250 MG
250 POWDER IN PACKET (EA) ORAL
Qty: 0 | Refills: 0 | Status: DISCONTINUED | OUTPATIENT
Start: 2018-02-01 | End: 2018-02-01

## 2018-02-01 RX ORDER — METOCLOPRAMIDE HCL 10 MG
10 TABLET ORAL THREE TIMES A DAY
Qty: 0 | Refills: 0 | Status: DISCONTINUED | OUTPATIENT
Start: 2018-02-01 | End: 2018-02-03

## 2018-02-01 RX ORDER — ATENOLOL 25 MG/1
50 TABLET ORAL DAILY
Qty: 0 | Refills: 0 | Status: DISCONTINUED | OUTPATIENT
Start: 2018-02-01 | End: 2018-02-09

## 2018-02-01 RX ADMIN — Medication 40 MILLIEQUIVALENT(S): at 13:57

## 2018-02-01 RX ADMIN — ONDANSETRON 4 MILLIGRAM(S): 8 TABLET, FILM COATED ORAL at 18:35

## 2018-02-01 RX ADMIN — Medication 10 MILLIGRAM(S): at 16:48

## 2018-02-01 RX ADMIN — AMLODIPINE BESYLATE 2.5 MILLIGRAM(S): 2.5 TABLET ORAL at 06:44

## 2018-02-01 RX ADMIN — LISINOPRIL 40 MILLIGRAM(S): 2.5 TABLET ORAL at 06:44

## 2018-02-01 RX ADMIN — Medication 40 MILLIEQUIVALENT(S): at 22:18

## 2018-02-01 RX ADMIN — ATORVASTATIN CALCIUM 40 MILLIGRAM(S): 80 TABLET, FILM COATED ORAL at 01:01

## 2018-02-01 RX ADMIN — CEFTRIAXONE 100 GRAM(S): 500 INJECTION, POWDER, FOR SOLUTION INTRAMUSCULAR; INTRAVENOUS at 18:58

## 2018-02-01 RX ADMIN — CLOPIDOGREL BISULFATE 75 MILLIGRAM(S): 75 TABLET, FILM COATED ORAL at 13:53

## 2018-02-01 RX ADMIN — SODIUM CHLORIDE 40 MILLILITER(S): 9 INJECTION INTRAMUSCULAR; INTRAVENOUS; SUBCUTANEOUS at 22:19

## 2018-02-01 RX ADMIN — ATORVASTATIN CALCIUM 40 MILLIGRAM(S): 80 TABLET, FILM COATED ORAL at 22:19

## 2018-02-01 RX ADMIN — Medication 10 MILLIGRAM(S): at 00:01

## 2018-02-01 RX ADMIN — Medication 81 MILLIGRAM(S): at 13:53

## 2018-02-01 RX ADMIN — ATENOLOL 50 MILLIGRAM(S): 25 TABLET ORAL at 06:44

## 2018-02-01 RX ADMIN — PANTOPRAZOLE SODIUM 40 MILLIGRAM(S): 20 TABLET, DELAYED RELEASE ORAL at 09:50

## 2018-02-01 RX ADMIN — Medication 10 MILLIGRAM(S): at 22:19

## 2018-02-01 RX ADMIN — Medication 1 MILLIGRAM(S): at 13:53

## 2018-02-01 RX ADMIN — Medication 40 MILLIEQUIVALENT(S): at 18:38

## 2018-02-01 RX ADMIN — ONDANSETRON 4 MILLIGRAM(S): 8 TABLET, FILM COATED ORAL at 09:50

## 2018-02-01 NOTE — PATIENT PROFILE ADULT. - ABILITY TO HEAR (WITH HEARING AID OR HEARING APPLIANCE IF NORMALLY USED):
Doc pt requested Tomosynthesis mammogram. Order pended.
Order on chart.
Pt called in requesting an order for a 2D/3D TMP mammogram.  Pt would like a confirmation call when her order is ready.
Adequate: hears normal conversation without difficulty

## 2018-02-02 DIAGNOSIS — E87.6 HYPOKALEMIA: ICD-10-CM

## 2018-02-02 LAB
ANION GAP SERPL CALC-SCNC: 16 MMOL/L — SIGNIFICANT CHANGE UP (ref 5–17)
BUN SERPL-MCNC: 15 MG/DL — SIGNIFICANT CHANGE UP (ref 8–20)
CALCIUM SERPL-MCNC: 8.8 MG/DL — SIGNIFICANT CHANGE UP (ref 8.6–10.2)
CHLORIDE SERPL-SCNC: 107 MMOL/L — SIGNIFICANT CHANGE UP (ref 98–107)
CO2 SERPL-SCNC: 14 MMOL/L — LOW (ref 22–29)
CREAT SERPL-MCNC: 1.76 MG/DL — HIGH (ref 0.5–1.3)
GLUCOSE SERPL-MCNC: 61 MG/DL — LOW (ref 70–115)
HCT VFR BLD CALC: 24.9 % — LOW (ref 37–47)
HGB BLD-MCNC: 8.3 G/DL — LOW (ref 12–16)
MAGNESIUM SERPL-MCNC: 1.6 MG/DL — SIGNIFICANT CHANGE UP (ref 1.6–2.6)
MCHC RBC-ENTMCNC: 33.3 G/DL — SIGNIFICANT CHANGE UP (ref 32–36)
MCHC RBC-ENTMCNC: 34.3 PG — HIGH (ref 27–31)
MCV RBC AUTO: 102.9 FL — HIGH (ref 81–99)
PHOSPHATE SERPL-MCNC: 1.8 MG/DL — LOW (ref 2.4–4.7)
PLATELET # BLD AUTO: 177 K/UL — SIGNIFICANT CHANGE UP (ref 150–400)
POTASSIUM SERPL-MCNC: 4.4 MMOL/L — SIGNIFICANT CHANGE UP (ref 3.5–5.3)
POTASSIUM SERPL-SCNC: 4.4 MMOL/L — SIGNIFICANT CHANGE UP (ref 3.5–5.3)
RBC # BLD: 2.42 M/UL — LOW (ref 4.4–5.2)
RBC # FLD: 14.4 % — SIGNIFICANT CHANGE UP (ref 11–15.6)
SODIUM SERPL-SCNC: 137 MMOL/L — SIGNIFICANT CHANGE UP (ref 135–145)
WBC # BLD: 4.9 K/UL — SIGNIFICANT CHANGE UP (ref 4.8–10.8)
WBC # FLD AUTO: 4.9 K/UL — SIGNIFICANT CHANGE UP (ref 4.8–10.8)

## 2018-02-02 PROCEDURE — 99233 SBSQ HOSP IP/OBS HIGH 50: CPT

## 2018-02-02 PROCEDURE — 99232 SBSQ HOSP IP/OBS MODERATE 35: CPT

## 2018-02-02 RX ORDER — METOCLOPRAMIDE HCL 10 MG
5 TABLET ORAL THREE TIMES A DAY
Qty: 0 | Refills: 0 | Status: DISCONTINUED | OUTPATIENT
Start: 2018-02-02 | End: 2018-02-03

## 2018-02-02 RX ORDER — DRONABINOL 2.5 MG
2.5 CAPSULE ORAL
Qty: 0 | Refills: 0 | Status: COMPLETED | OUTPATIENT
Start: 2018-02-02 | End: 2018-02-09

## 2018-02-02 RX ADMIN — Medication 81 MILLIGRAM(S): at 12:28

## 2018-02-02 RX ADMIN — Medication 2.5 MILLIGRAM(S): at 17:28

## 2018-02-02 RX ADMIN — ONDANSETRON 4 MILLIGRAM(S): 8 TABLET, FILM COATED ORAL at 17:28

## 2018-02-02 RX ADMIN — ATENOLOL 50 MILLIGRAM(S): 25 TABLET ORAL at 06:19

## 2018-02-02 RX ADMIN — Medication 1 MILLIGRAM(S): at 12:28

## 2018-02-02 RX ADMIN — Medication 5 MILLIGRAM(S): at 14:49

## 2018-02-02 RX ADMIN — Medication 5 MILLIGRAM(S): at 10:21

## 2018-02-02 RX ADMIN — LISINOPRIL 40 MILLIGRAM(S): 2.5 TABLET ORAL at 06:19

## 2018-02-02 RX ADMIN — CLOPIDOGREL BISULFATE 75 MILLIGRAM(S): 75 TABLET, FILM COATED ORAL at 12:28

## 2018-02-02 RX ADMIN — ONDANSETRON 4 MILLIGRAM(S): 8 TABLET, FILM COATED ORAL at 08:38

## 2018-02-02 RX ADMIN — AMLODIPINE BESYLATE 2.5 MILLIGRAM(S): 2.5 TABLET ORAL at 06:19

## 2018-02-02 RX ADMIN — PANTOPRAZOLE SODIUM 40 MILLIGRAM(S): 20 TABLET, DELAYED RELEASE ORAL at 06:19

## 2018-02-02 RX ADMIN — ATORVASTATIN CALCIUM 40 MILLIGRAM(S): 80 TABLET, FILM COATED ORAL at 23:22

## 2018-02-02 NOTE — DIETITIAN INITIAL EVALUATION ADULT. - PERTINENT LABORATORY DATA
02-02 Na137 mmol/L Glu 61 mg/dL<L> K+ 4.4 mmol/L Cr  1.76 mg/dL<H> BUN 15.0 mg/dL Phos 1.8 mg/dL<L> Alb n/a   PAB n/a

## 2018-02-02 NOTE — PROGRESS NOTE ADULT - PROBLEM SELECTOR PLAN 5
K normal after replacement. She has had very poor oral intake resulting in hypokalemia. Monitor k closely.

## 2018-02-02 NOTE — CHART NOTE - NSCHARTNOTEFT_GEN_A_CORE
Upon Nutritional Assessment by the Registered Dietitian your patient was determined to meet criteria / has evidence of the following diagnosis/diagnoses:          [ ]  Mild Protein Calorie Malnutrition        [ ]  Moderate Protein Calorie Malnutrition        [x ] Severe Protein Calorie Malnutrition        [ ] Unspecified Protein Calorie Malnutrition        [ ] Underweight / BMI <19        [ ] Morbid Obesity / BMI > 40      Findings as based on:  •  Comprehensive nutrition assessment and consultation  •  Calorie counts (nutrient intake analysis)  •  Food acceptance and intake status from observations by staff  •  Follow up  •  Patient education  •  Intervention secondary to interdisciplinary rounds  •   concerns      Treatment:    The following diet has been recommended:  rec clears with ensure clears TID, Marinol      PROVIDER Section:     By signing this assessment you are acknowledging and agree with the diagnosis/diagnoses assigned by the Registered Dietitian    Comments:

## 2018-02-03 PROCEDURE — 99223 1ST HOSP IP/OBS HIGH 75: CPT

## 2018-02-03 PROCEDURE — 99497 ADVNCD CARE PLAN 30 MIN: CPT

## 2018-02-03 PROCEDURE — 99233 SBSQ HOSP IP/OBS HIGH 50: CPT | Mod: GC

## 2018-02-03 RX ORDER — PANTOPRAZOLE SODIUM 20 MG/1
40 TABLET, DELAYED RELEASE ORAL
Qty: 0 | Refills: 0 | Status: DISCONTINUED | OUTPATIENT
Start: 2018-02-03 | End: 2018-02-09

## 2018-02-03 RX ORDER — ONDANSETRON 8 MG/1
4 TABLET, FILM COATED ORAL EVERY 6 HOURS
Qty: 0 | Refills: 0 | Status: DISCONTINUED | OUTPATIENT
Start: 2018-02-03 | End: 2018-02-08

## 2018-02-03 RX ORDER — METOCLOPRAMIDE HCL 10 MG
10 TABLET ORAL
Qty: 0 | Refills: 0 | Status: DISCONTINUED | OUTPATIENT
Start: 2018-02-03 | End: 2018-02-08

## 2018-02-03 RX ORDER — METOCLOPRAMIDE HCL 10 MG
10 TABLET ORAL EVERY 8 HOURS
Qty: 0 | Refills: 0 | Status: COMPLETED | OUTPATIENT
Start: 2018-02-03 | End: 2018-02-03

## 2018-02-03 RX ADMIN — ONDANSETRON 4 MILLIGRAM(S): 8 TABLET, FILM COATED ORAL at 18:11

## 2018-02-03 RX ADMIN — Medication 10 MILLIGRAM(S): at 18:11

## 2018-02-03 RX ADMIN — SODIUM CHLORIDE 40 MILLILITER(S): 9 INJECTION INTRAMUSCULAR; INTRAVENOUS; SUBCUTANEOUS at 18:12

## 2018-02-03 RX ADMIN — ONDANSETRON 4 MILLIGRAM(S): 8 TABLET, FILM COATED ORAL at 06:44

## 2018-02-03 RX ADMIN — Medication 81 MILLIGRAM(S): at 11:44

## 2018-02-03 RX ADMIN — CLOPIDOGREL BISULFATE 75 MILLIGRAM(S): 75 TABLET, FILM COATED ORAL at 11:44

## 2018-02-03 RX ADMIN — Medication 2.5 MILLIGRAM(S): at 18:11

## 2018-02-03 RX ADMIN — Medication 1 MILLIGRAM(S): at 11:44

## 2018-02-03 RX ADMIN — Medication 10 MILLIGRAM(S): at 14:20

## 2018-02-03 RX ADMIN — PANTOPRAZOLE SODIUM 40 MILLIGRAM(S): 20 TABLET, DELAYED RELEASE ORAL at 18:11

## 2018-02-03 NOTE — CONSULT NOTE ADULT - PROBLEM SELECTOR RECOMMENDATION 9
Patient with lung cancer with mets to brain   ? consider MRI brain to better asses mets. ? central etiology of nuasea /vomiting   Would change the reglan, zofran to IV around the clock and increase protonix to bid.    If patient does not improve in few days then consider EGD to r/o PUD

## 2018-02-03 NOTE — CONSULT NOTE ADULT - SUBJECTIVE AND OBJECTIVE BOX
Patient is a 62y old  Female who presents with a chief complaint of dehydration, vomiting, UTI (31 Jan 2018 16:19)      HPI:  63 yo F with h/o HTN, CAD s/p PCI with 5 stents (2008) on plavix,, metastatic lung cA with brain mets s/p radiation therapy (last 8/17) and chemotherapy (11/17), presents to the ED c/o nausea and emesis, onset 10 days ago.  Patient hx of lung Cancer with mets to brain. Finished radiation 8/17, finished chemo 11/17. Began with severe nausea about 10 days ago. Vomiting 1 time a day. Nausea lasts all day. NO abdominal pain. NO constipation. Went to ER for nausea , told she had UTI based on U/A ( no culture) and was given antibiotics and sent home. Nausea and vomiting did not improved. Came back to ER. Urine culture and blood culture negative. CT head showed microvascular changes ( but suggest MRI if concerned for mets),  CT abdomen was negative for obstruction, Possible thickened T colon ( maybe under distension), sigmoid diverticulosis. Pt was given Rocephin and then stopped yesterday when cultures were negative.  Had diarrhea when on rocephin but no diarrhea today.       Patient is on reglan prn, zofran prn, protonx po qd          REVIEW OF SYSTEMS:  Constitutional: No fever, weight loss or fatigue  ENMT:  No difficulty hearing, tinnitus, vertigo; No sinus or throat pain  Respiratory: No cough, wheezing, chills or hemoptysis  Cardiovascular: No chest pain, palpitations, dizziness or leg swelling  Gastrointestinal: No abdominal or epigastric pain. + nausea, + bilious vomiting or hematemesis; No diarrhea or constipation. No melena or hematochezia.  Skin: No itching, burning, rashes or lesions   Musculoskeletal: No joint pain or swelling; No muscle, back or extremity pain    PAST MEDICAL & SURGICAL HISTORY:  Lung cancer  Hypertension  S/P cardiac catheterization: 5 cardiac stents  No significant past surgical history      FAMILY HISTORY:  No pertinent family history in first degree relatives      SOCIAL HISTORY:  Smoking Status: [ ] Current, [ ] Former, [ ] Never  Pack Years:  [  ] EtOH-no  [  ] IVDA    MEDICATIONS:  MEDICATIONS  (STANDING):  amLODIPine   Tablet 2.5 milliGRAM(s) Oral daily  aspirin enteric coated 81 milliGRAM(s) Oral daily  ATENolol  Tablet 50 milliGRAM(s) Oral daily  atorvastatin 40 milliGRAM(s) Oral at bedtime  clopidogrel Tablet 75 milliGRAM(s) Oral daily  dronabinol 2.5 milliGRAM(s) Oral two times a day  folic acid 1 milliGRAM(s) Oral daily  lisinopril 40 milliGRAM(s) Oral daily  pantoprazole    Tablet 40 milliGRAM(s) Oral before breakfast  sodium chloride 0.9%. 1000 milliLiter(s) (40 mL/Hr) IV Continuous <Continuous>    MEDICATIONS  (PRN):  metoclopramide 10 milliGRAM(s) Oral three times a day PRN nausea  metoclopramide 5 milliGRAM(s) Oral three times a day PRN nausea and/or vomiting  ondansetron Injectable 4 milliGRAM(s) IV Push every 8 hours PRN Nausea and/or Vomiting      Allergies    codeine (Unknown)  penicillin (Unknown)    Intolerances        Vital Signs Last 24 Hrs  T(C): 36.1 (03 Feb 2018 08:39), Max: 36.9 (02 Feb 2018 23:20)  T(F): 97 (03 Feb 2018 08:39), Max: 98.4 (02 Feb 2018 23:20)  HR: 52 (03 Feb 2018 08:39) (52 - 58)  BP: 145/77 (03 Feb 2018 08:39) (132/76 - 148/76)  BP(mean): --  RR: 16 (03 Feb 2018 08:39) (16 - 20)  SpO2: 97% (03 Feb 2018 08:39) (97% - 99%)    02-02 @ 07:01 - 02-03 @ 07:00  --------------------------------------------------------  IN: 1720 mL / OUT: 2 mL / NET: 1718 mL    02-03 @ 07:01  -  02-03 @ 12:26  --------------------------------------------------------  IN: 160 mL / OUT: 0 mL / NET: 160 mL          PHYSICAL EXAM:    General: Well developed; well nourished; in no acute distress  HEENT: MMM, conjunctiva and sclera clear  H- RRR  L- CTA  Gastrointestinal: Soft, non-tender non-distended; Normal bowel sounds; No rebound or guarding  Extremities: Normal range of motion, No clubbing, cyanosis or edema  Neurological: Alert and oriented x3  Skin: Warm and dry. No obvious rash      LABS:                        8.3    4.9   )-----------( 177      ( 02 Feb 2018 07:29 )             24.9     02 Feb 2018 07:24    137    |  107    |  15.0   ----------------------------<  61     4.4     |  14.0   |  1.76     Ca    8.8        02 Feb 2018 07:24  Phos  1.8       02 Feb 2018 07:24  Mg     1.6       02 Feb 2018 07:24                RADIOLOGY & ADDITIONAL STUDIES:     < from: CT Abdomen and Pelvis No Cont (01.31.18 @ 12:40) >  IMPRESSION:     No acute findings provided the limitation of a noncontrast exam.    Question minimal colitis.    Incidental findings in the biliary tree, right kidney and bones as   described.    < end of copied text >

## 2018-02-04 LAB
ANION GAP SERPL CALC-SCNC: 16 MMOL/L — SIGNIFICANT CHANGE UP (ref 5–17)
BUN SERPL-MCNC: 13 MG/DL — SIGNIFICANT CHANGE UP (ref 8–20)
CALCIUM SERPL-MCNC: 9 MG/DL — SIGNIFICANT CHANGE UP (ref 8.6–10.2)
CHLORIDE SERPL-SCNC: 104 MMOL/L — SIGNIFICANT CHANGE UP (ref 98–107)
CO2 SERPL-SCNC: 17 MMOL/L — LOW (ref 22–29)
CREAT SERPL-MCNC: 1.43 MG/DL — HIGH (ref 0.5–1.3)
GLUCOSE SERPL-MCNC: 70 MG/DL — SIGNIFICANT CHANGE UP (ref 70–115)
HCT VFR BLD CALC: 25.3 % — LOW (ref 37–47)
HGB BLD-MCNC: 8.6 G/DL — LOW (ref 12–16)
MAGNESIUM SERPL-MCNC: 1.7 MG/DL — SIGNIFICANT CHANGE UP (ref 1.6–2.6)
MCHC RBC-ENTMCNC: 34 G/DL — SIGNIFICANT CHANGE UP (ref 32–36)
MCHC RBC-ENTMCNC: 34.4 PG — HIGH (ref 27–31)
MCV RBC AUTO: 101.2 FL — HIGH (ref 81–99)
PHOSPHATE SERPL-MCNC: 2.1 MG/DL — LOW (ref 2.4–4.7)
PLATELET # BLD AUTO: 158 K/UL — SIGNIFICANT CHANGE UP (ref 150–400)
POTASSIUM SERPL-MCNC: 3.5 MMOL/L — SIGNIFICANT CHANGE UP (ref 3.5–5.3)
POTASSIUM SERPL-SCNC: 3.5 MMOL/L — SIGNIFICANT CHANGE UP (ref 3.5–5.3)
RBC # BLD: 2.5 M/UL — LOW (ref 4.4–5.2)
RBC # FLD: 14.5 % — SIGNIFICANT CHANGE UP (ref 11–15.6)
SODIUM SERPL-SCNC: 137 MMOL/L — SIGNIFICANT CHANGE UP (ref 135–145)
WBC # BLD: 3.1 K/UL — LOW (ref 4.8–10.8)
WBC # FLD AUTO: 3.1 K/UL — LOW (ref 4.8–10.8)

## 2018-02-04 PROCEDURE — 99232 SBSQ HOSP IP/OBS MODERATE 35: CPT

## 2018-02-04 PROCEDURE — 99233 SBSQ HOSP IP/OBS HIGH 50: CPT | Mod: GC

## 2018-02-04 RX ORDER — HYDRALAZINE HCL 50 MG
10 TABLET ORAL ONCE
Qty: 0 | Refills: 0 | Status: COMPLETED | OUTPATIENT
Start: 2018-02-04 | End: 2018-02-04

## 2018-02-04 RX ADMIN — Medication 10 MILLIGRAM(S): at 08:43

## 2018-02-04 RX ADMIN — ATENOLOL 50 MILLIGRAM(S): 25 TABLET ORAL at 05:24

## 2018-02-04 RX ADMIN — Medication 1 MILLIGRAM(S): at 12:17

## 2018-02-04 RX ADMIN — CLOPIDOGREL BISULFATE 75 MILLIGRAM(S): 75 TABLET, FILM COATED ORAL at 12:17

## 2018-02-04 RX ADMIN — LISINOPRIL 40 MILLIGRAM(S): 2.5 TABLET ORAL at 05:24

## 2018-02-04 RX ADMIN — Medication 10 MILLIGRAM(S): at 12:17

## 2018-02-04 RX ADMIN — ATORVASTATIN CALCIUM 40 MILLIGRAM(S): 80 TABLET, FILM COATED ORAL at 00:27

## 2018-02-04 RX ADMIN — Medication 81 MILLIGRAM(S): at 12:17

## 2018-02-04 RX ADMIN — ONDANSETRON 4 MILLIGRAM(S): 8 TABLET, FILM COATED ORAL at 00:27

## 2018-02-04 RX ADMIN — Medication 2.5 MILLIGRAM(S): at 05:23

## 2018-02-04 RX ADMIN — Medication 10 MILLIGRAM(S): at 17:21

## 2018-02-04 RX ADMIN — Medication 10 MILLIGRAM(S): at 17:20

## 2018-02-04 RX ADMIN — PANTOPRAZOLE SODIUM 40 MILLIGRAM(S): 20 TABLET, DELAYED RELEASE ORAL at 05:24

## 2018-02-04 RX ADMIN — PANTOPRAZOLE SODIUM 40 MILLIGRAM(S): 20 TABLET, DELAYED RELEASE ORAL at 17:20

## 2018-02-04 RX ADMIN — SODIUM CHLORIDE 40 MILLILITER(S): 9 INJECTION INTRAMUSCULAR; INTRAVENOUS; SUBCUTANEOUS at 12:18

## 2018-02-04 RX ADMIN — ONDANSETRON 4 MILLIGRAM(S): 8 TABLET, FILM COATED ORAL at 12:17

## 2018-02-04 RX ADMIN — Medication 2.5 MILLIGRAM(S): at 17:20

## 2018-02-04 RX ADMIN — AMLODIPINE BESYLATE 2.5 MILLIGRAM(S): 2.5 TABLET ORAL at 05:24

## 2018-02-04 RX ADMIN — ONDANSETRON 4 MILLIGRAM(S): 8 TABLET, FILM COATED ORAL at 17:20

## 2018-02-04 RX ADMIN — ONDANSETRON 4 MILLIGRAM(S): 8 TABLET, FILM COATED ORAL at 05:24

## 2018-02-04 NOTE — PROGRESS NOTE ADULT - ATTENDING COMMENTS
Will call Dr. Luna in am  Awaiting palliative eval - pt plans to f/u for chemoTx after will get better

## 2018-02-04 NOTE — PROGRESS NOTE ADULT - PROBLEM SELECTOR PLAN 3
IVF hydration with improved prerenal azotemia and around the clock zofran/reglan  - diet as tolerated

## 2018-02-05 LAB
ANION GAP SERPL CALC-SCNC: 22 MMOL/L — HIGH (ref 5–17)
BUN SERPL-MCNC: 10 MG/DL — SIGNIFICANT CHANGE UP (ref 8–20)
CALCIUM SERPL-MCNC: 8.9 MG/DL — SIGNIFICANT CHANGE UP (ref 8.6–10.2)
CHLORIDE SERPL-SCNC: 101 MMOL/L — SIGNIFICANT CHANGE UP (ref 98–107)
CO2 SERPL-SCNC: 16 MMOL/L — LOW (ref 22–29)
CREAT SERPL-MCNC: 1.31 MG/DL — HIGH (ref 0.5–1.3)
CULTURE RESULTS: SIGNIFICANT CHANGE UP
CULTURE RESULTS: SIGNIFICANT CHANGE UP
GLUCOSE SERPL-MCNC: 65 MG/DL — LOW (ref 70–115)
HCT VFR BLD CALC: 25 % — LOW (ref 37–47)
HGB BLD-MCNC: 8.4 G/DL — LOW (ref 12–16)
MCHC RBC-ENTMCNC: 33.5 PG — HIGH (ref 27–31)
MCHC RBC-ENTMCNC: 33.6 G/DL — SIGNIFICANT CHANGE UP (ref 32–36)
MCV RBC AUTO: 99.6 FL — HIGH (ref 81–99)
PLATELET # BLD AUTO: 153 K/UL — SIGNIFICANT CHANGE UP (ref 150–400)
POTASSIUM SERPL-MCNC: 3.1 MMOL/L — LOW (ref 3.5–5.3)
POTASSIUM SERPL-SCNC: 3.1 MMOL/L — LOW (ref 3.5–5.3)
RBC # BLD: 2.51 M/UL — LOW (ref 4.4–5.2)
RBC # FLD: 14.4 % — SIGNIFICANT CHANGE UP (ref 11–15.6)
SODIUM SERPL-SCNC: 139 MMOL/L — SIGNIFICANT CHANGE UP (ref 135–145)
SPECIMEN SOURCE: SIGNIFICANT CHANGE UP
SPECIMEN SOURCE: SIGNIFICANT CHANGE UP
WBC # BLD: 2.9 K/UL — LOW (ref 4.8–10.8)
WBC # FLD AUTO: 2.9 K/UL — LOW (ref 4.8–10.8)

## 2018-02-05 PROCEDURE — 99222 1ST HOSP IP/OBS MODERATE 55: CPT

## 2018-02-05 PROCEDURE — 99232 SBSQ HOSP IP/OBS MODERATE 35: CPT

## 2018-02-05 PROCEDURE — 99233 SBSQ HOSP IP/OBS HIGH 50: CPT | Mod: GC

## 2018-02-05 RX ORDER — POTASSIUM CHLORIDE 20 MEQ
40 PACKET (EA) ORAL EVERY 4 HOURS
Qty: 0 | Refills: 0 | Status: COMPLETED | OUTPATIENT
Start: 2018-02-05 | End: 2018-02-05

## 2018-02-05 RX ADMIN — ONDANSETRON 4 MILLIGRAM(S): 8 TABLET, FILM COATED ORAL at 06:36

## 2018-02-05 RX ADMIN — AMLODIPINE BESYLATE 2.5 MILLIGRAM(S): 2.5 TABLET ORAL at 06:37

## 2018-02-05 RX ADMIN — ATENOLOL 50 MILLIGRAM(S): 25 TABLET ORAL at 06:36

## 2018-02-05 RX ADMIN — LISINOPRIL 40 MILLIGRAM(S): 2.5 TABLET ORAL at 06:36

## 2018-02-05 RX ADMIN — ONDANSETRON 4 MILLIGRAM(S): 8 TABLET, FILM COATED ORAL at 17:24

## 2018-02-05 RX ADMIN — CLOPIDOGREL BISULFATE 75 MILLIGRAM(S): 75 TABLET, FILM COATED ORAL at 11:31

## 2018-02-05 RX ADMIN — Medication 10 MILLIGRAM(S): at 08:07

## 2018-02-05 RX ADMIN — Medication 10 MILLIGRAM(S): at 11:31

## 2018-02-05 RX ADMIN — ONDANSETRON 4 MILLIGRAM(S): 8 TABLET, FILM COATED ORAL at 00:09

## 2018-02-05 RX ADMIN — ATORVASTATIN CALCIUM 40 MILLIGRAM(S): 80 TABLET, FILM COATED ORAL at 00:10

## 2018-02-05 RX ADMIN — Medication 2.5 MILLIGRAM(S): at 17:24

## 2018-02-05 RX ADMIN — PANTOPRAZOLE SODIUM 40 MILLIGRAM(S): 20 TABLET, DELAYED RELEASE ORAL at 06:36

## 2018-02-05 RX ADMIN — Medication 1 MILLIGRAM(S): at 11:31

## 2018-02-05 RX ADMIN — Medication 40 MILLIEQUIVALENT(S): at 09:40

## 2018-02-05 RX ADMIN — Medication 81 MILLIGRAM(S): at 11:31

## 2018-02-05 RX ADMIN — PANTOPRAZOLE SODIUM 40 MILLIGRAM(S): 20 TABLET, DELAYED RELEASE ORAL at 17:24

## 2018-02-05 RX ADMIN — Medication 10 MILLIGRAM(S): at 17:24

## 2018-02-05 RX ADMIN — Medication 2.5 MILLIGRAM(S): at 06:36

## 2018-02-05 RX ADMIN — ATORVASTATIN CALCIUM 40 MILLIGRAM(S): 80 TABLET, FILM COATED ORAL at 21:54

## 2018-02-05 RX ADMIN — Medication 40 MILLIEQUIVALENT(S): at 11:31

## 2018-02-05 NOTE — PHYSICAL THERAPY INITIAL EVALUATION ADULT - CRITERIA FOR SKILLED THERAPEUTIC INTERVENTIONS
impairments found/therapy frequency/anticipated discharge recommendation/risk reduction/prevention/rehab potential/anticipated equipment needs at discharge/functional limitations in following categories/predicted duration of therapy intervention

## 2018-02-05 NOTE — CONSULT NOTE ADULT - ASSESSMENT
This is a 63YO F with Metastatic Lung Cancer to Brain Post RT and Chemotherapy    Admitted in [facility]carb[facility] Hypoxic Respiratory Failure    Nausea/Vomiting cannot eat  Reglan/ Zofran  Marinol on board    Anxious/Depressed-Major Denial not willing to talk to myself or RN    UTI-Sepsis    Needs Goals of Care Conversation-  will visit everyday build a rapport speak top her daughters provide community resources

## 2018-02-05 NOTE — PROGRESS NOTE ADULT - ATTENDING COMMENTS
Will call Dr. Luna in am  Awaiting palliative eval - pt plans to f/u for chemoTx after will get better  HTN - atenolol, lisinopril, hydralazine PRN

## 2018-02-05 NOTE — PHYSICAL THERAPY INITIAL EVALUATION ADULT - PERTINENT HX OF CURRENT PROBLEM, REHAB EVAL
62 y.o. Female with PMHx of HTN, CAD s/p PCI with 5 stents (2008) on plavix,, metastatic lung CA with brain mets s/p radiation therapy (last 8/17) and chemotherapy (11/17), presents to the ED c/o nausea and emesis

## 2018-02-05 NOTE — CONSULT NOTE ADULT - SUBJECTIVE AND OBJECTIVE BOX
HPI:  This is a 63yo F with PMH of Metastatic Lung Cancer admitted with worsening SOB, confusion found to have Mets to Brain and Sepsis with resistnat UTI failed oral ABX treatment in community.  She is alert voice is low pitched and Hoarse (pre-exisitngvocal chord paralysis)    She is very closed off and short tempered. Does not want to talk about her hospitalization, diagnosis, symptoms or designate a HCP.  Her daughter was at bedside at time of my consult, she remained quiet throughout our conversation. She denies pain, nausea is much better controlled-not able to eat due to persistent intractable   N/V. She was OOB/ambulated yesterday with O2 tank    63 yo F with h/o HTN, CAD s/p PCI with 5 stents (2008) on plavix,, metastatic lung cA with brain mets s/p radiation therapy (last 8/17) and chemotherapy (11/17), presents to the ED c/o nausea and emesis, onset 6 days ago.  Pt states that she was seen in the [facility] on Monday and was diagnosed with a UTI.  Pt was given abx and Zofran to treat her sx.  She notes that the medicine has not helped her sx.  Pt notes 3 episodes of emesis last night.  Pt has not eaten or drank fluids in 6 days.  Lung cancer has been treated with chemotherapy, but she has not been for a treatment since September.  Notes that she was told her scans look better and the cancer is shrinking.  Last MRI of the brain was 2 months ago.  Denies fever, chills, pain, or cough.   Pt has taken steroids in the past, but does not like the side effects she feels from them. Treated at the [facility] for her cancer. Pt is  allergic to codeine and penicillin. (31 Jan 2018 16:19)      PERTINENT PMH REVIEWED: Yes     PAST MEDICAL & SURGICAL HISTORY:  Lung cancer  Hypertension  S/P cardiac catheterization: 5 cardiac stents  No significant past surgical history      SOCIAL HISTORY:  EtOH    No                                    Drugs    No                                  Fromer heavy smoker 40 years-Quit                                     Admitted from: home  shares her home with her daughter   NO HCP    FAMILY HISTORY:  No pertinent family history in first degree relatives    Allergies  codeine (Unknown)  penicillin (Unknown)    Intolerances    Baseline ADLs (prior to admission):  Independent/     Present Symptoms:     Dyspnea: 1 -2    Nausea/Vomiting: Yes  Anxiety:  Yes   Depression: Yes  Fatigue: Yes   Loss of appetite: Yes    Pain: Denies            Character-            Duration-            Effect-            Factors-            Frequency-            Location-            Severity-    Review of Systems: Reviewed                   All others negative    MEDICATIONS  (STANDING):  amLODIPine   Tablet 2.5 milliGRAM(s) Oral daily  aspirin enteric coated 81 milliGRAM(s) Oral daily  ATENolol  Tablet 50 milliGRAM(s) Oral daily  atorvastatin 40 milliGRAM(s) Oral at bedtime  clopidogrel Tablet 75 milliGRAM(s) Oral daily  dronabinol 2.5 milliGRAM(s) Oral two times a day  folic acid 1 milliGRAM(s) Oral daily  lisinopril 40 milliGRAM(s) Oral daily  metoclopramide Injectable 10 milliGRAM(s) IV Push <User Schedule>  ondansetron Injectable 4 milliGRAM(s) IV Push every 6 hours  pantoprazole  Injectable 40 milliGRAM(s) IV Push two times a day  sodium chloride 0.9%. 1000 milliLiter(s) (40 mL/Hr) IV Continuous <Continuous>    MEDICATIONS  (PRN):      PHYSICAL EXAM:    Vital Signs Last 24 Hrs  T(C): 35.9 (05 Feb 2018 11:56), Max: 36.2 (04 Feb 2018 16:06)  T(F): 96.6 (05 Feb 2018 11:56), Max: 97.2 (04 Feb 2018 16:06)  HR: 63 (05 Feb 2018 11:56) (59 - 73)  BP: 135/71 (05 Feb 2018 11:56) (108/60 - 172/78)  BP(mean): --  RR: 17 (05 Feb 2018 11:56) (12 - 17)  SpO2: 96% (05 Feb 2018 04:44) (95% - 96%)    General: alert  oriented x _3___ short tempered- did not want to talk                  cachexia      HEENT:  dry mouth    Lungs: tachypnea/labored breathing  -when OOB ambulating    CV: normal     GI: normal                  constipation  ldenies     : normal     MSK weakness  edema             ambulatory    Skin: normal    no rash    LABS:                        8.4    2.9   )-----------( 153      ( 05 Feb 2018 07:23 )             25.0     02-05    139  |  101  |  10.0  ----------------------------<  65<L>  3.1<L>   |  16.0<L>  |  1.31<H>    Ca    8.9      05 Feb 2018 07:23  Phos  2.1     02-04  Mg     1.7     02-04    I&O's Summary    04 Feb 2018 07:01  -  05 Feb 2018 07:00  --------------------------------------------------------  IN: 1160 mL / OUT: 2 mL / NET: 1158 mL    RADIOLOGY & ADDITIONAL STUDIES:    ADVANCE DIRECTIVES: Will not establish a HCP-not open to speaking about Directives  DNR  NO  Completed on:                     SATHISH   NO   Completed on:  Living Will   NO   Completed on:

## 2018-02-05 NOTE — CONSULT NOTE ADULT - ATTENDING COMMENTS
COUNSELING:    Face to face meeting to discuss Advanced Care Planning - Time Spent ______ Minutes.  See goals of care note.    More than 50% time spent in counseling and coordinating care. __25____ Minutes.     Thank you for the opportunity to assist with the care of this patient.   [place] [facility] [phone].

## 2018-02-05 NOTE — PHYSICAL THERAPY INITIAL EVALUATION ADULT - ADDITIONAL COMMENTS
Pt. lives with her 2 daughters in a house, but is alone during the day. Pt. has no stairs to enter and no stairs inside. Pt. does not own DME and was independent prior to admission.

## 2018-02-06 PROCEDURE — 99233 SBSQ HOSP IP/OBS HIGH 50: CPT

## 2018-02-06 PROCEDURE — 99233 SBSQ HOSP IP/OBS HIGH 50: CPT | Mod: GC

## 2018-02-06 PROCEDURE — 99232 SBSQ HOSP IP/OBS MODERATE 35: CPT

## 2018-02-06 RX ORDER — SODIUM CHLORIDE 9 MG/ML
1000 INJECTION, SOLUTION INTRAVENOUS
Qty: 0 | Refills: 0 | Status: DISCONTINUED | OUTPATIENT
Start: 2018-02-06 | End: 2018-02-08

## 2018-02-06 RX ORDER — DRONABINOL 2.5 MG
2.5 CAPSULE ORAL
Qty: 0 | Refills: 0 | Status: DISCONTINUED | OUTPATIENT
Start: 2018-02-06 | End: 2018-02-06

## 2018-02-06 RX ORDER — ALPRAZOLAM 0.25 MG
0.5 TABLET ORAL ONCE
Qty: 0 | Refills: 0 | Status: DISCONTINUED | OUTPATIENT
Start: 2018-02-06 | End: 2018-02-06

## 2018-02-06 RX ADMIN — Medication 10 MILLIGRAM(S): at 17:44

## 2018-02-06 RX ADMIN — PANTOPRAZOLE SODIUM 40 MILLIGRAM(S): 20 TABLET, DELAYED RELEASE ORAL at 17:42

## 2018-02-06 RX ADMIN — ATENOLOL 50 MILLIGRAM(S): 25 TABLET ORAL at 06:19

## 2018-02-06 RX ADMIN — Medication 0.5 MILLIGRAM(S): at 22:25

## 2018-02-06 RX ADMIN — AMLODIPINE BESYLATE 2.5 MILLIGRAM(S): 2.5 TABLET ORAL at 06:19

## 2018-02-06 RX ADMIN — LISINOPRIL 40 MILLIGRAM(S): 2.5 TABLET ORAL at 07:03

## 2018-02-06 RX ADMIN — Medication 10 MILLIGRAM(S): at 07:47

## 2018-02-06 RX ADMIN — Medication 1 MILLIGRAM(S): at 12:35

## 2018-02-06 RX ADMIN — ATORVASTATIN CALCIUM 40 MILLIGRAM(S): 80 TABLET, FILM COATED ORAL at 22:26

## 2018-02-06 RX ADMIN — Medication 2.5 MILLIGRAM(S): at 06:19

## 2018-02-06 RX ADMIN — CLOPIDOGREL BISULFATE 75 MILLIGRAM(S): 75 TABLET, FILM COATED ORAL at 12:35

## 2018-02-06 RX ADMIN — PANTOPRAZOLE SODIUM 40 MILLIGRAM(S): 20 TABLET, DELAYED RELEASE ORAL at 06:20

## 2018-02-06 RX ADMIN — ONDANSETRON 4 MILLIGRAM(S): 8 TABLET, FILM COATED ORAL at 17:42

## 2018-02-06 RX ADMIN — ONDANSETRON 4 MILLIGRAM(S): 8 TABLET, FILM COATED ORAL at 12:35

## 2018-02-06 RX ADMIN — ONDANSETRON 4 MILLIGRAM(S): 8 TABLET, FILM COATED ORAL at 06:23

## 2018-02-06 RX ADMIN — Medication 0.5 MILLIGRAM(S): at 17:43

## 2018-02-06 RX ADMIN — Medication 81 MILLIGRAM(S): at 12:35

## 2018-02-06 RX ADMIN — Medication 2.5 MILLIGRAM(S): at 17:43

## 2018-02-06 RX ADMIN — Medication 10 MILLIGRAM(S): at 12:34

## 2018-02-06 NOTE — PROGRESS NOTE ADULT - ATTENDING COMMENTS
Will f/u with Dr. Luna in am  Awaiting palliative eval - pt plans to f/u for chemoTx after will get better  HTN - atenolol, lisinopril, hydralazine PRN Will f/u with Dr. Su Luna at [facility] - [phone]   Awaiting palliative eval - pt plans to f/u for chemoTx after will get better  HTN - atenolol, lisinopril, hydralazine PRN Discussed with Dr. Su Luna at [facility] - [phone] - st IV uncurable Dx, did not tolerate last chemoTx, possible palliative immunotx if acceptable functional status  Awaiting palliative eval - pt plans to f/u for chemoTx after will get better  HTN - atenolol, lisinopril, hydralazine PRN

## 2018-02-06 NOTE — PROGRESS NOTE ADULT - PROBLEM SELECTOR PLAN 3
IVF hydration with improved prerenal azotemia and around the clock zofran/reglan  - diet as tolerated - advanced, if able to tolerate will DC in am

## 2018-02-06 NOTE — PROGRESS NOTE ADULT - PROBLEM SELECTOR PLAN 2
with metastatic brain dx, oncology Dr. Luna at [facility]  palliative care eval today with metastatic brain dx, oncology Dr. Luna at [facility]  pt is in poor functional status with minimal po intake and not ambulating - need to rescan for progression of Dx - MRI brain, CT chest/abd/pelvis and if progression of dx home hospice would be recommended as discussed with pt's oncologist

## 2018-02-06 NOTE — PROGRESS NOTE ADULT - ATTENDING COMMENTS
COUNSELING:    Face to face meeting to discuss Advanced Care Planning - Time Spent ______ Minutes.  See goals of care note.    More than 50% time spent in counseling and coordinating care. ___20___ Minutes.     Thank you for the opportunity to assist with the care of this patient.   [place] [facility] [phone].

## 2018-02-07 LAB
ANION GAP SERPL CALC-SCNC: 15 MMOL/L — SIGNIFICANT CHANGE UP (ref 5–17)
ANISOCYTOSIS BLD QL: SLIGHT — SIGNIFICANT CHANGE UP
BASOPHILS # BLD AUTO: 0 K/UL — SIGNIFICANT CHANGE UP (ref 0–0.2)
BASOPHILS NFR BLD AUTO: 1 % — SIGNIFICANT CHANGE UP (ref 0–2)
BUN SERPL-MCNC: 4 MG/DL — LOW (ref 8–20)
CALCIUM SERPL-MCNC: 9 MG/DL — SIGNIFICANT CHANGE UP (ref 8.6–10.2)
CHLORIDE SERPL-SCNC: 102 MMOL/L — SIGNIFICANT CHANGE UP (ref 98–107)
CO2 SERPL-SCNC: 23 MMOL/L — SIGNIFICANT CHANGE UP (ref 22–29)
CREAT SERPL-MCNC: 0.95 MG/DL — SIGNIFICANT CHANGE UP (ref 0.5–1.3)
EOSINOPHIL # BLD AUTO: 0.1 K/UL — SIGNIFICANT CHANGE UP (ref 0–0.5)
EOSINOPHIL NFR BLD AUTO: 3 % — SIGNIFICANT CHANGE UP (ref 0–5)
GLUCOSE SERPL-MCNC: 97 MG/DL — SIGNIFICANT CHANGE UP (ref 70–115)
HCT VFR BLD CALC: 22.2 % — LOW (ref 37–47)
HGB BLD-MCNC: 7.6 G/DL — LOW (ref 12–16)
HYPOCHROMIA BLD QL: SLIGHT — SIGNIFICANT CHANGE UP
LYMPHOCYTES # BLD AUTO: 0.8 K/UL — LOW (ref 1–4.8)
LYMPHOCYTES # BLD AUTO: 31 % — SIGNIFICANT CHANGE UP (ref 20–55)
MACROCYTES BLD QL: SLIGHT — SIGNIFICANT CHANGE UP
MCHC RBC-ENTMCNC: 34.1 PG — HIGH (ref 27–31)
MCHC RBC-ENTMCNC: 34.2 G/DL — SIGNIFICANT CHANGE UP (ref 32–36)
MCV RBC AUTO: 99.6 FL — HIGH (ref 81–99)
MONOCYTES # BLD AUTO: 0.2 K/UL — SIGNIFICANT CHANGE UP (ref 0–0.8)
MONOCYTES NFR BLD AUTO: 8 % — SIGNIFICANT CHANGE UP (ref 3–10)
NEUTROPHILS # BLD AUTO: 1.6 K/UL — LOW (ref 1.8–8)
NEUTROPHILS NFR BLD AUTO: 57 % — SIGNIFICANT CHANGE UP (ref 37–73)
PLAT MORPH BLD: ABNORMAL
PLATELET # BLD AUTO: 128 K/UL — LOW (ref 150–400)
POIKILOCYTOSIS BLD QL AUTO: SLIGHT — SIGNIFICANT CHANGE UP
POTASSIUM SERPL-MCNC: 3.1 MMOL/L — LOW (ref 3.5–5.3)
POTASSIUM SERPL-SCNC: 3.1 MMOL/L — LOW (ref 3.5–5.3)
RBC # BLD: 2.23 M/UL — LOW (ref 4.4–5.2)
RBC # FLD: 14 % — SIGNIFICANT CHANGE UP (ref 11–15.6)
RBC BLD AUTO: ABNORMAL
SODIUM SERPL-SCNC: 140 MMOL/L — SIGNIFICANT CHANGE UP (ref 135–145)
WBC # BLD: 2.8 K/UL — LOW (ref 4.8–10.8)
WBC # FLD AUTO: 2.8 K/UL — LOW (ref 4.8–10.8)

## 2018-02-07 PROCEDURE — 70553 MRI BRAIN STEM W/O & W/DYE: CPT | Mod: 26

## 2018-02-07 PROCEDURE — 99233 SBSQ HOSP IP/OBS HIGH 50: CPT | Mod: GC

## 2018-02-07 PROCEDURE — 71260 CT THORAX DX C+: CPT | Mod: 26

## 2018-02-07 PROCEDURE — 99232 SBSQ HOSP IP/OBS MODERATE 35: CPT

## 2018-02-07 PROCEDURE — 74177 CT ABD & PELVIS W/CONTRAST: CPT | Mod: 26

## 2018-02-07 RX ORDER — POTASSIUM CHLORIDE 20 MEQ
40 PACKET (EA) ORAL EVERY 4 HOURS
Qty: 0 | Refills: 0 | Status: COMPLETED | OUTPATIENT
Start: 2018-02-07 | End: 2018-02-08

## 2018-02-07 RX ORDER — HYDRALAZINE HCL 50 MG
25 TABLET ORAL EVERY 8 HOURS
Qty: 0 | Refills: 0 | Status: DISCONTINUED | OUTPATIENT
Start: 2018-02-07 | End: 2018-02-09

## 2018-02-07 RX ADMIN — CLOPIDOGREL BISULFATE 75 MILLIGRAM(S): 75 TABLET, FILM COATED ORAL at 13:13

## 2018-02-07 RX ADMIN — ATORVASTATIN CALCIUM 40 MILLIGRAM(S): 80 TABLET, FILM COATED ORAL at 21:39

## 2018-02-07 RX ADMIN — ONDANSETRON 4 MILLIGRAM(S): 8 TABLET, FILM COATED ORAL at 13:13

## 2018-02-07 RX ADMIN — PANTOPRAZOLE SODIUM 40 MILLIGRAM(S): 20 TABLET, DELAYED RELEASE ORAL at 07:00

## 2018-02-07 RX ADMIN — Medication 25 MILLIGRAM(S): at 13:13

## 2018-02-07 RX ADMIN — Medication 10 MILLIGRAM(S): at 07:01

## 2018-02-07 RX ADMIN — SODIUM CHLORIDE 80 MILLILITER(S): 9 INJECTION, SOLUTION INTRAVENOUS at 13:13

## 2018-02-07 RX ADMIN — SODIUM CHLORIDE 80 MILLILITER(S): 9 INJECTION, SOLUTION INTRAVENOUS at 00:01

## 2018-02-07 RX ADMIN — ONDANSETRON 4 MILLIGRAM(S): 8 TABLET, FILM COATED ORAL at 06:59

## 2018-02-07 RX ADMIN — ATENOLOL 50 MILLIGRAM(S): 25 TABLET ORAL at 06:57

## 2018-02-07 RX ADMIN — Medication 40 MILLIEQUIVALENT(S): at 19:53

## 2018-02-07 RX ADMIN — AMLODIPINE BESYLATE 2.5 MILLIGRAM(S): 2.5 TABLET ORAL at 06:57

## 2018-02-07 RX ADMIN — Medication 2.5 MILLIGRAM(S): at 06:59

## 2018-02-07 RX ADMIN — ONDANSETRON 4 MILLIGRAM(S): 8 TABLET, FILM COATED ORAL at 19:52

## 2018-02-07 RX ADMIN — PANTOPRAZOLE SODIUM 40 MILLIGRAM(S): 20 TABLET, DELAYED RELEASE ORAL at 19:52

## 2018-02-07 RX ADMIN — Medication 2.5 MILLIGRAM(S): at 19:53

## 2018-02-07 RX ADMIN — Medication 10 MILLIGRAM(S): at 19:52

## 2018-02-07 RX ADMIN — Medication 0.5 MILLIGRAM(S): at 07:00

## 2018-02-07 RX ADMIN — Medication 1 MILLIGRAM(S): at 13:13

## 2018-02-07 RX ADMIN — Medication 81 MILLIGRAM(S): at 13:13

## 2018-02-07 RX ADMIN — Medication 10 MILLIGRAM(S): at 13:13

## 2018-02-07 RX ADMIN — LISINOPRIL 40 MILLIGRAM(S): 2.5 TABLET ORAL at 06:59

## 2018-02-07 RX ADMIN — ONDANSETRON 4 MILLIGRAM(S): 8 TABLET, FILM COATED ORAL at 00:01

## 2018-02-07 NOTE — PROGRESS NOTE ADULT - ATTENDING COMMENTS
Discussed with Dr. Su Luna at [facility] - [phone] - st IV uncurable Dx, did not tolerate last chemoTx, possible palliative immunotx if acceptable functional status  May need to consider comfort care - pt plans to f/u for chemoTx after will get better  HTN - atenolol, lisinopril, norvasc, hydralazine PRN

## 2018-02-07 NOTE — PROGRESS NOTE ADULT - PROBLEM SELECTOR PLAN 2
with metastatic brain dx, oncology Dr. Luna at [facility]  pt is in poor functional status with minimal po intake and not ambulating - need to rescan for progression of Dx - MRI brain, CT chest/abd/pelvis and if progression of dx home hospice would be recommended as discussed with pt's oncologist

## 2018-02-07 NOTE — PROGRESS NOTE ADULT - PROBLEM SELECTOR PLAN 3
IVF hydration with improved prerenal azotemia and around the clock zofran/reglan  - diet as tolerated - advanced, if able to tolerate will DC

## 2018-02-07 NOTE — GOALS OF CARE CONVERSATION - PERSONAL ADVANCE DIRECTIVE - CONVERSATION DETAILS
Mtg with Pt and daughter to review Plan of Care.  Pt is awake alert and lethargic.  Pt had just has PT was oob for a short time with support.  Daughter is still @ home with her mother.  HCP was reviewed neither Pt not daughter wished to continue this conversation.  As per daughter Pt is scheduled for an MRI today.  Pt denies pain or discomfort.  Palliative will follow as needed.

## 2018-02-07 NOTE — CHART NOTE - NSCHARTNOTEFT_GEN_A_CORE
Source: Patient [x ]  Family [ ]   other [ ]    Current Diet: Regular with ensure clears TID  nausea improved as per pt    PO intake:  < 50% [x ]   50-75%  [ ]   %  [ ]  other :    Source for PO intake [x ] Patient [ ] family [ ] chart [ ] staff [ ] other    Enteral /Parenteral Nutrition:     Current Weight:     % Weight Change     Pertinent Medications: MEDICATIONS  (STANDING):  amLODIPine   Tablet 2.5 milliGRAM(s) Oral daily  aspirin enteric coated 81 milliGRAM(s) Oral daily  ATENolol  Tablet 50 milliGRAM(s) Oral daily  atorvastatin 40 milliGRAM(s) Oral at bedtime  clopidogrel Tablet 75 milliGRAM(s) Oral daily  dronabinol 2.5 milliGRAM(s) Oral two times a day  folic acid 1 milliGRAM(s) Oral daily  lisinopril 40 milliGRAM(s) Oral daily  LORazepam    Concentrate 0.5 milliGRAM(s) SubLingual every 12 hours  metoclopramide Injectable 10 milliGRAM(s) IV Push <User Schedule>  multiple electrolytes Injection Type 1 1000 milliLiter(s) (80 mL/Hr) IV Continuous <Continuous>  ondansetron Injectable 4 milliGRAM(s) IV Push every 6 hours  pantoprazole  Injectable 40 milliGRAM(s) IV Push two times a day    MEDICATIONS  (PRN):  LORazepam    Concentrate 0.5 milliGRAM(s) SubLingual every 6 hours PRN Nausea and/or Vomiting    Pertinent Labs:         Skin:     Nutrition focused physical exam conducted - found signs of malnutrition [ ]absent [ ]present    Subcutaneous fat loss: [ ] Orbital fat pads region, [ ]Buccal fat region, [ ]Triceps region,  [ ]Ribs region    Muscle wasting: [ ]Temples region, [ ]Clavicle region, [ ]Shoulder region, [ ]Scapula region, [ ]Interosseous region,  [ ]thigh region, [ ]Calf region    Estimated Needs:   [x ] no change since previous assessment  [ ] recalculated:     Current Nutrition Diagnosis:   Pt with severe chronic malnutrition related to inadequate energy intake in setting of lung CA, UTI as evidenced by <75% intake >1 mo, 54# weight loss (31%) in 7 mo. Pt with improved nausea on fluids.  Diet upgraded to regular low fat with Ensure clears.  Food encouraged to be brought in from home by family.     Recommendations: Continue Ensure clears. provide encouragement    Monitoring and Evaluation:   [x ] PO intake [x ] Tolerance to diet prescription [X] Weights  [X] Follow up per protocol [X] Labs:

## 2018-02-08 DIAGNOSIS — D61.810 ANTINEOPLASTIC CHEMOTHERAPY INDUCED PANCYTOPENIA: ICD-10-CM

## 2018-02-08 LAB
ANION GAP SERPL CALC-SCNC: 13 MMOL/L — SIGNIFICANT CHANGE UP (ref 5–17)
BLD GP AB SCN SERPL QL: SIGNIFICANT CHANGE UP
BUN SERPL-MCNC: 3 MG/DL — LOW (ref 8–20)
CALCIUM SERPL-MCNC: 8.9 MG/DL — SIGNIFICANT CHANGE UP (ref 8.6–10.2)
CHLORIDE SERPL-SCNC: 107 MMOL/L — SIGNIFICANT CHANGE UP (ref 98–107)
CO2 SERPL-SCNC: 24 MMOL/L — SIGNIFICANT CHANGE UP (ref 22–29)
CREAT SERPL-MCNC: 0.94 MG/DL — SIGNIFICANT CHANGE UP (ref 0.5–1.3)
GLUCOSE SERPL-MCNC: 74 MG/DL — SIGNIFICANT CHANGE UP (ref 70–115)
HCT VFR BLD CALC: 22.4 % — LOW (ref 37–47)
HGB BLD-MCNC: 7.5 G/DL — LOW (ref 12–16)
MCHC RBC-ENTMCNC: 33.2 PG — HIGH (ref 27–31)
MCHC RBC-ENTMCNC: 33.5 G/DL — SIGNIFICANT CHANGE UP (ref 32–36)
MCV RBC AUTO: 99.1 FL — HIGH (ref 81–99)
PLATELET # BLD AUTO: 132 K/UL — LOW (ref 150–400)
POTASSIUM SERPL-MCNC: 4 MMOL/L — SIGNIFICANT CHANGE UP (ref 3.5–5.3)
POTASSIUM SERPL-SCNC: 4 MMOL/L — SIGNIFICANT CHANGE UP (ref 3.5–5.3)
RBC # BLD: 2.26 M/UL — LOW (ref 4.4–5.2)
RBC # FLD: 14 % — SIGNIFICANT CHANGE UP (ref 11–15.6)
SODIUM SERPL-SCNC: 144 MMOL/L — SIGNIFICANT CHANGE UP (ref 135–145)
TYPE + AB SCN PNL BLD: SIGNIFICANT CHANGE UP
WBC # BLD: 3.3 K/UL — LOW (ref 4.8–10.8)
WBC # FLD AUTO: 3.3 K/UL — LOW (ref 4.8–10.8)

## 2018-02-08 PROCEDURE — 99232 SBSQ HOSP IP/OBS MODERATE 35: CPT

## 2018-02-08 PROCEDURE — 99233 SBSQ HOSP IP/OBS HIGH 50: CPT | Mod: GC

## 2018-02-08 PROCEDURE — 99233 SBSQ HOSP IP/OBS HIGH 50: CPT

## 2018-02-08 RX ORDER — IPRATROPIUM/ALBUTEROL SULFATE 18-103MCG
3 AEROSOL WITH ADAPTER (GRAM) INHALATION ONCE
Qty: 0 | Refills: 0 | Status: COMPLETED | OUTPATIENT
Start: 2018-02-08 | End: 2018-02-08

## 2018-02-08 RX ORDER — METOCLOPRAMIDE HCL 10 MG
10 TABLET ORAL
Qty: 0 | Refills: 0 | Status: DISCONTINUED | OUTPATIENT
Start: 2018-02-08 | End: 2018-02-09

## 2018-02-08 RX ORDER — ONDANSETRON 8 MG/1
6 TABLET, FILM COATED ORAL EVERY 6 HOURS
Qty: 0 | Refills: 0 | Status: COMPLETED | OUTPATIENT
Start: 2018-02-08 | End: 2018-02-08

## 2018-02-08 RX ORDER — ACETYLCYSTEINE 200 MG/ML
3 VIAL (ML) MISCELLANEOUS ONCE
Qty: 0 | Refills: 0 | Status: COMPLETED | OUTPATIENT
Start: 2018-02-08 | End: 2018-02-08

## 2018-02-08 RX ADMIN — Medication 10 MILLIGRAM(S): at 12:08

## 2018-02-08 RX ADMIN — ONDANSETRON 6 MILLIGRAM(S): 8 TABLET, FILM COATED ORAL at 12:13

## 2018-02-08 RX ADMIN — Medication 10 MILLIGRAM(S): at 17:07

## 2018-02-08 RX ADMIN — ONDANSETRON 4 MILLIGRAM(S): 8 TABLET, FILM COATED ORAL at 02:21

## 2018-02-08 RX ADMIN — AMLODIPINE BESYLATE 2.5 MILLIGRAM(S): 2.5 TABLET ORAL at 05:51

## 2018-02-08 RX ADMIN — Medication 600 MILLIGRAM(S): at 18:23

## 2018-02-08 RX ADMIN — Medication 0.5 MILLIGRAM(S): at 05:53

## 2018-02-08 RX ADMIN — Medication 81 MILLIGRAM(S): at 12:09

## 2018-02-08 RX ADMIN — PANTOPRAZOLE SODIUM 40 MILLIGRAM(S): 20 TABLET, DELAYED RELEASE ORAL at 05:52

## 2018-02-08 RX ADMIN — Medication 40 MILLIEQUIVALENT(S): at 05:51

## 2018-02-08 RX ADMIN — PANTOPRAZOLE SODIUM 40 MILLIGRAM(S): 20 TABLET, DELAYED RELEASE ORAL at 17:07

## 2018-02-08 RX ADMIN — LISINOPRIL 40 MILLIGRAM(S): 2.5 TABLET ORAL at 05:51

## 2018-02-08 RX ADMIN — Medication 10 MILLIGRAM(S): at 22:02

## 2018-02-08 RX ADMIN — Medication 40 MILLIEQUIVALENT(S): at 00:01

## 2018-02-08 RX ADMIN — Medication 0.5 MILLIGRAM(S): at 17:07

## 2018-02-08 RX ADMIN — ATORVASTATIN CALCIUM 40 MILLIGRAM(S): 80 TABLET, FILM COATED ORAL at 22:03

## 2018-02-08 RX ADMIN — Medication 3 MILLILITER(S): at 16:36

## 2018-02-08 RX ADMIN — SODIUM CHLORIDE 80 MILLILITER(S): 9 INJECTION, SOLUTION INTRAVENOUS at 02:22

## 2018-02-08 RX ADMIN — CLOPIDOGREL BISULFATE 75 MILLIGRAM(S): 75 TABLET, FILM COATED ORAL at 12:08

## 2018-02-08 RX ADMIN — Medication 3 MILLILITER(S): at 16:35

## 2018-02-08 RX ADMIN — ATENOLOL 50 MILLIGRAM(S): 25 TABLET ORAL at 05:51

## 2018-02-08 RX ADMIN — Medication 1 MILLIGRAM(S): at 12:09

## 2018-02-08 RX ADMIN — Medication 2.5 MILLIGRAM(S): at 05:52

## 2018-02-08 RX ADMIN — Medication 2.5 MILLIGRAM(S): at 17:07

## 2018-02-08 NOTE — PROGRESS NOTE ADULT - PROBLEM SELECTOR PROBLEM 3
Pancytopenia due to chemotherapy
Intractable vomiting with nausea, unspecified vomiting type

## 2018-02-08 NOTE — PROGRESS NOTE ADULT - ATTENDING COMMENTS
Discussed with Dr. Su Luna at [facility] - [phone] - st IV uncurable Dx, did not tolerate last chemoTx, possible palliative immunotx if acceptable functional status  May need to consider comfort care - pt plans to f/u for chemoTx after will get better  HTN - atenolol, lisinopril, norvasc, hydralazine PRN

## 2018-02-08 NOTE — PROGRESS NOTE ADULT - ATTENDING COMMENTS
COUNSELING:    Face to face meeting to discuss Advanced Care Planning - Time Spent ______ Minutes.  See goals of care note.    More than 50% time spent in counseling and coordinating care. __20____ Minutes.     Thank you for the opportunity to assist with the care of this patient.   [place] [facility] [phone].

## 2018-02-08 NOTE — PROGRESS NOTE ADULT - ASSESSMENT
61yo F with Metastatic Lung Ca> abdominal and Brain  Anemia <H/H 7.5/22.4 I Unit RBC's ordered  Upper Chest Congestion-loose cough   Mucinex Q12  ?CXR Dr. Luke's decision  Anxiety/Depression- Will ask patient about Cymbalta would she like to give it a try.   Ativan used for mood and N/V  Edson Garcia is visiting Patient  Jackie JASMINE also visiting daily to lend support        UTI resolving  Disease Progression  Oncology does not want to pursue ANY OTHER CHEMOTHERAPY TREATMENT
61 y/o F, with hx of lung cancer that spread to her brain, HTN, and 5 stents in place, presents to the ED c/o nausea and emesis, onset 6 days ago.  Pt states that she was seen in the hospital on Monday and was diagnosed with a UTI BASED ON U/A NO CX DONE  AT THE TIME   Pt was admitted last pm increased weakness  BLOOD CX AND URINE CX  NEG SO FAR   ON ROCEPHIN  WILL DISCONTINUE   NO OBVIOUS SOURCE   IF FEVERS PERSIST WILL SUGGEST CT CHEST ABD AND PELVIS    ? GI EVAL FOR PERSISTENT NAUSEA  RVP PANEL NEG  WILL FOLLOW UP WITH FURTHER RECOMMENDATIONS
61 y/o F, with hx of lung cancer that spread to her brain, HTN, and 5 stents in place, presents to the ED c/o nausea and emesis, onset 6 days ago.  Pt states that she was seen in the hospital on Monday and was diagnosed with a UTI BASED ON U/A NO CX DONE  AT THE TIME   Pt was admitted last pm increased weakness  BLOOD CX AND URINE CX PENDING  ON ROCEPHIN  WILL CONTINUE FOR NOW  WILL AWAIT RESULTS OF CX  RVP PANEL NEG  WILL FOLLOW UP WITH FURTHER RECOMMENDATIONS
61 y/o F, with hx of lung cancer that spread to her brain, HTN, and 5 stents in place, presents to the ED c/o nausea and emesis, onset 6 days ago.  Pt states that she was seen in the hospital on Monday and was diagnosed with a UTI BASED ON U/A NO CX DONE  AT THE TIME   Pt was admitted last pm increased weakness.
61 y/o F, with hx of lung cancer that spread to her brain, HTN, and 5 stents in place, presents to the ED c/o nausea and emesis, onset 6 days ago.  Pt states that she was seen in the hospital on Monday and was diagnosed with a UTI BASED ON U/A NO CX DONE  AT THE TIME   Pt was admitted last pm increased weakness.  2/3 GI input appreciated for severe nausea, iv regland, iv zofran, iv protonix, clear liquid diet with ensure clear. Palliative care consulted.
61 y/o F, with hx of lung cancer that spread to her brain, HTN, and 5 stents in place, presents to the ED c/o nausea and emesis, onset 6 days ago.  Pt states that she was seen in the hospital on Monday and was diagnosed with a UTI BASED ON U/A NO CX DONE  AT THE TIME   Pt was admitted last pm increased weakness.  2/3 GI input appreciated for severe nausea, iv regland, iv zofran, iv protonix, clear liquid diet with ensure clear. Palliative care consulted.
63 y/o F, with hx of lung cancer that spread to her brain, HTN, and 5 stents in place, presents to the ED c/o nausea and emesis, onset 6 days ago.  Pt states that she was seen in the hospital on Monday and was diagnosed with a UTI BASED ON U/A NO CX DONE  AT THE TIME   Pt was admitted last pm increased weakness
63 y/o F, with hx of lung cancer that spread to her brain, HTN, and 5 stents in place, presents to the ED c/o nausea and emesis, onset 6 days ago.  Pt states that she was seen in the hospital on Monday and was diagnosed with a UTI BASED ON U/A NO CX DONE  AT THE TIME   Pt was admitted last pm increased weakness  BLOOD CX AND URINE CX  NEG SO FAR    OFF ALL ABX  NON TOXIC  WILL FOLLOW UP AS NEEDED PLEASE CALL IF QUESTIONS
63 y/o F, with hx of lung cancer that spread to her brain, HTN, and 5 stents in place, presents to the ED c/o nausea and emesis, onset 6 days ago.  Pt states that she was seen in the hospital on Monday and was diagnosed with a UTI BASED ON U/A NO CX DONE  AT THE TIME   Pt was admitted last pm increased weakness.  2/3 GI input appreciated for severe nausea, iv regland, iv zofran, iv protonix, clear liquid diet with ensure clear. Palliative care consulted.
63yo F with Metastatic Lung Cancer to Brain  Newly diagnosed-overwhelming patient  Scheduled for an MRI today to eval for disease progression    Nausea and Vomiting severe and persistent with addition of Reglan ATC    Will add Ativan 0.5mg SL Q12 and Q6 prn for N/V first dose stat.      Spoke to Patient with daughters at bedside, about possibly benefiting from adding Cymbalta to her symptom management  To help with coping with current hospitalization and decision making. She will think about it but refusing  help with mood at this time.

## 2018-02-08 NOTE — PROGRESS NOTE ADULT - NSHPATTENDINGPLANDISCUSS_GEN_ALL_CORE
RN and pt
RN and pt, family at bedside
RN and pt, palliative NP
RN and pt, palliative NP
RN and pt, family at bedside

## 2018-02-08 NOTE — PROGRESS NOTE ADULT - PROBLEM SELECTOR PLAN 2
with metastatic brain dx, oncology Dr. Luna at [facility]  pt is in poor functional status with minimal po intake and not ambulating - need to rescan for progression of Dx - MRI brain, CT chest/abd/pelvis and if progression of dx home hospice would be recommended as discussed with pt's oncologist   - stable imaging compared to studies from 1/17

## 2018-02-08 NOTE — PROGRESS NOTE ADULT - PROBLEM SELECTOR PLAN 3
IVF hydration with improved prerenal azotemia and around the clock zofran/reglan  - diet as tolerated - advanced, DC IVF

## 2018-02-08 NOTE — PROGRESS NOTE ADULT - PROBLEM SELECTOR PROBLEM 1
Intractable vomiting with nausea, unspecified vomiting type
Malignant neoplasm of lung, unspecified laterality, unspecified part of lung
Acute cystitis without hematuria
Intractable vomiting with nausea, unspecified vomiting type

## 2018-02-08 NOTE — PROGRESS NOTE ADULT - SUBJECTIVE AND OBJECTIVE BOX
INTERVAL HPI/OVERNIGHT EVENTS: 61yo Female Patient with Metastatic Lung Cancer post RT and Chemotherapy treatments end of 2017. She was admitted dehydrated, because of persistent N/V found to have  a UTI. She is depressed withdrawn anxious, having persistent nausea although it is better since Ativan is being used ATC and prn.  CC: Today she has a congested cough, is weak and tired. Will be transfused one unit RBC's for low H/H 7.5/22.5 platelets <132  nikos Jimenez visited patient yesterday, and will continue to see the patient-during hospitalization.    62y old  Female who presents with a chief complaint of dehydration, vomiting, UTI (31 Jan 2018 16:19)    PAST MEDICAL & SURGICAL HISTORY:  Lung cancer  Hypertension  S/P cardiac catheterization: 5 cardiac stents  No significant past surgical history    Present Symptoms:     Dyspnea:2   Nausea/Vomiting: Yes   Anxiety:  Yes   Depression: Yes   Fatigue: Yes   Loss of appetite: Yes     Pain: denies            Character-            Duration-            Effect-            Factors-            Frequency-            Location-            Severity-    Review of Systems: Reviewed                     All others negative    MEDICATIONS  (STANDING):  amLODIPine   Tablet 2.5 milliGRAM(s) Oral daily  aspirin enteric coated 81 milliGRAM(s) Oral daily  ATENolol  Tablet 50 milliGRAM(s) Oral daily  atorvastatin 40 milliGRAM(s) Oral at bedtime  clopidogrel Tablet 75 milliGRAM(s) Oral daily  dronabinol 2.5 milliGRAM(s) Oral two times a day  folic acid 1 milliGRAM(s) Oral daily  guaiFENesin  milliGRAM(s) Oral every 12 hours  lisinopril 40 milliGRAM(s) Oral daily  LORazepam    Concentrate 0.5 milliGRAM(s) SubLingual every 12 hours  metoclopramide 10 milliGRAM(s) Oral Before meals and at bedtime  pantoprazole  Injectable 40 milliGRAM(s) IV Push two times a day    MEDICATIONS  (PRN):  hydrALAZINE 25 milliGRAM(s) Oral every 8 hours PRN Systolic blood pressure >150  LORazepam    Concentrate 0.5 milliGRAM(s) SubLingual every 6 hours PRN Nausea and/or Vomiting      PHYSICAL EXAM:    Vital Signs Last 24 Hrs  T(C): 37.2 (08 Feb 2018 15:32), Max: 37.2 (08 Feb 2018 15:32)  T(F): 99 (08 Feb 2018 15:32), Max: 99 (08 Feb 2018 15:32)  HR: 75 (08 Feb 2018 15:32) (68 - 75)  BP: 141/81 (08 Feb 2018 15:32) (106/60 - 162/84)  BP(mean): --  RR: 18 (08 Feb 2018 15:32) (18 - 19)  SpO2: 96% (08 Feb 2018 05:25) (95% - 98%)    General: alert  oriented x __3__ lethargic                   cachexia      HEENT dry mouth      Lungs: comfortable tachypnea/labored breathing  upper airway rhonchi and congestion    CV: normal     GI: ndistended  tender                constipation  last BM:     : normal      MSK: weakness             am  bedbound/     no rash    LABS:                        7.5    3.3   )-----------( 132      ( 08 Feb 2018 06:47 )             22.4     02-08    144  |  107  |  3.0<L>  ----------------------------<  74  4.0   |  24.0  |  0.94    Ca    8.9      08 Feb 2018 06:47    I&O's Summary    07 Feb 2018 07:01  -  08 Feb 2018 07:00  --------------------------------------------------------  IN: 960 mL / OUT: 0 mL / NET: 960 mL    RADIOLOGY & ADDITIONAL STUDIES:  < from: CT Abdomen and Pelvis w/ IV Cont (02.07.18 @ 10:26) >    IMPRESSION:     Left lung lesion and mild adenopathy, grossly stable. Spiculated   indeterminate opacity right apex, stable.    2.2 cm right adrenal nodule, stable from 1/31, new from 2015, metastasis   in the differential.    Sclerotic lesion L1 vertebra posterior cortex and left iliac bone, stable   from 1/31 but new from 2015, possibly metastatic.    Indeterminate right renal lesion as above. Other incidental findings as   above.    < end of copied text >      ADVANCE DIRECTIVES:   DNR  NO  Completed on:                     MOLST  NO   Completed on:  Living Will   NO   Completed on:
CC: dehydration, vomiting, UTI (31 Jan 2018 16:19)    HPI: 62 y.o. Female with PMHx of HTN, CAD s/p PCI with 5 stents (2008) on plavix,, metastatic lung CA with brain mets s/p radiation therapy (last 8/17) and chemotherapy (11/17), presents to the ED c/o nausea and emesis, onset 6 days ago.  Pt states that she was seen in the [facility] on Monday and was diagnosed with a UTI.  Pt was given abx and Zofran to treat her sx.  She notes that the medicine has not helped her sx.  Pt notes 3 episodes of emesis last night.  Pt has not eaten or drank fluids in 6 days.  Lung cancer has been treated with chemotherapy, but she has not been for a treatment since September.  Notes that she was told her scans look better and the cancer is shrinking.  Last MRI of the brain was 2 months ago.  Denies fever, chills, pain, or cough.   Pt has taken steroids in the past, but does not like the side effects she feels from them. Treated at the [facility] for her cancer. Pt is  allergic to codeine and penicillin. (31 Jan 2018 16:19)    INTERVAL HPI/OVERNIGHT EVENTS: pt feels less nauseous, c/o extreme weakness, no appetite, tolerates po  Other ROS reviewed and neg     Vital Signs Last 24 Hrs  T(C): 37.2 (06 Feb 2018 05:06), Max: 37.2 (06 Feb 2018 05:06)  T(F): 99 (06 Feb 2018 05:06), Max: 99 (06 Feb 2018 05:06)  HR: 70 (06 Feb 2018 05:06) (64 - 70)  BP: 123/71 (06 Feb 2018 05:06) (123/71 - 145/71)  RR: 18 (06 Feb 2018 05:06) (16 - 18)  SpO2: 97% (05 Feb 2018 21:49) (97% - 97%)                        8.4    2.9   )-----------( 153      ( 05 Feb 2018 07:23 )             25.0     05 Feb 2018 07:23    139    |  101    |  10.0   ----------------------------<  65     3.1     |  16.0   |  1.31     Ca    8.9        05 Feb 2018 07:23      MEDICATIONS  (STANDING):  amLODIPine   Tablet 2.5 milliGRAM(s) Oral daily  aspirin enteric coated 81 milliGRAM(s) Oral daily  ATENolol  Tablet 50 milliGRAM(s) Oral daily  atorvastatin 40 milliGRAM(s) Oral at bedtime  clopidogrel Tablet 75 milliGRAM(s) Oral daily  dronabinol 2.5 milliGRAM(s) Oral two times a day  folic acid 1 milliGRAM(s) Oral daily  lisinopril 40 milliGRAM(s) Oral daily  metoclopramide Injectable 10 milliGRAM(s) IV Push <User Schedule>  ondansetron Injectable 4 milliGRAM(s) IV Push every 6 hours  pantoprazole  Injectable 40 milliGRAM(s) IV Push two times a day  sodium chloride 0.9%. 1000 milliLiter(s) (40 mL/Hr) IV Continuous <Continuous>    RADIOLOGY & ADDITIONAL TESTS: personally visualized    PHYSICAL EXAM:    General: debilitated fragile female in no acute distress  Eyes: PERRLA, EOMI; conjunctiva and sclera clear, pale  Head: Normocephalic; atraumatic  ENMT: No nasal discharge; airway clear, dry mucosal membranes  Neck: Supple; non tender; no masses  Respiratory: No wheezes, rales or rhonchi, decreased BS at bases  Cardiovascular: Regular rate and rhythm. S1 and S2  Gastrointestinal: Soft non-tender non-distended; Normal bowel sounds  Genitourinary: No costovertebral angle tenderness  Extremities: + range of motion, No clubbing, cyanosis or edema  Vascular: Peripheral pulses palpable 2+ bilaterally  Neurological: Alert and oriented x 3  Skin: Warm and dry. Pale  Musculoskeletal: Normal tone, without deformities  Psychiatric: Cooperative
CC: dehydration, vomiting, UTI (31 Jan 2018 16:19)    HPI: 62 y.o. Female with PMHx of HTN, CAD s/p PCI with 5 stents (2008) on plavix,, metastatic lung CA with brain mets s/p radiation therapy (last 8/17) and chemotherapy (11/17), presents to the ED c/o nausea and emesis, onset 6 days ago.  Pt states that she was seen in the [facility] on Monday and was diagnosed with a UTI.  Pt was given abx and Zofran to treat her sx.  She notes that the medicine has not helped her sx.  Pt notes 3 episodes of emesis last night.  Pt has not eaten or drank fluids in 6 days.  Lung cancer has been treated with chemotherapy, but she has not been for a treatment since September.  Notes that she was told her scans look better and the cancer is shrinking.  Last MRI of the brain was 2 months ago.  Denies fever, chills, pain, or cough.   Pt has taken steroids in the past, but does not like the side effects she feels from them. Treated at the [facility] for her cancer. Pt is  allergic to codeine and penicillin. (31 Jan 2018 16:19)    INTERVAL HPI/OVERNIGHT EVENTS: pt feels less nauseous, c/o extreme weakness, no appetite, tolerates po, very weak and unsteady - needs assistance, minimal to no po intake  Other ROS reviewed and neg     Vital Signs Last 24 Hrs  T(C): 36.7 (08 Feb 2018 08:36), Max: 36.9 (08 Feb 2018 05:25)  T(F): 98 (08 Feb 2018 08:36), Max: 98.4 (08 Feb 2018 05:25)  HR: 72 (08 Feb 2018 08:36) (68 - 75)  BP: 106/60 (08 Feb 2018 08:36) (106/60 - 162/84)  RR: 18 (08 Feb 2018 08:36) (18 - 19)  SpO2: 96% (08 Feb 2018 05:25) (95% - 98%)             Vital Signs Last 24 Hrs  T(C): 36.7 (08 Feb 2018 08:36), Max: 36.9 (08 Feb 2018 05:25)  T(F): 98 (08 Feb 2018 08:36), Max: 98.4 (08 Feb 2018 05:25)  HR: 72 (08 Feb 2018 08:36) (68 - 75)  BP: 106/60 (08 Feb 2018 08:36) (106/60 - 162/84)  RR: 18 (08 Feb 2018 08:36) (18 - 19)  SpO2: 96% (08 Feb 2018 05:25) (95% - 98%)                       7.5    3.3   )-----------( 132      ( 08 Feb 2018 06:47 )             22.4     08 Feb 2018 06:47    144    |  107    |  3.0    ----------------------------<  74     4.0     |  24.0   |  0.94     Ca    8.9        08 Feb 2018 06:47    MEDICATIONS  (STANDING):  amLODIPine   Tablet 2.5 milliGRAM(s) Oral daily  aspirin enteric coated 81 milliGRAM(s) Oral daily  ATENolol  Tablet 50 milliGRAM(s) Oral daily  atorvastatin 40 milliGRAM(s) Oral at bedtime  clopidogrel Tablet 75 milliGRAM(s) Oral daily  dronabinol 2.5 milliGRAM(s) Oral two times a day  folic acid 1 milliGRAM(s) Oral daily  lisinopril 40 milliGRAM(s) Oral daily  LORazepam    Concentrate 0.5 milliGRAM(s) SubLingual every 12 hours  metoclopramide 10 milliGRAM(s) Oral Before meals and at bedtime  ondansetron Injectable 6 milliGRAM(s) IV Push every 6 hours  pantoprazole  Injectable 40 milliGRAM(s) IV Push two times a day    MEDICATIONS  (PRN):  hydrALAZINE 25 milliGRAM(s) Oral every 8 hours PRN Systolic blood pressure >150  LORazepam    Concentrate 0.5 milliGRAM(s) SubLingual every 6 hours PRN Nausea and/or Vomiting    RADIOLOGY & ADDITIONAL TESTS: personally visualized    PHYSICAL EXAM:    General: debilitated fragile female in no acute distress  Eyes: PERRLA, EOMI; conjunctiva and sclera clear, pale  Head: Normocephalic; atraumatic  ENMT: No nasal discharge; airway clear, dry mucosal membranes  Neck: Supple; non tender; no masses  Respiratory: No wheezes, rales or rhonchi, decreased BS at bases  Cardiovascular: Regular rate and rhythm. S1 and S2  Gastrointestinal: Soft non-tender non-distended; Normal bowel sounds  Genitourinary: No costovertebral angle tenderness  Extremities: + range of motion, No clubbing, cyanosis or edema  Vascular: Peripheral pulses palpable 2+ bilaterally  Neurological: Alert and oriented x 3  Skin: Warm and dry. Pale  Musculoskeletal: Normal tone, without deformities  Psychiatric: Cooperative
CC: dehydration, vomiting, UTI (31 Jan 2018 16:19)    HPI: 62 y.o. Female with PMHx of HTN, CAD s/p PCI with 5 stents (2008) on plavix,, metastatic lung CA with brain mets s/p radiation therapy (last 8/17) and chemotherapy (11/17), presents to the ED c/o nausea and emesis, onset 6 days ago.  Pt states that she was seen in the [facility] on Monday and was diagnosed with a UTI.  Pt was given abx and Zofran to treat her sx.  She notes that the medicine has not helped her sx.  Pt notes 3 episodes of emesis last night.  Pt has not eaten or drank fluids in 6 days.  Lung cancer has been treated with chemotherapy, but she has not been for a treatment since September.  Notes that she was told her scans look better and the cancer is shrinking.  Last MRI of the brain was 2 months ago.  Denies fever, chills, pain, or cough.   Pt has taken steroids in the past, but does not like the side effects she feels from them. Treated at the [facility] for her cancer. Pt is  allergic to codeine and penicillin. (31 Jan 2018 16:19)    INTERVAL HPI/OVERNIGHT EVENTS: pt feels less nauseous, c/o extreme weakness, no appetite  Other ROS reviewed and neg     Vital Signs Last 24 Hrs  T(C): 35.9 (05 Feb 2018 11:56), Max: 36.2 (04 Feb 2018 16:06)  T(F): 96.6 (05 Feb 2018 11:56), Max: 97.2 (04 Feb 2018 16:06)  HR: 63 (05 Feb 2018 11:56) (59 - 73)  BP: 135/71 (05 Feb 2018 11:56) (108/60 - 172/78)  RR: 17 (05 Feb 2018 11:56) (12 - 17)  SpO2: 96% (05 Feb 2018 04:44) (95% - 96%)                       8.4    2.9   )-----------( 153      ( 05 Feb 2018 07:23 )             25.0     05 Feb 2018 07:23    139    |  101    |  10.0   ----------------------------<  65     3.1     |  16.0   |  1.31     Ca    8.9        05 Feb 2018 07:23  Phos  2.1       04 Feb 2018 08:36  Mg     1.7       04 Feb 2018 08:36    MEDICATIONS  (STANDING):  amLODIPine   Tablet 2.5 milliGRAM(s) Oral daily  aspirin enteric coated 81 milliGRAM(s) Oral daily  ATENolol  Tablet 50 milliGRAM(s) Oral daily  atorvastatin 40 milliGRAM(s) Oral at bedtime  clopidogrel Tablet 75 milliGRAM(s) Oral daily  dronabinol 2.5 milliGRAM(s) Oral two times a day  folic acid 1 milliGRAM(s) Oral daily  lisinopril 40 milliGRAM(s) Oral daily  metoclopramide Injectable 10 milliGRAM(s) IV Push <User Schedule>  ondansetron Injectable 4 milliGRAM(s) IV Push every 6 hours  pantoprazole  Injectable 40 milliGRAM(s) IV Push two times a day  sodium chloride 0.9%. 1000 milliLiter(s) (40 mL/Hr) IV Continuous <Continuous>    RADIOLOGY & ADDITIONAL TESTS: personally visualized    PHYSICAL EXAM:    General: debilitated fragile female in no acute distress  Eyes: PERRLA, EOMI; conjunctiva and sclera clear, pale  Head: Normocephalic; atraumatic  ENMT: No nasal discharge; airway clear, dry mucosal membranes  Neck: Supple; non tender; no masses  Respiratory: No wheezes, rales or rhonchi, decreased BS at bases  Cardiovascular: Regular rate and rhythm. S1 and S2  Gastrointestinal: Soft non-tender non-distended; Normal bowel sounds  Genitourinary: No costovertebral angle tenderness  Extremities: + range of motion, No clubbing, cyanosis or edema  Vascular: Peripheral pulses palpable 2+ bilaterally  Neurological: Alert and oriented x 3  Skin: Warm and dry. Pale  Musculoskeletal: Normal tone, without deformities  Psychiatric: Cooperative
CC: dehydration, vomiting, UTI (31 Jan 2018 16:19)    HPI: 62 y.o. Female with PMHx of HTN, CAD s/p PCI with 5 stents (2008) on plavix,, metastatic lung CA with brain mets s/p radiation therapy (last 8/17) and chemotherapy (11/17), presents to the ED c/o nausea and emesis, onset 6 days ago.  Pt states that she was seen in the [facility] on Monday and was diagnosed with a UTI.  Pt was given abx and Zofran to treat her sx.  She notes that the medicine has not helped her sx.  Pt notes 3 episodes of emesis last night.  Pt has not eaten or drank fluids in 6 days.  Lung cancer has been treated with chemotherapy, but she has not been for a treatment since September.  Notes that she was told her scans look better and the cancer is shrinking.  Last MRI of the brain was 2 months ago.  Denies fever, chills, pain, or cough.   Pt has taken steroids in the past, but does not like the side effects she feels from them. Treated at the [facility] for her cancer. Pt is  allergic to codeine and penicillin. (31 Jan 2018 16:19)    INTERVAL HPI/OVERNIGHT EVENTS: pt feels less nauseous, c/o extreme weakness, no appetite, tolerates po, very weak and unsteady - needs assistance, minimal to no po intake  Other ROS reviewed and neg     Vital Signs Last 24 Hrs  T(C): 36.8 (07 Feb 2018 10:54), Max: 37.1 (06 Feb 2018 22:15)  T(F): 98.2 (07 Feb 2018 10:54), Max: 98.8 (06 Feb 2018 22:15)  HR: 72 (07 Feb 2018 10:54) (62 - 72)  BP: 168/98 (07 Feb 2018 10:54) (116/72 - 168/98)  RR: 20 (07 Feb 2018 10:54) (20 - 20)  SpO2: 96% (07 Feb 2018 10:54) (96% - 96%)             LABS Pending    MEDICATIONS  (STANDING):  amLODIPine   Tablet 2.5 milliGRAM(s) Oral daily  aspirin enteric coated 81 milliGRAM(s) Oral daily  ATENolol  Tablet 50 milliGRAM(s) Oral daily  atorvastatin 40 milliGRAM(s) Oral at bedtime  clopidogrel Tablet 75 milliGRAM(s) Oral daily  dronabinol 2.5 milliGRAM(s) Oral two times a day  folic acid 1 milliGRAM(s) Oral daily  lisinopril 40 milliGRAM(s) Oral daily  LORazepam    Concentrate 0.5 milliGRAM(s) SubLingual every 12 hours  metoclopramide Injectable 10 milliGRAM(s) IV Push <User Schedule>  multiple electrolytes Injection Type 1 1000 milliLiter(s) (80 mL/Hr) IV Continuous <Continuous>  ondansetron Injectable 4 milliGRAM(s) IV Push every 6 hours  pantoprazole  Injectable 40 milliGRAM(s) IV Push two times a day    MEDICATIONS  (PRN):  hydrALAZINE 25 milliGRAM(s) Oral every 8 hours PRN Systolic blood pressure >150  LORazepam    Concentrate 0.5 milliGRAM(s) SubLingual every 6 hours PRN Nausea and/or Vomiting    RADIOLOGY & ADDITIONAL TESTS: personally visualized    PHYSICAL EXAM:    General: debilitated fragile female in no acute distress  Eyes: PERRLA, EOMI; conjunctiva and sclera clear, pale  Head: Normocephalic; atraumatic  ENMT: No nasal discharge; airway clear, dry mucosal membranes  Neck: Supple; non tender; no masses  Respiratory: No wheezes, rales or rhonchi, decreased BS at bases  Cardiovascular: Regular rate and rhythm. S1 and S2  Gastrointestinal: Soft non-tender non-distended; Normal bowel sounds  Genitourinary: No costovertebral angle tenderness  Extremities: + range of motion, No clubbing, cyanosis or edema  Vascular: Peripheral pulses palpable 2+ bilaterally  Neurological: Alert and oriented x 3  Skin: Warm and dry. Pale  Musculoskeletal: Normal tone, without deformities  Psychiatric: Cooperative
CC: dehydration, vomiting, UTI (31 Jan 2018 16:19)    HPI: 62 y.o. Female with PMHx of HTN, CAD s/p PCI with 5 stents (2008) on plavix,, metastatic lung CA with brain mets s/p radiation therapy (last 8/17) and chemotherapy (11/17), presents to the ED c/o nausea and emesis, onset 6 days ago.  Pt states that she was seen in the [facility] on Monday and was diagnosed with a UTI.  Pt was given abx and Zofran to treat her sx.  She notes that the medicine has not helped her sx.  Pt notes 3 episodes of emesis last night.  Pt has not eaten or drank fluids in 6 days.  Lung cancer has been treated with chemotherapy, but she has not been for a treatment since September.  Notes that she was told her scans look better and the cancer is shrinking.  Last MRI of the brain was 2 months ago.  Denies fever, chills, pain, or cough.   Pt has taken steroids in the past, but does not like the side effects she feels from them. Treated at the [facility] for her cancer. Pt is  allergic to codeine and penicillin. (31 Jan 2018 16:19)    INTERVAL HPI/OVERNIGHT EVENTS: pt feels less nauseous, c/o extreme weakness, did not eat anything, no appetite  Other ROS reviewed and neg     Vital Signs Last 24 Hrs  T(C): 36.4 (04 Feb 2018 10:28), Max: 36.7 (03 Feb 2018 16:46)  T(F): 97.6 (04 Feb 2018 10:28), Max: 98 (03 Feb 2018 16:46)  HR: 46 (04 Feb 2018 10:28) (46 - 63)  BP: 142/70 (04 Feb 2018 10:28) (122/74 - 156/75)  RR: 12 (04 Feb 2018 10:28) (12 - 18)  SpO2: 100% (04 Feb 2018 10:28) (97% - 100%)  I&O's Detail    03 Feb 2018 07:01  -  04 Feb 2018 07:00  --------------------------------------------------------  IN:    sodium chloride 0.9%.: 960 mL  Total IN: 960 mL    OUT:  Total OUT: 0 mL    Total NET: 960 mL                       8.6    3.1   )-----------( 158      ( 04 Feb 2018 08:36 )             25.3     04 Feb 2018 08:36    137    |  104    |  13.0   ----------------------------<  70     3.5     |  17.0   |  1.43     Ca    9.0        04 Feb 2018 08:36  Phos  2.1       04 Feb 2018 08:36  Mg     1.7       04 Feb 2018 08:36    MEDICATIONS  (STANDING):  amLODIPine   Tablet 2.5 milliGRAM(s) Oral daily  aspirin enteric coated 81 milliGRAM(s) Oral daily  ATENolol  Tablet 50 milliGRAM(s) Oral daily  atorvastatin 40 milliGRAM(s) Oral at bedtime  clopidogrel Tablet 75 milliGRAM(s) Oral daily  dronabinol 2.5 milliGRAM(s) Oral two times a day  folic acid 1 milliGRAM(s) Oral daily  lisinopril 40 milliGRAM(s) Oral daily  metoclopramide Injectable 10 milliGRAM(s) IV Push <User Schedule>  ondansetron Injectable 4 milliGRAM(s) IV Push every 6 hours  pantoprazole  Injectable 40 milliGRAM(s) IV Push two times a day  sodium chloride 0.9%. 1000 milliLiter(s) (40 mL/Hr) IV Continuous <Continuous>    RADIOLOGY & ADDITIONAL TESTS: personally visualized    PHYSICAL EXAM:    General: debilitated fragile female in no acute distress  Eyes: PERRLA, EOMI; conjunctiva and sclera clear, pale  Head: Normocephalic; atraumatic  ENMT: No nasal discharge; airway clear, dry mucosal membranes  Neck: Supple; non tender; no masses  Respiratory: No wheezes, rales or rhonchi, decreased BS at bases  Cardiovascular: Regular rate and rhythm. S1 and S2  Gastrointestinal: Soft non-tender non-distended; Normal bowel sounds  Genitourinary: No costovertebral angle tenderness  Extremities: + range of motion, No clubbing, cyanosis or edema  Vascular: Peripheral pulses palpable 2+ bilaterally  Neurological: Alert and oriented x 3  Skin: Warm and dry. Pale  Musculoskeletal: Normal tone, without deformities  Psychiatric: Cooperative
DOMENIC CEBALLOS     Chief Complaint: Patient is a 62y old  Female who presents with a chief complaint of dehydration, vomiting, UTI (2018 16:19)      PAST MEDICAL & SURGICAL HISTORY:  Lung cancer  Hypertension  S/P cardiac catheterization: 5 cardiac stents  No significant past surgical history      HPI/OVERNIGHT EVENTS: Patient complaing of nausea, clearly cachectic, severe hypokalemia.    MEDICATIONS  (STANDING):  amLODIPine   Tablet 2.5 milliGRAM(s) Oral daily  aspirin enteric coated 81 milliGRAM(s) Oral daily  ATENolol  Tablet 50 milliGRAM(s) Oral daily  atorvastatin 40 milliGRAM(s) Oral at bedtime  cefTRIAXone   IVPB 1 Gram(s) IV Intermittent every 24 hours  clopidogrel Tablet 75 milliGRAM(s) Oral daily  folic acid 1 milliGRAM(s) Oral daily  lisinopril 40 milliGRAM(s) Oral daily  pantoprazole    Tablet 40 milliGRAM(s) Oral before breakfast  potassium chloride    Tablet ER 40 milliEquivalent(s) Oral every 4 hours  sodium chloride 0.9%. 1000 milliLiter(s) (40 mL/Hr) IV Continuous <Continuous>      Vital Signs Last 24 Hrs  T(C): 36.8 (2018 08:38), Max: 36.8 (2018 08:38)  T(F): 98.3 (2018 08:38), Max: 98.3 (2018 08:38)  HR: 54 (2018 08:38) (52 - 64)  BP: 129/66 (2018 08:38) (129/66 - 142/63)  BP(mean): --  RR: 18 (2018 08:38) (18 - 20)  SpO2: 99% (2018 08:38) (98% - 99%)    PHYSICAL EXAM:  Constitutional:  temporal wasting  HEENT: PERRLA, EOMI, Normal Hearing, MMM  Neck: No LAD, No JVD  Back: Normal spine flexure, No CVA tenderness  Respiratory: CTAB Cardiovascular: S1 and S2, RRR, no M/G/R  Gastrointestinal: BS+, soft, NT/ND  Extremities: No peripheral edema  Vascular: 2+ peripheral pulses  Neurological: A/O x 3, no focal deficits  Psychiatric: Normal mood, normal affect  Musculoskeletal: 5/5 strength b/l upper and lower extremities  Skin: No rashes    CAPILLARY BLOOD GLUCOSE    LABS:                        8.0    4.9   )-----------( 153      ( 2018 10:29 )             24.0     02-    137  |  105  |  16.0  ----------------------------<  73  2.9<LL>   |  17.0<L>  |  1.77<H>    Ca    8.7      2018 10:29    TPro  5.6<L>  /  Alb  2.9<L>  /  TBili  0.2<L>  /  DBili  x   /  AST  31  /  ALT  17  /  AlkPhos  99  02-01    PT/INR - ( 2018 12:52 )   PT: 11.1 sec;   INR: 1.01 ratio           Urinalysis Basic - ( 2018 14:10 )    Color: GREEN / Appearance: Clear / S.020 / pH: x  Gluc: x / Ketone: Trace  / Bili: Small / Urobili: Negative mg/dL   Blood: x / Protein: 100 mg/dL / Nitrite: Negative   Leuk Esterase: Trace / RBC: 3-5 /HPF / WBC 11-25   Sq Epi: x / Non Sq Epi: Moderate / Bacteria: Few        RADIOLOGY & ADDITIONAL TESTS:
DOMENIC CEBALLOS     Chief Complaint: Patient is a 62y old  Female who presents with a chief complaint of dehydration, vomiting, UTI (31 Jan 2018 16:19)      PAST MEDICAL & SURGICAL HISTORY:  Lung cancer  Hypertension  S/P cardiac catheterization: 5 cardiac stents  No significant past surgical history      HPI/OVERNIGHT EVENTS: Patient continues to be nauseous    MEDICATIONS  (STANDING):  amLODIPine   Tablet 2.5 milliGRAM(s) Oral daily  aspirin enteric coated 81 milliGRAM(s) Oral daily  ATENolol  Tablet 50 milliGRAM(s) Oral daily  atorvastatin 40 milliGRAM(s) Oral at bedtime  clopidogrel Tablet 75 milliGRAM(s) Oral daily  dronabinol 2.5 milliGRAM(s) Oral two times a day  folic acid 1 milliGRAM(s) Oral daily  lisinopril 40 milliGRAM(s) Oral daily  pantoprazole    Tablet 40 milliGRAM(s) Oral before breakfast  sodium chloride 0.9%. 1000 milliLiter(s) (40 mL/Hr) IV Continuous <Continuous>      Vital Signs Last 24 Hrs  T(C): 36.8 (02 Feb 2018 16:50), Max: 36.9 (01 Feb 2018 20:55)  T(F): 98.2 (02 Feb 2018 16:50), Max: 98.5 (01 Feb 2018 20:55)  HR: 54 (02 Feb 2018 16:50) (54 - 69)  BP: 148/76 (02 Feb 2018 16:50) (139/73 - 151/75)  BP(mean): --  RR: 20 (02 Feb 2018 16:50) (18 - 20)  SpO2: 98% (02 Feb 2018 16:50) (98% - 100%)    PHYSICAL EXAM:  Constitutional: temporal wasting  HEENT: PERRLA, EOMI, Normal Hearing, MMM  Neck: No LAD, No JVD  Back: Normal spine flexure, No CVA tenderness  Respiratory: CTAB Cardiovascular: S1 and S2, RRR, no M/G/R  Gastrointestinal: BS+, soft, NT/ND  Extremities: No peripheral edema  Vascular: 2+ peripheral pulses  Neurological: A/O x 3, no focal deficits  Psychiatric: Normal mood, normal affect     CAPILLARY BLOOD GLUCOSE    LABS:                        8.3    4.9   )-----------( 177      ( 02 Feb 2018 07:29 )             24.9     02-02    137  |  107  |  15.0  ----------------------------<  61<L>  4.4   |  14.0<L>  |  1.76<H>    Ca    8.8      02 Feb 2018 07:24  Phos  1.8     02-02  Mg     1.6     02-02    TPro  5.6<L>  /  Alb  2.9<L>  /  TBili  0.2<L>  /  DBili  x   /  AST  31  /  ALT  17  /  AlkPhos  99  02-01          RADIOLOGY & ADDITIONAL TESTS:
DOMENIC CEBALLOS     Chief Complaint: Patient is a 62y old  Female who presents with a chief complaint of dehydration, vomiting, UTI (31 Jan 2018 16:19)      PAST MEDICAL & SURGICAL HISTORY:  Lung cancer  Hypertension  S/P cardiac catheterization: 5 cardiac stents  No significant past surgical history      HPI/OVERNIGHT EVENTS: Patient continues to be nauseous. I spoke to her and her daughter for 30 minutes regarding end of life care for metastatic lung cancer.    MEDICATIONS  (STANDING):  amLODIPine   Tablet 2.5 milliGRAM(s) Oral daily  aspirin enteric coated 81 milliGRAM(s) Oral daily  ATENolol  Tablet 50 milliGRAM(s) Oral daily  atorvastatin 40 milliGRAM(s) Oral at bedtime  clopidogrel Tablet 75 milliGRAM(s) Oral daily  dronabinol 2.5 milliGRAM(s) Oral two times a day  folic acid 1 milliGRAM(s) Oral daily  lisinopril 40 milliGRAM(s) Oral daily  metoclopramide Injectable 10 milliGRAM(s) IV Push <User Schedule>  metoclopramide Injectable 10 milliGRAM(s) IV Push every 8 hours  ondansetron Injectable 4 milliGRAM(s) IV Push every 6 hours  pantoprazole  Injectable 40 milliGRAM(s) IV Push two times a day  sodium chloride 0.9%. 1000 milliLiter(s) (40 mL/Hr) IV Continuous <Continuous>      Vital Signs Last 24 Hrs  T(C): 36.1 (03 Feb 2018 08:39), Max: 36.9 (02 Feb 2018 23:20)  T(F): 97 (03 Feb 2018 08:39), Max: 98.4 (02 Feb 2018 23:20)  HR: 52 (03 Feb 2018 08:39) (52 - 54)  BP: 145/77 (03 Feb 2018 08:39) (132/76 - 148/76)  BP(mean): --  RR: 16 (03 Feb 2018 08:39) (16 - 20)  SpO2: 97% (03 Feb 2018 08:39) (97% - 98%)    PHYSICAL EXAM:  Constitutional:  temporal wasting  HEENT: PERRLA, EOMI, Normal Hearing, MMM  Neck: No LAD, No JVD  Back: Normal spine flexure, No CVA tenderness  Respiratory: CTAB Cardiovascular: S1 and S2, RRR, no M/G/R  Gastrointestinal: BS+, soft, NT/ND  Extremities: No peripheral edema  Vascular: 2+ peripheral pulses  Neurological: profound weakness    CAPILLARY BLOOD GLUCOSE    LABS:                        8.3    4.9   )-----------( 177      ( 02 Feb 2018 07:29 )             24.9     02-02    137  |  107  |  15.0  ----------------------------<  61<L>  4.4   |  14.0<L>  |  1.76<H>    Ca    8.8      02 Feb 2018 07:24  Phos  1.8     02-02  Mg     1.6     02-02            RADIOLOGY & ADDITIONAL TESTS:
DOMENIC CEBALLOS is a 62y Female with HPI:   63 y/o F, with hx of lung cancer that spread to her brain, HTN, and 5 stents in place, presents to the ED c/o nausea and emesis, onset 6 days ago.  Pt states that she was seen in the hospital on Monday and was diagnosed with a UTI BASED ON U/A NO CX DONE  AT THE TIME   Pt was admitted last pm increased weakness  BLOOD CX ADN URINE CX PENDING  ON ROCEPHIN      Allergies:  codeine (Unknown)  penicillin (Unknown)      Medications:  amLODIPine   Tablet 2.5 milliGRAM(s) Oral daily  aspirin enteric coated 81 milliGRAM(s) Oral daily  ATENolol  Tablet 50 milliGRAM(s) Oral two times a day  atorvastatin 40 milliGRAM(s) Oral at bedtime  cefTRIAXone   IVPB 1 Gram(s) IV Intermittent every 24 hours  clopidogrel Tablet 75 milliGRAM(s) Oral daily  folic acid 1 milliGRAM(s) Oral daily  lisinopril 40 milliGRAM(s) Oral daily  metoclopramide 10 milliGRAM(s) Oral three times a day PRN  ondansetron Injectable 4 milliGRAM(s) IV Push every 8 hours PRN  pantoprazole    Tablet 40 milliGRAM(s) Oral before breakfast  sodium chloride 0.9%. 1000 milliLiter(s) IV Continuous <Continuous>      ANTIBIOTICS:         Review of Systems: - Negative except as mentioned above     Physical Exam:  ICU Vital Signs Last 24 Hrs  T(C): 36.8 (01 Feb 2018 08:38), Max: 36.8 (01 Feb 2018 08:38)  T(F): 98.3 (01 Feb 2018 08:38), Max: 98.3 (01 Feb 2018 08:38)  HR: 54 (01 Feb 2018 08:38) (54 - 68)  BP: 129/66 (01 Feb 2018 08:38) (82/56 - 134/63)  BP(mean): --  ABP: --  ABP(mean): --  RR: 18 (01 Feb 2018 08:38) (18 - 20)  SpO2: 99% (01 Feb 2018 08:38) (98% - 100%)    GEN: NAD, pleasant  HEENT: normocephalic and atraumatic. EOMI. SVETLANA...  NECK: Supple. No carotid bruits.  No lymphadenopathy or thyromegaly.  LUNGS: Clear to auscultation.  HEART: Regular rate and rhythm without murmur.  ABDOMEN: Soft, nontender, and nondistended.  Positive bowel sounds.  No hepatosplenomegaly was noted.  NO REBOUND NO GUARDING  EXTREMITIES: Without any cyanosis, clubbing, rash, lesions or edema.  NEUROLOGIC: Cranial nerves II through XII are grossly intact.    SKIN: No ulceration or induration present.      Labs:                       8.6    5.1   )-----------( 177      ( 31 Jan 2018 18:56 )             25.6   01-31    141  |  104  |  18.0  ----------------------------<  77  3.5   |  18.0<L>  |  1.95<H>    Ca    9.5      31 Jan 2018 12:52    TPro  6.4<L>  /  Alb  3.2<L>  /  TBili  0.3<L>  /  DBili  x   /  AST  24  /  ALT  14  /  AlkPhos  113  01-31
DOMENIC CEBALLOS is a 62y Female with HPI:   63 y/o F, with hx of lung cancer that spread to her brain, HTN, and 5 stents in place, presents to the ED c/o nausea and emesis, onset 6 days ago.  Pt states that she was seen in the hospital on Monday and was diagnosed with a UTI BASED ON U/A NO CX DONE  AT THE TIME   Pt was admitted last pm increased weakness  BLOOD CX ADN URINE CX SO FAR NEG  MAIN COMPANT IS NAUSEA  OFF ALL ABX AFEBRILE    Allergies:  codeine (Unknown)  penicillin (Unknown)      Medications:  amLODIPine   Tablet 2.5 milliGRAM(s) Oral daily  aspirin enteric coated 81 milliGRAM(s) Oral daily  ATENolol  Tablet 50 milliGRAM(s) Oral two times a day  atorvastatin 40 milliGRAM(s) Oral at bedtime  cefTRIAXone   IVPB 1 Gram(s) IV Intermittent every 24 hours  clopidogrel Tablet 75 milliGRAM(s) Oral daily  folic acid 1 milliGRAM(s) Oral daily  lisinopril 40 milliGRAM(s) Oral daily  metoclopramide 10 milliGRAM(s) Oral three times a day PRN  ondansetron Injectable 4 milliGRAM(s) IV Push every 8 hours PRN  pantoprazole    Tablet 40 milliGRAM(s) Oral before breakfast  sodium chloride 0.9%. 1000 milliLiter(s) IV Continuous <Continuous>      ANTIBIOTICS:                         8.4    2.9   )-----------( 153      ( 05 Feb 2018 07:23 )             25.0         Review of Systems: - Negative except as mentioned above  02-05    139  |  101  |  10.0  ----------------------------<  65<L>  3.1<L>   |  16.0<L>  |  1.31<H>    Ca    8.9      05 Feb 2018 07:23  Phos  2.1     02-04  Mg     1.7     02-04       Physical Exam:  I Vital Signs Last 24 Hrs  T(C): 35.9 (05 Feb 2018 11:56), Max: 36.2 (04 Feb 2018 16:06)  T(F): 96.6 (05 Feb 2018 11:56), Max: 97.2 (04 Feb 2018 16:06)  HR: 63 (05 Feb 2018 11:56) (59 - 73)  BP: 135/71 (05 Feb 2018 11:56) (108/60 - 172/78)  BP(mean): --  RR: 17 (05 Feb 2018 11:56) (12 - 17)  SpO2: 96% (05 Feb 2018 04:44) (95% - 96%)    GEN: NAD, pleasant  HEENT: normocephalic and atraumatic. EOMI. SVETLANA...  NECK: Supple. No carotid bruits.  No lymphadenopathy or thyromegaly.  LUNGS: Clear to auscultation.  HEART: Regular rate and rhythm without murmur.  ABDOMEN: Soft, nontender, and nondistended.  Positive bowel sounds.  No hepatosplenomegaly was noted.  NO REBOUND NO GUARDING  EXTREMITIES: Without any cyanosis, clubbing, rash, lesions or edema.  NEUROLOGIC: Cranial nerves II through XII are grossly intact.    SKIN: No ulceration or induration present.      Labs:
DOMENIC CEBALLOS is a 62y Female with HPI:   63 y/o F, with hx of lung cancer that spread to her brain, HTN, and 5 stents in place, presents to the ED c/o nausea and emesis, onset 6 days ago.  Pt states that she was seen in the hospital on Monday and was diagnosed with a UTI BASED ON U/A NO CX DONE  AT THE TIME   Pt was admitted last pm increased weakness  BLOOD CX ADN URINE CX SO FAR NEG  MAIN COMPANT IS NAUSEA  ON ROCEPHIN  WILL D/C    Allergies:  codeine (Unknown)  penicillin (Unknown)      Medications:  amLODIPine   Tablet 2.5 milliGRAM(s) Oral daily  aspirin enteric coated 81 milliGRAM(s) Oral daily  ATENolol  Tablet 50 milliGRAM(s) Oral two times a day  atorvastatin 40 milliGRAM(s) Oral at bedtime  cefTRIAXone   IVPB 1 Gram(s) IV Intermittent every 24 hours  clopidogrel Tablet 75 milliGRAM(s) Oral daily  folic acid 1 milliGRAM(s) Oral daily  lisinopril 40 milliGRAM(s) Oral daily  metoclopramide 10 milliGRAM(s) Oral three times a day PRN  ondansetron Injectable 4 milliGRAM(s) IV Push every 8 hours PRN  pantoprazole    Tablet 40 milliGRAM(s) Oral before breakfast  sodium chloride 0.9%. 1000 milliLiter(s) IV Continuous <Continuous>      ANTIBIOTICS:         Review of Systems: - Negative except as mentioned above     Physical Exam:  IC ICU Vital Signs Last 24 Hrs  T(C): 36.5 (02 Feb 2018 08:36), Max: 36.9 (01 Feb 2018 20:55)  T(F): 97.7 (02 Feb 2018 08:36), Max: 98.5 (01 Feb 2018 20:55)  HR: 56 (02 Feb 2018 08:36) (54 - 69)  BP: 151/70 (02 Feb 2018 08:36) (131/71 - 151/75)  BP(mean): --  ABP: --  ABP(mean): --  RR: 18 (02 Feb 2018 08:36) (18 - 18)  SpO2: 99% (02 Feb 2018 08:36) (99% - 100%)    GEN: NAD, pleasant  HEENT: normocephalic and atraumatic. EOMI. SVETLANA...  NECK: Supple. No carotid bruits.  No lymphadenopathy or thyromegaly.  LUNGS: Clear to auscultation.  HEART: Regular rate and rhythm without murmur.  ABDOMEN: Soft, nontender, and nondistended.  Positive bowel sounds.  No hepatosplenomegaly was noted.  NO REBOUND NO GUARDING  EXTREMITIES: Without any cyanosis, clubbing, rash, lesions or edema.  NEUROLOGIC: Cranial nerves II through XII are grossly intact.    SKIN: No ulceration or induration present.      Labs:                                    8.3    4.9   )-----------( 177      ( 02 Feb 2018 07:29 )             24.9        02-02    137  |  107  |  15.0  ----------------------------<  61<L>  4.4   |  14.0<L>  |  1.76<H>    Ca    8.8      02 Feb 2018 07:24  Phos  1.8     02-02  Mg     1.6     02-02    TPro  5.6<L>  /  Alb  2.9<L>  /  TBili  0.2<L>  /  DBili  x   /  AST  31  /  ALT  17  /  AlkPhos  99  02-01
INTERVAL HPI/OVERNIGHT EVENTS: 61yo Female Patient with PMH of Lung Cancer treated with RT and chemotherapy.   She now has Metastatic Ca to Brain. Scheduled for MRI sometime today to determine if there is disease progression.  CC: Severe Nausea and vomiting persists unable to keep down and solid food-immediately vomits.  She is anxious but mood a little better today.  Both daughters at her bedside. Very weak-she denies pain dizziness CP palpitations    62y old  Female who presents with a chief complaint of dehydration, vomiting, UTI (31 Jan 2018 16:19)      Present Symptoms:     Dyspnea: 0    Nausea/Vomiting: Yes   Anxiety:  Yes   Depression: Yes   Fatigue: Yes   Loss of appetite: Yes     Pain: Deneis            Character-            Duration-            Effect-            Factors-            Frequency-            Location-            Severity-    Review of Systems: Reviewed                     All others negative    MEDICATIONS  (STANDING):  ALPRAZolam 0.5 milliGRAM(s) Oral once  amLODIPine   Tablet 2.5 milliGRAM(s) Oral daily  aspirin enteric coated 81 milliGRAM(s) Oral daily  ATENolol  Tablet 50 milliGRAM(s) Oral daily  atorvastatin 40 milliGRAM(s) Oral at bedtime  clopidogrel Tablet 75 milliGRAM(s) Oral daily  dronabinol 2.5 milliGRAM(s) Oral two times a day  folic acid 1 milliGRAM(s) Oral daily  lisinopril 40 milliGRAM(s) Oral daily  LORazepam    Concentrate 0.5 milliGRAM(s) SubLingual every 12 hours  metoclopramide Injectable 10 milliGRAM(s) IV Push <User Schedule>  multiple electrolytes Injection Type 1 1000 milliLiter(s) (80 mL/Hr) IV Continuous <Continuous>  ondansetron Injectable 4 milliGRAM(s) IV Push every 6 hours  pantoprazole  Injectable 40 milliGRAM(s) IV Push two times a day    MEDICATIONS  (PRN):  LORazepam    Concentrate 0.5 milliGRAM(s) SubLingual every 6 hours PRN Nausea and/or Vomiting      PHYSICAL EXAM:    Vital Signs Last 24 Hrs  T(C): 36.4 (06 Feb 2018 12:33), Max: 37.2 (06 Feb 2018 05:06)  T(F): 97.6 (06 Feb 2018 12:33), Max: 99 (06 Feb 2018 05:06)  HR: 72 (06 Feb 2018 12:33) (64 - 72)  BP: 146/81 (06 Feb 2018 12:33) (123/71 - 146/81)  BP(mean): --  RR: 20 (06 Feb 2018 12:33) (16 - 20)  SpO2: 97% (05 Feb 2018 21:49) (97% - 97%)    General: alert  oriented x3 ____                   cachexia    HEENTdry mouth     Lungs: comfortable       CV: normal     GI:  distended  tender                   constipation  last BM:     : normal       MSK: weakness  edema             Bed to chair      Skin: normal    no rash    LABS:                        8.4    2.9   )-----------( 153      ( 05 Feb 2018 07:23 )             25.0     02-05    139  |  101  |  10.0  ----------------------------<  65<L>  3.1<L>   |  16.0<L>  |  1.31<H>    Ca    8.9      05 Feb 2018 07:23      I&O's Summary    05 Feb 2018 07:01  -  06 Feb 2018 07:00  --------------------------------------------------------  IN: 240 mL / OUT: 0 mL / NET: 240 mL    RADIOLOGY & ADDITIONAL STUDIES:    ADVANCE DIRECTIVES:   DNR  NO  Completed on:                     SATHISH   NO   Completed on:  Living Will   NO   Completed on:
Patient is a 62y old  Female who presents with a chief complaint of dehydration, vomiting, UTI (31 Jan 2018 16:19)      HPI:  61 yo F with h/o HTN, CAD s/p PCI with 5 stents (2008) on plavix,, metastatic lung cA with brain mets s/p radiation therapy (last 8/17) and chemotherapy (11/17), presents to the ED c/o nausea and emesis,       Antiemetics were changed to around the clock an reglan was changed to IV and around the clock Today no vomiting or nausea , no abdominal pain. )      REVIEW OF SYSTEMS:  Constitutional: No fever, weight loss or fatigue  ENMT:  No difficulty hearing, tinnitus, vertigo; No sinus or throat pain  Respiratory: No cough, wheezing, chills or hemoptysis  Cardiovascular: No chest pain, palpitations, dizziness or leg swelling  Gastrointestinal: No abdominal or epigastric pain. _+ nausea, vomiting or hematemesis; No diarrhea or constipation. No melena or hematochezia.  Skin: No itching, burning, rashes or lesions   Musculoskeletal: No joint pain or swelling; No muscle, back or extremity pain    PAST MEDICAL & SURGICAL HISTORY:  Lung cancer  Hypertension  S/P cardiac catheterization: 5 cardiac stents  No significant past surgical history      FAMILY HISTORY:  No pertinent family history in first degree relatives      SOCIAL HISTORY:  Smoking Status: [ ] Current, [ ] Former, [ ] Never  Pack Years:  [  ] EtOH-no  [  ] IVDA    MEDICATIONS:  MEDICATIONS  (STANDING):  amLODIPine   Tablet 2.5 milliGRAM(s) Oral daily  aspirin enteric coated 81 milliGRAM(s) Oral daily  ATENolol  Tablet 50 milliGRAM(s) Oral daily  atorvastatin 40 milliGRAM(s) Oral at bedtime  clopidogrel Tablet 75 milliGRAM(s) Oral daily  dronabinol 2.5 milliGRAM(s) Oral two times a day  folic acid 1 milliGRAM(s) Oral daily  lisinopril 40 milliGRAM(s) Oral daily  metoclopramide Injectable 10 milliGRAM(s) IV Push <User Schedule>  ondansetron Injectable 4 milliGRAM(s) IV Push every 6 hours  pantoprazole  Injectable 40 milliGRAM(s) IV Push two times a day  sodium chloride 0.9%. 1000 milliLiter(s) (40 mL/Hr) IV Continuous <Continuous>    MEDICATIONS  (PRN):      Allergies    codeine (Unknown)  penicillin (Unknown)    Intolerances        Vital Signs Last 24 Hrs  T(C): 36.4 (04 Feb 2018 10:28), Max: 36.7 (03 Feb 2018 16:46)  T(F): 97.6 (04 Feb 2018 10:28), Max: 98 (03 Feb 2018 16:46)  HR: 46 (04 Feb 2018 10:28) (46 - 63)  BP: 142/70 (04 Feb 2018 10:28) (122/74 - 156/75)  BP(mean): --  RR: 12 (04 Feb 2018 10:28) (12 - 18)  SpO2: 100% (04 Feb 2018 10:28) (97% - 100%)    02-03 @ 07:01  -  02-04 @ 07:00  --------------------------------------------------------  IN: 960 mL / OUT: 0 mL / NET: 960 mL          PHYSICAL EXAM:    General: Well developed; well nourished; in no acute distress  HEENT: MMM, conjunctiva and sclera clear  H- RRR  L-CTA  Gastrointestinal: Soft, non-tender non-distended; Normal bowel sounds; No rebound or guarding  Extremities: Normal range of motion, No clubbing, cyanosis or edema  Neurological: Alert and oriented x3  Skin: Warm and dry. No obvious rash      LABS:                        8.6    3.1   )-----------( 158      ( 04 Feb 2018 08:36 )             25.3     04 Feb 2018 08:36    137    |  104    |  13.0   ----------------------------<  70     3.5     |  17.0   |  1.43     Ca    9.0        04 Feb 2018 08:36  Phos  2.1       04 Feb 2018 08:36  Mg     1.7       04 Feb 2018 08:36                RADIOLOGY & ADDITIONAL STUDIES:     < from: CT Abdomen and Pelvis No Cont (01.31.18 @ 12:40) >  IMPRESSION:     No acute findings provided the limitation of a noncontrast exam.    Question minimal colitis.    Incidental findings in the biliary tree, right kidney and bones as   described.    < end of copied text >
Patient is a 62y old  Female who presents with a chief complaint of dehydration, vomiting, UTI (31 Jan 2018 16:19)      HPI:  63 yo F with h/o HTN, CAD s/p PCI with 5 stents (2008) on plavix,, metastatic lung cA with brain mets s/p radiation therapy (last 8/17) and chemotherapy (11/17), presents to the ED c/o nausea and emesis. 		  Patient tried solid diet yesterday and vomited. She was only able to tolerate broth. 	    REVIEW OF SYSTEMS:  Constitutional: No fever, weight loss or fatigue  ENMT:  No difficulty hearing, tinnitus, vertigo; No sinus or throat pain  Respiratory: No cough, wheezing, chills or hemoptysis  Cardiovascular: No chest pain, palpitations, dizziness or leg swelling  Gastrointestinal: No abdominal or epigastric pain. + nausea, +vomiting or hematemesis; No diarrhea or constipation. No melena or hematochezia.  Skin: No itching, burning, rashes or lesions   Musculoskeletal: No joint pain or swelling; No muscle, back or extremity pain    PAST MEDICAL & SURGICAL HISTORY:  Lung cancer  Hypertension  S/P cardiac catheterization: 5 cardiac stents  No significant past surgical history      FAMILY HISTORY:  No pertinent family history in first degree relatives      SOCIAL HISTORY:  Smoking Status: [ ] Current, [ ] Former, [ ] Never  Pack Years:  [  ] EtOH-no  [  ] IVDA-no    MEDICATIONS:  MEDICATIONS  (STANDING):  amLODIPine   Tablet 2.5 milliGRAM(s) Oral daily  aspirin enteric coated 81 milliGRAM(s) Oral daily  ATENolol  Tablet 50 milliGRAM(s) Oral daily  atorvastatin 40 milliGRAM(s) Oral at bedtime  clopidogrel Tablet 75 milliGRAM(s) Oral daily  dronabinol 2.5 milliGRAM(s) Oral two times a day  folic acid 1 milliGRAM(s) Oral daily  lisinopril 40 milliGRAM(s) Oral daily  LORazepam    Concentrate 0.5 milliGRAM(s) SubLingual every 12 hours  metoclopramide Injectable 10 milliGRAM(s) IV Push <User Schedule>  multiple electrolytes Injection Type 1 1000 milliLiter(s) (80 mL/Hr) IV Continuous <Continuous>  ondansetron Injectable 4 milliGRAM(s) IV Push every 6 hours  pantoprazole  Injectable 40 milliGRAM(s) IV Push two times a day    MEDICATIONS  (PRN):  LORazepam    Concentrate 0.5 milliGRAM(s) SubLingual every 6 hours PRN Nausea and/or Vomiting      Allergies    codeine (Unknown)  penicillin (Unknown)    Intolerances        Vital Signs Last 24 Hrs  T(C): 37.1 (06 Feb 2018 22:15), Max: 37.1 (06 Feb 2018 22:15)  T(F): 98.8 (06 Feb 2018 22:15), Max: 98.8 (06 Feb 2018 22:15)  HR: 68 (06 Feb 2018 22:15) (62 - 72)  BP: 116/72 (06 Feb 2018 22:15) (116/72 - 159/85)  BP(mean): --  RR: 20 (06 Feb 2018 22:15) (20 - 20)  SpO2: --    02-06 @ 07:01  -  02-07 @ 07:00  --------------------------------------------------------  IN: 240 mL / OUT: 0 mL / NET: 240 mL          PHYSICAL EXAM:    General: Thin   HEENT: MMM, conjunctiva and sclera clear  H- RRR  L - CTA  Gastrointestinal: Soft, non-tender non-distended; Normal bowel sounds; No rebound or guarding  Extremities: Normal range of motion, No clubbing, cyanosis or edema  Neurological: Alert and oriented x3  Skin: Warm and dry. No obvious rash      LABS:                    RADIOLOGY & ADDITIONAL STUDIES:     < from: CT Abdomen and Pelvis No Cont (01.31.18 @ 12:40) >  MPRESSION:     No acute findings provided the limitation of a noncontrast exam.    Question minimal colitis.    Incidental findings in the biliary tree, right kidney and bones as   described.    < end of copied text >
Pt seen and examined f/u nausea and vomiting  This morning she feels slightly better but still with some nausea and coccasional vomiting. Denies any abdominal pain. Car scan of brain reveals mets and cat scan of lung and abdomen shows the lung cancer with adenopathy, bone mets but no bowel obstruction or inflammation.    REVIEW OF SYSTEMS:    CONSTITUTIONAL: No fever, weight loss, or fatigue  EYES: No eye pain, visual disturbances, or discharge  ENMT:  No difficulty hearing, tinnitus, vertigo; No sinus or throat pain  RESPIRATORY: No cough, wheezing, chills or hemoptysis; No shortness of breath  CARDIOVASCULAR: No chest pain, palpitations, dizziness, or leg swelling  GASTROINTESTINAL: No abdominal or epigastric pain. No nausea, vomiting, or hematemesis; No diarrhea or constipation. No melena or hematochezia.    MEDICATIONS:  MEDICATIONS  (STANDING):  amLODIPine   Tablet 2.5 milliGRAM(s) Oral daily  aspirin enteric coated 81 milliGRAM(s) Oral daily  ATENolol  Tablet 50 milliGRAM(s) Oral daily  atorvastatin 40 milliGRAM(s) Oral at bedtime  clopidogrel Tablet 75 milliGRAM(s) Oral daily  dronabinol 2.5 milliGRAM(s) Oral two times a day  folic acid 1 milliGRAM(s) Oral daily  lisinopril 40 milliGRAM(s) Oral daily  LORazepam    Concentrate 0.5 milliGRAM(s) SubLingual every 12 hours  metoclopramide 10 milliGRAM(s) Oral Before meals and at bedtime  multiple electrolytes Injection Type 1 1000 milliLiter(s) (80 mL/Hr) IV Continuous <Continuous>  ondansetron Injectable 6 milliGRAM(s) IV Push every 6 hours  pantoprazole  Injectable 40 milliGRAM(s) IV Push two times a day    MEDICATIONS  (PRN):  hydrALAZINE 25 milliGRAM(s) Oral every 8 hours PRN Systolic blood pressure >150  LORazepam    Concentrate 0.5 milliGRAM(s) SubLingual every 6 hours PRN Nausea and/or Vomiting      Allergies    codeine (Unknown)  penicillin (Unknown)    Intolerances        Vital Signs Last 24 Hrs  T(C): 36.9 (08 Feb 2018 05:25), Max: 36.9 (08 Feb 2018 05:25)  T(F): 98.4 (08 Feb 2018 05:25), Max: 98.4 (08 Feb 2018 05:25)  HR: 75 (08 Feb 2018 05:25) (68 - 75)  BP: 109/67 (08 Feb 2018 05:25) (109/67 - 168/98)  BP(mean): --  RR: 19 (08 Feb 2018 05:25) (18 - 20)  SpO2: 96% (08 Feb 2018 05:25) (95% - 98%)    02-07 @ 07:01  -  02-08 @ 07:00  --------------------------------------------------------  IN: 960 mL / OUT: 0 mL / NET: 960 mL        PHYSICAL EXAM:    General: ill appearing female in no acute distress  HEENT: MMM, conjunctiva pale and sclera clear  Gastrointestinal:Abdomen: Soft non-tender non-distended; Normal bowel sounds; No hepatosplenomegaly  Extremities: no cyanosis, clubbing or edema.  Skin: Warm and dry. No obvious rash    LABS:      CBC Full  -  ( 08 Feb 2018 06:47 )  WBC Count : 3.3 K/uL  Hemoglobin : 7.5 g/dL  Hematocrit : 22.4 %  Platelet Count - Automated : 132 K/uL  Mean Cell Volume : 99.1 fl  Mean Cell Hemoglobin : 33.2 pg  Mean Cell Hemoglobin Concentration : 33.5 g/dL  Auto Neutrophil # : x  Auto Lymphocyte # : x  Auto Monocyte # : x  Auto Eosinophil # : x  Auto Basophil # : x  Auto Neutrophil % : x  Auto Lymphocyte % : x  Auto Monocyte % : x  Auto Eosinophil % : x  Auto Basophil % : x    02-08    144  |  107  |  3.0<L>  ----------------------------<  74  4.0   |  24.0  |  0.94    Ca    8.9      08 Feb 2018 06:47                        RADIOLOGY & ADDITIONAL STUDIES (The following images were personally reviewed):  < from: CT Abdomen and Pelvis w/ IV Cont (02.07.18 @ 10:26) >  PROCEDURE:   CT of the Chest, abdomen and pelvis was performed with intravenous   contrast.  90ml of Omnipaque 350 was injected intravenously. 10cc was discarded.    FINDINGS:    CHEST:    CHEST WALL AND LOWER NECK: Right chemotherapy port catheter in the SVC.  MEDIASTINUM AND ANNIKA: Mild mediastinal adenopathy up to 1.3 cm.  HEART: Cardiomegaly. Extensive coronary calcifications.  VESSELS: Within normal limits  LUNG AND LARGE AIRWAYS: Spiculated opacity right apex is stable. 1.4 x   3.1 cm spiculated nodule left upper lobe with streaky opacities abutting   the fissure, stable. Emphysema and scarring.    ABDOMEN:  LIVER: Within normal limits  BILE DUCTS: Normal caliber  GALLBLADDER: Cholelithiasis  PANCREAS: within normal limits  SPLEEN: within normal limits  ADRENALS: 2.2 cm right adrenal nodule, stable from 1/31, new from 2015,   metastasis in the differential.  KIDNEYS: 1.1 cm indeterminate right renal lesion, stable from 1/31, new   from 2015,indeterminate. 1 cm left renal hypodensity to small to   characterize.    PELVIS:  REPRODUCTIVE ORGANS: no pelvic masses  BLADDER: Distended, correlate with urine output.    BOWEL: Within normal limits  PERITONEUM: Trace pelvic ascites, no free air, no fluid collection.    VESSELS: Extensive arterial calcifications.  RETROPERITONEUM: No enlarged retroperitoneal or pelvic nodes  ABDOMINAL WALL: Within normal limits  BONES: Sclerotic lesion L1 vertebra posterior cortex and left iliac bone,   stablefrom 1/31 but new from 2015, possibly metastatic.    IMPRESSION:     Left lung lesion and mild adenopathy, grossly stable. Spiculated   indeterminate opacity right apex, stable.    2.2 cm right adrenal nodule, stable from 1/31, new from 2015, metastasis   in the differential.    Sclerotic lesion L1 vertebra posterior cortex and left iliac bone, stable   from 1/31 but new from 2015, possibly metastatic.    Indeterminate right renal lesion as above. Other incidental findings as   above.                            MATI JANE M.D., ATTENDING RADIOLOGIST  This document has been electronically signed. Feb 7 2018 11:28AM                  < end of copied text >
Pt seen and examined. She states that she tolerated a full liquid diet yesterday w/o nausea or vomiting and wishes to try solid food today.     MEDICATIONS:  MEDICATIONS  (STANDING):  amLODIPine   Tablet 2.5 milliGRAM(s) Oral daily  aspirin enteric coated 81 milliGRAM(s) Oral daily  ATENolol  Tablet 50 milliGRAM(s) Oral daily  atorvastatin 40 milliGRAM(s) Oral at bedtime  clopidogrel Tablet 75 milliGRAM(s) Oral daily  dronabinol 2.5 milliGRAM(s) Oral two times a day  folic acid 1 milliGRAM(s) Oral daily  lisinopril 40 milliGRAM(s) Oral daily  metoclopramide Injectable 10 milliGRAM(s) IV Push <User Schedule>  ondansetron Injectable 4 milliGRAM(s) IV Push every 6 hours  pantoprazole  Injectable 40 milliGRAM(s) IV Push two times a day  sodium chloride 0.9%. 1000 milliLiter(s) (40 mL/Hr) IV Continuous <Continuous>    MEDICATIONS  (PRN):      Allergies    codeine (Unknown)  penicillin (Unknown)    Intolerances        Vital Signs Last 24 Hrs  T(C): 37.2 (06 Feb 2018 05:06), Max: 37.2 (06 Feb 2018 05:06)  T(F): 99 (06 Feb 2018 05:06), Max: 99 (06 Feb 2018 05:06)  HR: 70 (06 Feb 2018 05:06) (63 - 70)  BP: 123/71 (06 Feb 2018 05:06) (123/71 - 145/71)  BP(mean): --  RR: 18 (06 Feb 2018 05:06) (16 - 18)  SpO2: 97% (05 Feb 2018 21:49) (97% - 97%)    02-05 @ 07:01  -  02-06 @ 07:00  --------------------------------------------------------  IN: 240 mL / OUT: 0 mL / NET: 240 mL        PHYSICAL EXAM:    General: Well developed; well nourished; in no acute distress  HEENT: MMM, conjunctiva pink and sclera anicteric.  Lungs: clear to auscultation bilaterally  Cor: RRR S1, S2 only.  Gastrointestinal: Abdomen: Soft non-tender non-distended; Normal bowel sounds; No hepatosplenomegaly  Extremities: no cyanosis, clubbing or edema.  Skin: Warm and dry. No obvious rash  Neuro: Pt. a + o x 3    LABS:      CBC Full  -  ( 05 Feb 2018 07:23 )  WBC Count : 2.9 K/uL  Hemoglobin : 8.4 g/dL  Hematocrit : 25.0 %  Platelet Count - Automated : 153 K/uL  Mean Cell Volume : 99.6 fl  Mean Cell Hemoglobin : 33.5 pg  Mean Cell Hemoglobin Concentration : 33.6 g/dL  Auto Neutrophil # : x  Auto Lymphocyte # : x  Auto Monocyte # : x  Auto Eosinophil # : x  Auto Basophil # : x  Auto Neutrophil % : x  Auto Lymphocyte % : x  Auto Monocyte % : x  Auto Eosinophil % : x  Auto Basophil % : x    02-05    139  |  101  |  10.0  ----------------------------<  65<L>  3.1<L>   |  16.0<L>  |  1.31<H>    Ca    8.9      05 Feb 2018 07:23  Phos  2.1     02-04  Mg     1.7     02-04                        RADIOLOGY & ADDITIONAL STUDIES (The following images were personally reviewed):
Patient is a 62y old  Female who presents with a chief complaint of dehydration, vomiting, UTI (31 Jan 2018 16:19)      HPI:  63 yo F with h/o HTN, CAD s/p PCI with 5 stents (2008) on plavix,, metastatic lung cA with brain mets s/p radiation therapy (last 8/17) and chemotherapy (11/17), presents to the ED c/o nausea and emesis, . Pt on around the clock antiemetics. Feels no nausea today, but poor appetite. Had one bowl of broth yesterday, but no vomiting. No abdominal pain.     REVIEW OF SYSTEMS:  Constitutional: No fever, weight loss or fatigue  ENMT:  No difficulty hearing, tinnitus, vertigo; No sinus or throat pain  Respiratory: No cough, wheezing, chills or hemoptysis  Cardiovascular: No chest pain, palpitations, dizziness or leg swelling  Gastrointestinal: No abdominal or epigastric pain. No nausea, vomiting or hematemesis; No diarrhea or constipation. No melena or hematochezia.  Skin: No itching, burning, rashes or lesions   Musculoskeletal: No joint pain or swelling; No muscle, back or extremity pain    PAST MEDICAL & SURGICAL HISTORY:  Lung cancer  Hypertension  S/P cardiac catheterization: 5 cardiac stents  No significant past surgical history      FAMILY HISTORY:  No pertinent family history in first degree relatives      SOCIAL HISTORY:  Smoking Status: [ ] Current, [ ] Former, [ ] Never  Pack Years:  [  ] EtOH-no  [  ] IVDA    MEDICATIONS:  MEDICATIONS  (STANDING):  amLODIPine   Tablet 2.5 milliGRAM(s) Oral daily  aspirin enteric coated 81 milliGRAM(s) Oral daily  ATENolol  Tablet 50 milliGRAM(s) Oral daily  atorvastatin 40 milliGRAM(s) Oral at bedtime  clopidogrel Tablet 75 milliGRAM(s) Oral daily  dronabinol 2.5 milliGRAM(s) Oral two times a day  folic acid 1 milliGRAM(s) Oral daily  lisinopril 40 milliGRAM(s) Oral daily  metoclopramide Injectable 10 milliGRAM(s) IV Push <User Schedule>  ondansetron Injectable 4 milliGRAM(s) IV Push every 6 hours  pantoprazole  Injectable 40 milliGRAM(s) IV Push two times a day  sodium chloride 0.9%. 1000 milliLiter(s) (40 mL/Hr) IV Continuous <Continuous>    MEDICATIONS  (PRN):      Allergies    codeine (Unknown)  penicillin (Unknown)    Intolerances        Vital Signs Last 24 Hrs  T(C): 36.2 (05 Feb 2018 00:08), Max: 36.4 (04 Feb 2018 10:28)  T(F): 97.1 (05 Feb 2018 00:08), Max: 97.6 (04 Feb 2018 10:28)  HR: 69 (05 Feb 2018 04:44) (46 - 73)  BP: 134/75 (05 Feb 2018 04:44) (108/60 - 172/78)  BP(mean): --  RR: 15 (05 Feb 2018 04:44) (12 - 15)  SpO2: 96% (05 Feb 2018 04:44) (95% - 100%)    02-04 @ 07:01  -  02-05 @ 07:00  --------------------------------------------------------  IN: 1160 mL / OUT: 2 mL / NET: 1158 mL          PHYSICAL EXAM:    General: Well developed; well nourished; in no acute distress  HEENT: MMM, conjunctiva and sclera clear  H- RRR  L -CTA  Gastrointestinal: Soft, non-tender non-distended; Normal bowel sounds; No rebound or guarding  Extremities: Normal range of motion, No clubbing, cyanosis or edema  Neurological: Alert and oriented x3  Skin: Warm and dry. No obvious rash      LABS:                        8.4    2.9   )-----------( 153      ( 05 Feb 2018 07:23 )             25.0     05 Feb 2018 07:23    139    |  101    |  10.0   ----------------------------<  65     3.1     |  16.0   |  1.31     Ca    8.9        05 Feb 2018 07:23  Phos  2.1       04 Feb 2018 08:36  Mg     1.7       04 Feb 2018 08:36                RADIOLOGY & ADDITIONAL STUDIES:     < from: CT Abdomen and Pelvis No Cont (01.31.18 @ 12:40) >  IMPRESSION:     No acute findings provided the limitation of a noncontrast exam.    Question minimal colitis.    Incidental findings in the biliary tree, right kidney and bones as   described.    < end of copied text >

## 2018-02-08 NOTE — PROGRESS NOTE ADULT - PROBLEM SELECTOR PROBLEM 2
Intractable vomiting with nausea, unspecified vomiting type
Malignant neoplasm of lung, unspecified laterality, unspecified part of lung

## 2018-02-08 NOTE — PROGRESS NOTE ADULT - PROVIDER SPECIALTY LIST ADULT
Gastroenterology
Hospitalist
Infectious Disease
Palliative Care
Gastroenterology
Palliative Care

## 2018-02-08 NOTE — PROGRESS NOTE ADULT - PROBLEM SELECTOR PLAN 1
Improved. Will advance diet to low fat and observe for tolerance. Continue current antiemetics PRN nausea +/or vomiting. Repeat labs ordered for tomorrow AM.
Patient with lung CA with mets to brain. Continues with nausea and  vomiting. Tolerating only clears despite PPI bid, reglan and zofran ATC. Please call oncology and see if they have recommendations for the nausea and vomiting as this may be due to her brain mets ( central cause of nausea and vomiting)
Symptoms are improving . Will give one more day of clear liquids and advanace tomorrow
probably due to lung cancer with brain mets- no obvious GI pathology. Will increase zofran to 6mg IV every 6 hours and change reglan to 10mg PO q1/2 hr ac. Continue pnatoprazole. Nothing further to offer from GI standpoint.
Continue rocephin  Follow urine culture
I spoke with Dr Lara, culture negative, dc abx.
I spoke with Dr Lara, culture negative, dc abx.
No evidence of it, neg UCx, off abx
tolerating clear, but poor appetite. Pt wants to advance to full liquid diet

## 2018-02-09 ENCOUNTER — TRANSCRIPTION ENCOUNTER (OUTPATIENT)
Age: 63
End: 2018-02-09

## 2018-02-09 VITALS
RESPIRATION RATE: 17 BRPM | OXYGEN SATURATION: 96 % | DIASTOLIC BLOOD PRESSURE: 83 MMHG | HEART RATE: 73 BPM | SYSTOLIC BLOOD PRESSURE: 153 MMHG

## 2018-02-09 LAB
HCT VFR BLD CALC: 25.9 % — LOW (ref 37–47)
HGB BLD-MCNC: 8.8 G/DL — LOW (ref 12–16)
MCHC RBC-ENTMCNC: 32.6 PG — HIGH (ref 27–31)
MCHC RBC-ENTMCNC: 34 G/DL — SIGNIFICANT CHANGE UP (ref 32–36)
MCV RBC AUTO: 95.9 FL — SIGNIFICANT CHANGE UP (ref 81–99)
PLATELET # BLD AUTO: 124 K/UL — LOW (ref 150–400)
RBC # BLD: 2.7 M/UL — LOW (ref 4.4–5.2)
RBC # FLD: 17.8 % — HIGH (ref 11–15.6)
WBC # BLD: 2.9 K/UL — LOW (ref 4.8–10.8)
WBC # FLD AUTO: 2.9 K/UL — LOW (ref 4.8–10.8)

## 2018-02-09 PROCEDURE — 80053 COMPREHEN METABOLIC PANEL: CPT

## 2018-02-09 PROCEDURE — 86900 BLOOD TYPING SEROLOGIC ABO: CPT

## 2018-02-09 PROCEDURE — 81001 URINALYSIS AUTO W/SCOPE: CPT

## 2018-02-09 PROCEDURE — 80061 LIPID PANEL: CPT

## 2018-02-09 PROCEDURE — 97163 PT EVAL HIGH COMPLEX 45 MIN: CPT

## 2018-02-09 PROCEDURE — 84484 ASSAY OF TROPONIN QUANT: CPT

## 2018-02-09 PROCEDURE — 82962 GLUCOSE BLOOD TEST: CPT

## 2018-02-09 PROCEDURE — 87581 M.PNEUMON DNA AMP PROBE: CPT

## 2018-02-09 PROCEDURE — 80048 BASIC METABOLIC PNL TOTAL CA: CPT

## 2018-02-09 PROCEDURE — 87086 URINE CULTURE/COLONY COUNT: CPT

## 2018-02-09 PROCEDURE — 85027 COMPLETE CBC AUTOMATED: CPT

## 2018-02-09 PROCEDURE — 99239 HOSP IP/OBS DSCHRG MGMT >30: CPT

## 2018-02-09 PROCEDURE — 87486 CHLMYD PNEUM DNA AMP PROBE: CPT

## 2018-02-09 PROCEDURE — 83735 ASSAY OF MAGNESIUM: CPT

## 2018-02-09 PROCEDURE — 97116 GAIT TRAINING THERAPY: CPT

## 2018-02-09 PROCEDURE — 93005 ELECTROCARDIOGRAM TRACING: CPT

## 2018-02-09 PROCEDURE — 86901 BLOOD TYPING SEROLOGIC RH(D): CPT

## 2018-02-09 PROCEDURE — 74177 CT ABD & PELVIS W/CONTRAST: CPT

## 2018-02-09 PROCEDURE — 96375 TX/PRO/DX INJ NEW DRUG ADDON: CPT

## 2018-02-09 PROCEDURE — 86850 RBC ANTIBODY SCREEN: CPT

## 2018-02-09 PROCEDURE — 36430 TRANSFUSION BLD/BLD COMPNT: CPT

## 2018-02-09 PROCEDURE — 83605 ASSAY OF LACTIC ACID: CPT

## 2018-02-09 PROCEDURE — 97530 THERAPEUTIC ACTIVITIES: CPT

## 2018-02-09 PROCEDURE — 71046 X-RAY EXAM CHEST 2 VIEWS: CPT

## 2018-02-09 PROCEDURE — 84100 ASSAY OF PHOSPHORUS: CPT

## 2018-02-09 PROCEDURE — 94640 AIRWAY INHALATION TREATMENT: CPT

## 2018-02-09 PROCEDURE — 86923 COMPATIBILITY TEST ELECTRIC: CPT

## 2018-02-09 PROCEDURE — 70450 CT HEAD/BRAIN W/O DYE: CPT

## 2018-02-09 PROCEDURE — 70553 MRI BRAIN STEM W/O & W/DYE: CPT

## 2018-02-09 PROCEDURE — 87040 BLOOD CULTURE FOR BACTERIA: CPT

## 2018-02-09 PROCEDURE — 71260 CT THORAX DX C+: CPT

## 2018-02-09 PROCEDURE — 85610 PROTHROMBIN TIME: CPT

## 2018-02-09 PROCEDURE — 96374 THER/PROPH/DIAG INJ IV PUSH: CPT

## 2018-02-09 PROCEDURE — 74176 CT ABD & PELVIS W/O CONTRAST: CPT

## 2018-02-09 PROCEDURE — 87798 DETECT AGENT NOS DNA AMP: CPT

## 2018-02-09 PROCEDURE — 87633 RESP VIRUS 12-25 TARGETS: CPT

## 2018-02-09 PROCEDURE — P9040: CPT

## 2018-02-09 PROCEDURE — 99285 EMERGENCY DEPT VISIT HI MDM: CPT | Mod: 25

## 2018-02-09 PROCEDURE — 36415 COLL VENOUS BLD VENIPUNCTURE: CPT

## 2018-02-09 RX ORDER — METOCLOPRAMIDE HCL 10 MG
1 TABLET ORAL
Qty: 120 | Refills: 0 | OUTPATIENT
Start: 2018-02-09 | End: 2018-03-10

## 2018-02-09 RX ORDER — OXYBUTYNIN CHLORIDE 5 MG
0 TABLET ORAL
Qty: 0 | Refills: 0 | COMMUNITY

## 2018-02-09 RX ORDER — HYDRALAZINE HCL 50 MG
1 TABLET ORAL
Qty: 90 | Refills: 0 | OUTPATIENT
Start: 2018-02-09 | End: 2018-03-10

## 2018-02-09 RX ORDER — METHENAMINE, SODIUM PHOSPHATE, MONOBASIC, MONOHYDRATE, PHENYL SALICYLATE, METHYLENE BLUE, AND HYOSCYAMINE SULFATE 120; 40.8; 36; 10; .12 MG/1; MG/1; MG/1; MG/1; MG/1
0 CAPSULE ORAL
Qty: 0 | Refills: 0 | COMMUNITY

## 2018-02-09 RX ORDER — DRONABINOL 2.5 MG
1 CAPSULE ORAL
Qty: 60 | Refills: 0 | OUTPATIENT
Start: 2018-02-09 | End: 2018-03-10

## 2018-02-09 RX ORDER — POTASSIUM CHLORIDE 20 MEQ
0 PACKET (EA) ORAL
Qty: 0 | Refills: 0 | COMMUNITY

## 2018-02-09 RX ORDER — ROSUVASTATIN CALCIUM 5 MG/1
1 TABLET ORAL
Qty: 0 | Refills: 0 | COMMUNITY

## 2018-02-09 RX ADMIN — PANTOPRAZOLE SODIUM 40 MILLIGRAM(S): 20 TABLET, DELAYED RELEASE ORAL at 06:02

## 2018-02-09 RX ADMIN — Medication 2.5 MILLIGRAM(S): at 06:11

## 2018-02-09 RX ADMIN — LISINOPRIL 40 MILLIGRAM(S): 2.5 TABLET ORAL at 06:01

## 2018-02-09 RX ADMIN — Medication 0.5 MILLIGRAM(S): at 06:12

## 2018-02-09 RX ADMIN — Medication 10 MILLIGRAM(S): at 15:48

## 2018-02-09 RX ADMIN — Medication 10 MILLIGRAM(S): at 06:00

## 2018-02-09 RX ADMIN — Medication 1 MILLIGRAM(S): at 12:43

## 2018-02-09 RX ADMIN — Medication 81 MILLIGRAM(S): at 12:43

## 2018-02-09 RX ADMIN — AMLODIPINE BESYLATE 2.5 MILLIGRAM(S): 2.5 TABLET ORAL at 06:00

## 2018-02-09 RX ADMIN — Medication 600 MILLIGRAM(S): at 06:01

## 2018-02-09 RX ADMIN — ATENOLOL 50 MILLIGRAM(S): 25 TABLET ORAL at 06:00

## 2018-02-09 RX ADMIN — Medication 10 MILLIGRAM(S): at 12:43

## 2018-02-09 RX ADMIN — CLOPIDOGREL BISULFATE 75 MILLIGRAM(S): 75 TABLET, FILM COATED ORAL at 12:43

## 2018-02-09 RX ADMIN — Medication 300 UNIT(S): at 16:06

## 2018-02-09 RX ADMIN — Medication 25 MILLIGRAM(S): at 15:49

## 2018-02-09 NOTE — DISCHARGE NOTE ADULT - MEDICATION SUMMARY - MEDICATIONS TO STOP TAKING
I will STOP taking the medications listed below when I get home from the hospital:    Crestor 40 mg oral tablet  -- 1 tab(s) by mouth once a day (at bedtime)    Bactrim  mg-160 mg oral tablet  -- 1 tab(s) by mouth 2 times a day   -- Avoid prolonged or excessive exposure to direct and/or artificial sunlight while taking this medication.  Finish all this medication unless otherwise directed by prescriber.  Medication should be taken with plenty of water.    Uribel oral capsule    potassium chloride    oxybutynin

## 2018-02-09 NOTE — DISCHARGE NOTE ADULT - SECONDARY DIAGNOSIS.
Dehydration MERCEDES (acute kidney injury) Malignant neoplasm of lung, unspecified laterality, unspecified part of lung Pancytopenia due to chemotherapy

## 2018-02-09 NOTE — DISCHARGE NOTE ADULT - DURABLE MEDICAL EQUIPMENT AGENCY
[facility] SURGICAL SUPPLY [phone] FOR ROLLING WALKER DELIVERED TO BEDSIDE PRIOR TO DISCHARGE HOME

## 2018-02-09 NOTE — DISCHARGE NOTE ADULT - MEDICATION SUMMARY - MEDICATIONS TO TAKE
I will START or STAY ON the medications listed below when I get home from the hospital:    aspirin 81 mg oral tablet  -- 1 tab(s) by mouth once a day  -- Indication: For CAD    lisinopril 40 mg oral tablet  -- 1 tab(s) by mouth once a day  -- Indication: For HTN    LORazepam 2 mg/mL oral concentrate  -- 0.25 milliliter(s) sublingually every 6 hours, As Needed MDD:1 ml  -- Indication: For N/V    LORazepam 2 mg/mL oral concentrate  -- 0.25 milliliter(s) under tongue every 12 hours MDD:0.5 ml   -- Indication: For N/V    metoclopramide 10 mg oral tablet  -- 1 tab(s) by mouth 4 times a day (before meals and at bedtime)  -- Indication: For N/V    Zofran ODT 4 mg oral tablet, disintegrating  -- 1 tab(s) by mouth every 4 hours, As Needed -for nausea   -- Indication: For N/V    dronabinol 2.5 mg oral capsule  -- 1 cap(s) by mouth 2 times a day MDD:2 cap  -- Indication: For N/V    Plavix 75 mg oral tablet  -- Indication: For CAD    atenolol 50 mg oral tablet  -- 1 tab(s) by mouth 2 times a day  -- Indication: For HTN    amLODIPine 2.5 mg oral tablet  -- 1 tab(s) by mouth once a day  -- Indication: For HTN    hydrALAZINE 25 mg oral tablet  -- 1 tab(s) by mouth every 8 hours, As needed, Systolic blood pressure >150  -- Indication: For HTN    folic acid 1 mg oral tablet  -- 1 tab(s) by mouth once a day  -- Indication: For supplement

## 2018-02-09 NOTE — DISCHARGE NOTE ADULT - PROVIDER TOKENS
FREE:[LAST:[Eng],PHONE:[(   )    -],FAX:[(   )    -],ADDRESS:[Oncology at [facility]]]

## 2018-02-09 NOTE — DISCHARGE NOTE ADULT - HOSPITAL COURSE
CC: dehydration, vomiting, UTI (31 Jan 2018 16:19)  HPI: 62 y.o. Female with PMHx of HTN, CAD s/p PCI with 5 stents (2008) on plavix,, metastatic lung CA with brain mets s/p radiation therapy (last 8/17) and chemotherapy (11/17), presents to the ED c/o nausea and emesis, onset 6 days ago.  Pt states that she was seen in the [facility] on Monday and was diagnosed with a UTI.  Pt was given abx and Zofran to treat her sx.  She notes that the medicine has not helped her sx.  Pt notes 3 episodes of emesis last night.  Pt has not eaten or drank fluids in 6 days.  Lung cancer has been treated with chemotherapy, but she has not been for a treatment since September.  Notes that she was told her scans look better and the cancer is shrinking.  Last MRI of the brain was 2 months ago.  Denies fever, chills, pain, or cough.   Pt has taken steroids in the past, but does not like the side effects she feels from them. Treated at the [facility] for her cancer. Pt is  allergic to codeine and penicillin. (31 Jan 2018 16:19)  INTERVAL HPI/OVERNIGHT EVENTS: pt feels less nauseous, c/o extreme weakness, no appetite, tolerates po, very weak and unsteady - needs assistance, minimal to no po intake, Other ROS reviewed and neg   Vital Signs Last 24 Hrs  T(C): 36.3 (09 Feb 2018 04:11), Max: 37.2 (08 Feb 2018 15:32)  T(F): 97.3 (09 Feb 2018 04:11), Max: 99 (08 Feb 2018 15:32)  HR: 66 (09 Feb 2018 04:11) (66 - 82)  BP: 137/83 (09 Feb 2018 04:11) (108/68 - 141/81)  RR: 18 (09 Feb 2018 04:11) (18 - 20)  SpO2: 97% (09 Feb 2018 04:11) (97% - 99%)                     8.8    2.9   )-----------( 124      ( 09 Feb 2018 07:27 )             25.9   08 Feb 2018 06:47    144    |  107    |  3.0    ----------------------------<  74     4.0     |  24.0   |  0.94   Ca    8.9        08 Feb 2018 06:47  MEDICATIONS: reviewed  RADIOLOGY & ADDITIONAL TESTS: personally visualized  PHYSICAL EXAM:  General: debilitated fragile female in no acute distress  Eyes: PERRLA, EOMI; conjunctiva and sclera clear, pale  Head: Normocephalic; atraumatic  ENMT: No nasal discharge; airway clear, dry mucosal membranes  Neck: Supple; non tender; no masses  Respiratory: No wheezes, rales or rhonchi, decreased BS at bases  Cardiovascular: Regular rate and rhythm. S1 and S2  Gastrointestinal: Soft non-tender non-distended; Normal bowel sounds  Genitourinary: No costovertebral angle tenderness  Extremities: + range of motion, No clubbing, cyanosis or edema  Vascular: Peripheral pulses palpable 2+ bilaterally  Neurological: Alert and oriented x 3  Skin: Warm and dry. Pale  Musculoskeletal: Normal tone, without deformities  Psychiatric: Cooperative    Assessment and Plan:   · Assessment		  61 y/o Female with hx of lung cancer that spread to her brain, HTN, and 5 stents in place, presents to the ED c/o nausea and emesis, onset 6 days ago.  Pt states that she was seen in the hospital on Monday and was diagnosed with a UTI BASED ON U/A NO CX DONE  AT THE TIME   Pt was admitted last pm increased weakness.  2/3 GI input appreciated for severe nausea, iv regland, iv zofran, iv protonix, clear liquid diet with ensure clear. Palliative care consulted.    Problem/Plan - 1:  ·  Problem: Acute cystitis without hematuria.  Plan: No evidence of it, neg UCx, off abx.   Problem/Plan - 2:  ·  Problem: Malignant neoplasm of lung, unspecified laterality, unspecified part of lung.  Plan: with metastatic brain dx, oncology Dr. Luna at [facility]  pt is in poor functional status with minimal po intake and not ambulating - need to rescan for progression of Dx - MRI brain, CT chest/abd/pelvis and if progression of dx home hospice would be recommended as discussed with pt's oncologist   - stable imaging compared to studies from 1/17.   Problem/Plan - 3:  ·  Problem: Intractable vomiting with nausea, unspecified vomiting type.  Plan: IVF hydration with improved prerenal azotemia and around the clock zofran/reglan  - diet as tolerated - advanced, DC IVF.   Problem/Plan - 4:  ·  Problem: Dehydration.  Plan: encourage po fluids.   Problem/Plan - 5:  ·  Problem: Hypokalemia due to inadequate potassium intake.  Plan: repleted.   Problem/Plan - 6:  Problem: Pancytopenia due to chemotherapy. Plan: s/p transfusion PRBC 1 unit with improved HH  Attending Attestation:   Discussed with Dr. Su Luna at [facility] [phone] [facility] IV uncurable Dx, did not tolerate last chemoTx, possible palliative immunotx if acceptable functional status  May need to consider comfort care - pt plans to f/u for chemoTx after will get better - discussed again, refused hospice at the moment - will f/u with oncologist. Pt is not in proper functional status to proceed with immunoTx  HTN - atenolol, lisinopril, norvasc, hydralazine PRN.   Plan discussed with RN and pt, family at bedside.  Medically stable for DC  Time spent 70 min  Dr. Luna notified

## 2018-02-09 NOTE — DISCHARGE NOTE ADULT - PATIENT PORTAL LINK FT
You can access the [facility] Patient Portal, offered by , by registering with the following website: [email or web address]

## 2018-02-09 NOTE — DISCHARGE NOTE ADULT - CARE PLAN
Principal Discharge DX:	Intractable vomiting with nausea, unspecified vomiting type  Goal:	prevention  Assessment and plan of treatment:	continue current medications  Secondary Diagnosis:	Dehydration  Assessment and plan of treatment:	good oral water intake  Secondary Diagnosis:	MERCEDES (acute kidney injury)  Assessment and plan of treatment:	resolved  Secondary Diagnosis:	Malignant neoplasm of lung, unspecified laterality, unspecified part of lung  Assessment and plan of treatment:	f/u with Dr. Luna  Secondary Diagnosis:	Pancytopenia due to chemotherapy  Assessment and plan of treatment:	monitor

## 2018-02-20 ENCOUNTER — INPATIENT (INPATIENT)
Facility: HOSPITAL | Age: 63
LOS: 3 days | Discharge: HOME | DRG: 682 | End: 2018-02-24
Attending: HOSPITALIST | Admitting: HOSPITALIST
Payer: MEDICARE

## 2018-02-20 VITALS
OXYGEN SATURATION: 98 % | SYSTOLIC BLOOD PRESSURE: 147 MMHG | DIASTOLIC BLOOD PRESSURE: 91 MMHG | WEIGHT: 100.09 LBS | TEMPERATURE: 97 F | HEART RATE: 72 BPM | HEIGHT: 64 IN | RESPIRATION RATE: 16 BRPM

## 2018-02-20 DIAGNOSIS — C34.90 MALIGNANT NEOPLASM OF UNSPECIFIED PART OF UNSPECIFIED BRONCHUS OR LUNG: ICD-10-CM

## 2018-02-20 LAB
ALBUMIN SERPL ELPH-MCNC: 2.9 G/DL — LOW (ref 3.3–5.2)
ALP SERPL-CCNC: 82 U/L — SIGNIFICANT CHANGE UP (ref 40–120)
ALT FLD-CCNC: 12 U/L — SIGNIFICANT CHANGE UP
ANION GAP SERPL CALC-SCNC: 15 MMOL/L — SIGNIFICANT CHANGE UP (ref 5–17)
APTT BLD: 25.2 SEC — LOW (ref 27.5–37.4)
AST SERPL-CCNC: 14 U/L — SIGNIFICANT CHANGE UP
BILIRUB SERPL-MCNC: 0.3 MG/DL — LOW (ref 0.4–2)
BUN SERPL-MCNC: 20 MG/DL — SIGNIFICANT CHANGE UP (ref 8–20)
CALCIUM SERPL-MCNC: 9.1 MG/DL — SIGNIFICANT CHANGE UP (ref 8.6–10.2)
CHLORIDE SERPL-SCNC: 106 MMOL/L — SIGNIFICANT CHANGE UP (ref 98–107)
CO2 SERPL-SCNC: 19 MMOL/L — LOW (ref 22–29)
CREAT SERPL-MCNC: 1.99 MG/DL — HIGH (ref 0.5–1.3)
EOSINOPHIL # BLD AUTO: 0 K/UL — SIGNIFICANT CHANGE UP (ref 0–0.5)
EOSINOPHIL NFR BLD AUTO: 0 % — SIGNIFICANT CHANGE UP (ref 0–6)
GLUCOSE SERPL-MCNC: 95 MG/DL — SIGNIFICANT CHANGE UP (ref 70–115)
HCT VFR BLD CALC: 26.9 % — LOW (ref 37–47)
HGB BLD-MCNC: 9 G/DL — LOW (ref 12–16)
INR BLD: 0.95 RATIO — SIGNIFICANT CHANGE UP (ref 0.88–1.16)
LYMPHOCYTES # BLD AUTO: 0.8 K/UL — LOW (ref 1–4.8)
LYMPHOCYTES # BLD AUTO: 19.3 % — LOW (ref 20–55)
MCHC RBC-ENTMCNC: 32.7 PG — HIGH (ref 27–31)
MCHC RBC-ENTMCNC: 33.5 G/DL — SIGNIFICANT CHANGE UP (ref 32–36)
MCV RBC AUTO: 97.8 FL — SIGNIFICANT CHANGE UP (ref 81–99)
MONOCYTES # BLD AUTO: 0.3 K/UL — SIGNIFICANT CHANGE UP (ref 0–0.8)
MONOCYTES NFR BLD AUTO: 7.9 % — SIGNIFICANT CHANGE UP (ref 3–10)
NEUTROPHILS # BLD AUTO: 2.8 K/UL — SIGNIFICANT CHANGE UP (ref 1.8–8)
NEUTROPHILS NFR BLD AUTO: 72.5 % — SIGNIFICANT CHANGE UP (ref 37–73)
NT-PROBNP SERPL-SCNC: 2537 PG/ML — HIGH (ref 0–300)
PLATELET # BLD AUTO: 145 K/UL — LOW (ref 150–400)
POTASSIUM SERPL-MCNC: 3.3 MMOL/L — LOW (ref 3.5–5.3)
POTASSIUM SERPL-SCNC: 3.3 MMOL/L — LOW (ref 3.5–5.3)
PROT SERPL-MCNC: 5.4 G/DL — LOW (ref 6.6–8.7)
PROTHROM AB SERPL-ACNC: 10.4 SEC — SIGNIFICANT CHANGE UP (ref 9.8–12.7)
RBC # BLD: 2.75 M/UL — LOW (ref 4.4–5.2)
RBC # FLD: 15.3 % — SIGNIFICANT CHANGE UP (ref 11–15.6)
SODIUM SERPL-SCNC: 140 MMOL/L — SIGNIFICANT CHANGE UP (ref 135–145)
TROPONIN T SERPL-MCNC: <0.01 NG/ML — SIGNIFICANT CHANGE UP (ref 0–0.06)
WBC # BLD: 3.9 K/UL — LOW (ref 4.8–10.8)
WBC # FLD AUTO: 3.9 K/UL — LOW (ref 4.8–10.8)

## 2018-02-20 PROCEDURE — 93010 ELECTROCARDIOGRAM REPORT: CPT

## 2018-02-20 PROCEDURE — 99285 EMERGENCY DEPT VISIT HI MDM: CPT

## 2018-02-20 PROCEDURE — 70450 CT HEAD/BRAIN W/O DYE: CPT | Mod: 26

## 2018-02-20 PROCEDURE — 99223 1ST HOSP IP/OBS HIGH 75: CPT

## 2018-02-20 PROCEDURE — 71045 X-RAY EXAM CHEST 1 VIEW: CPT | Mod: 26

## 2018-02-20 RX ORDER — SODIUM CHLORIDE 9 MG/ML
3 INJECTION INTRAMUSCULAR; INTRAVENOUS; SUBCUTANEOUS ONCE
Qty: 0 | Refills: 0 | Status: COMPLETED | OUTPATIENT
Start: 2018-02-20 | End: 2018-02-20

## 2018-02-20 RX ADMIN — SODIUM CHLORIDE 3 MILLILITER(S): 9 INJECTION INTRAMUSCULAR; INTRAVENOUS; SUBCUTANEOUS at 23:36

## 2018-02-20 NOTE — ED PROVIDER NOTE - CARE PLAN
Principal Discharge DX:	Lung cancer  Secondary Diagnosis:	Weakness  Secondary Diagnosis:	Dehydration

## 2018-02-20 NOTE — ED ADULT TRIAGE NOTE - CHIEF COMPLAINT QUOTE
Pt sent in from [facility] for blurred vision and increasing weakness. Pt not receiving chemo at this moment being seen at [facility]'s for hydration. Pt sent in from [facility] for blurred vision and increasing weakness. Pt has hx of lung ca. Pt not receiving chemo at this moment being seen at [facility] for hydration.

## 2018-02-20 NOTE — ED ADULT NURSE NOTE - CHIEF COMPLAINT QUOTE
Pt sent in from [facility] for blurred vision and increasing weakness. Pt has hx of lung ca. Pt not receiving chemo at this moment being seen at [facility] for hydration.

## 2018-02-20 NOTE — H&P ADULT - NSHPPHYSICALEXAM_GEN_ALL_CORE
Vital Signs Last 24 Hrs  T(C): 36.8 (20 Feb 2018 22:23), Max: 36.8 (20 Feb 2018 22:23)  T(F): 98.3 (20 Feb 2018 22:23), Max: 98.3 (20 Feb 2018 22:23)  HR: 58 (20 Feb 2018 22:23) (58 - 72)  BP: 155/80 (20 Feb 2018 22:23) (147/91 - 155/80)  RR: 20 (20 Feb 2018 22:23) (16 - 20)  SpO2: 100% (20 Feb 2018 22:23) (98% - 100%)    Constitutional/General: Well developed, well nourished, vitals reviewed  EYE: PERRL, conjunctiva clear, ecchymosis over left eye  ENT: Good dentition, oropharynx clear  NECK: No masses, thyroid normal  RESP: CTAB, no wheezes, no rhonchi, normal effort  CV: RRR, normal S1S2, no murmur, 2+ DP pulses, no JVD, 2+ bilateral LE pitting edema, chemo-port noted.   ABDOMEN: Soft, nontender, no HSM  LYMPH: No cervical or supraclavicular adenopathy  SKIN: Warm, dry, no rashes, bilateral shin ecchymosis   PSYCHIATRIC: Oriented x3, normal mood and affect

## 2018-02-20 NOTE — ED ADULT NURSE NOTE - OBJECTIVE STATEMENT
Pt sent in from [facility] for blurred vision and increasing weakness. Pt has hx of lung ca. Pt not receiving chemo at this moment being seen at [facility] for hydration.

## 2018-02-20 NOTE — H&P ADULT - ASSESSMENT
64 y/o female with significant co-morbid condition of lung cancer with mets to brain, completed chemotherapy and radiation, on review of chart no further treatment from oncology standpoint. patient was refusing palliative intervention, still currently refusing. patient presentation of mild blurred vision and generalized weakness suspected secondary to underlying malignancy. Head CT: unchanged from 1/30/18. patient is more concerned about generalized weakness. She appears to be appears to be in denial of current condition.     Admit to medical unit   OOB with assistance  regular diet with supplement  patient advised about benefits of palliative/hospice services, she refused for them to be consulted at this time      MERCEDES with hypokalemia - poor oral intake and ACEI  - gentle IV hydration with potassium supplementation  - held ACEI   - f/u repeat potassium and monitor renal function    HTN  resume home medication  - atenolol interval changed from twice daily to once daily based on renal function     CAD status post stent  resume plavix, aspirin    H/O Anemia - currently above baseline, no acute bleeding, no need for transfusion, cont to monitor, cont folic acid  DVT prophylaxis 64 y/o female with significant co-morbid condition of lung cancer with mets to brain, completed chemotherapy and radiation, on review of chart no further treatment from oncology standpoint. patient was refusing palliative intervention, still currently refusing. patient presentation of mild blurred vision and generalized weakness suspected secondary to underlying malignancy. Head CT: unchanged from 1/30/18. patient is more concerned about generalized weakness. She appears to be appears to be in denial of current condition.     Admit to medical unit   OOB with assistance  regular diet with supplement  patient advised about benefits of palliative/hospice services, she refused for them to be consulted at this time      MERCEDES with hypokalemia - poor oral intake and ACEI  - gentle IV hydration with potassium supplementation  - held ACEI   - f/u repeat potassium and monitor renal function    HTN  resume home medication  - atenolol interval changed from twice daily to once daily based on renal function     CAD status post stent  resume plavix, aspirin    H/O Anemia - currently above baseline, no acute bleeding, no need for transfusion, cont to monitor, cont folic acid    H/O Anxiety and depression cont Ativan  DVT prophylaxis

## 2018-02-20 NOTE — H&P ADULT - HISTORY OF PRESENT ILLNESS
61 yo F with h/o HTN, CAD s/p PCI with 5 stents (2008) on plavix,, metastatic lung cA with brain mets s/p radiation therapy (last 8/17) and chemotherapy (11/17), discharged from [facility] on Feb 9, 2018 s/p treatment for UTI and dehydration secondary nausea and vomiting. She presented with complaints of Blurred vision and increased weakness. Pt indicate that although her nausea and vomiting has subsided she has no appetite, she does not use any nutritional supplements. Denies HA, CP, SOB (compared to baseline), or abdominal pain. Although pt states she can see everything. No motor sensory loss or fever/chills. No further complaints at this time.  · Presenting Symptoms: + visual disturbance, fatigue, generalized weakness  · Negative Findings: no fever, chills, HA, CP, SOB  · Timing: sudden onset  · Duration: today  · Context: unknown  · Relieving Factors: none

## 2018-02-20 NOTE — ED PROVIDER NOTE - OBJECTIVE STATEMENT
64 y/o F with PMHx of lung cancer on chemotherapy and multiple medications presents to the ED c/o blurred vision/visual disturbance, fatigue and generalized weakness onset today. Was sent by [facility] for abnormal blood test. Pt does not know the blood test results that [facility] sent her for. No HA, CP, no SOB (compared to baseline), or abdominal pain. Although pt states she can see everything. No motor sensory loss or fever/chills. No further complaints at this time.

## 2018-02-21 DIAGNOSIS — N17.9 ACUTE KIDNEY FAILURE, UNSPECIFIED: ICD-10-CM

## 2018-02-21 DIAGNOSIS — R53.81 OTHER MALAISE: ICD-10-CM

## 2018-02-21 DIAGNOSIS — H53.8 OTHER VISUAL DISTURBANCES: ICD-10-CM

## 2018-02-21 DIAGNOSIS — C34.90 MALIGNANT NEOPLASM OF UNSPECIFIED PART OF UNSPECIFIED BRONCHUS OR LUNG: ICD-10-CM

## 2018-02-21 DIAGNOSIS — E43 UNSPECIFIED SEVERE PROTEIN-CALORIE MALNUTRITION: ICD-10-CM

## 2018-02-21 LAB
ANION GAP SERPL CALC-SCNC: 13 MMOL/L — SIGNIFICANT CHANGE UP (ref 5–17)
BUN SERPL-MCNC: 17 MG/DL — SIGNIFICANT CHANGE UP (ref 8–20)
CALCIUM SERPL-MCNC: 9.4 MG/DL — SIGNIFICANT CHANGE UP (ref 8.6–10.2)
CHLORIDE SERPL-SCNC: 107 MMOL/L — SIGNIFICANT CHANGE UP (ref 98–107)
CO2 SERPL-SCNC: 20 MMOL/L — LOW (ref 22–29)
CREAT SERPL-MCNC: 2.02 MG/DL — HIGH (ref 0.5–1.3)
GLUCOSE SERPL-MCNC: 81 MG/DL — SIGNIFICANT CHANGE UP (ref 70–115)
HCT VFR BLD CALC: 26.7 % — LOW (ref 37–47)
HGB BLD-MCNC: 8.9 G/DL — LOW (ref 12–16)
MCHC RBC-ENTMCNC: 32.4 PG — HIGH (ref 27–31)
MCHC RBC-ENTMCNC: 33.3 G/DL — SIGNIFICANT CHANGE UP (ref 32–36)
MCV RBC AUTO: 97.1 FL — SIGNIFICANT CHANGE UP (ref 81–99)
PLATELET # BLD AUTO: 144 K/UL — LOW (ref 150–400)
POTASSIUM SERPL-MCNC: 2.9 MMOL/L — CRITICAL LOW (ref 3.5–5.3)
POTASSIUM SERPL-SCNC: 2.9 MMOL/L — CRITICAL LOW (ref 3.5–5.3)
RBC # BLD: 2.75 M/UL — LOW (ref 4.4–5.2)
RBC # FLD: 15.4 % — SIGNIFICANT CHANGE UP (ref 11–15.6)
SODIUM SERPL-SCNC: 140 MMOL/L — SIGNIFICANT CHANGE UP (ref 135–145)
WBC # BLD: 3.2 K/UL — LOW (ref 4.8–10.8)
WBC # FLD AUTO: 3.2 K/UL — LOW (ref 4.8–10.8)

## 2018-02-21 PROCEDURE — 70551 MRI BRAIN STEM W/O DYE: CPT | Mod: 26

## 2018-02-21 PROCEDURE — 99233 SBSQ HOSP IP/OBS HIGH 50: CPT

## 2018-02-21 RX ORDER — ATENOLOL 25 MG/1
50 TABLET ORAL
Qty: 0 | Refills: 0 | Status: DISCONTINUED | OUTPATIENT
Start: 2018-02-21 | End: 2018-02-21

## 2018-02-21 RX ORDER — CLOPIDOGREL BISULFATE 75 MG/1
75 TABLET, FILM COATED ORAL DAILY
Qty: 0 | Refills: 0 | Status: DISCONTINUED | OUTPATIENT
Start: 2018-02-21 | End: 2018-02-24

## 2018-02-21 RX ORDER — POTASSIUM CHLORIDE 20 MEQ
10 PACKET (EA) ORAL
Qty: 0 | Refills: 0 | Status: COMPLETED | OUTPATIENT
Start: 2018-02-21 | End: 2018-02-22

## 2018-02-21 RX ORDER — HYDRALAZINE HCL 50 MG
10 TABLET ORAL EVERY 6 HOURS
Qty: 0 | Refills: 0 | Status: DISCONTINUED | OUTPATIENT
Start: 2018-02-21 | End: 2018-02-24

## 2018-02-21 RX ORDER — SODIUM CHLORIDE 9 MG/ML
1000 INJECTION, SOLUTION INTRAVENOUS
Qty: 0 | Refills: 0 | Status: DISCONTINUED | OUTPATIENT
Start: 2018-02-21 | End: 2018-02-23

## 2018-02-21 RX ORDER — FOLIC ACID 0.8 MG
1 TABLET ORAL DAILY
Qty: 0 | Refills: 0 | Status: DISCONTINUED | OUTPATIENT
Start: 2018-02-21 | End: 2018-02-24

## 2018-02-21 RX ORDER — ASPIRIN/CALCIUM CARB/MAGNESIUM 324 MG
81 TABLET ORAL DAILY
Qty: 0 | Refills: 0 | Status: DISCONTINUED | OUTPATIENT
Start: 2018-02-21 | End: 2018-02-24

## 2018-02-21 RX ORDER — HYDRALAZINE HCL 50 MG
25 TABLET ORAL EVERY 8 HOURS
Qty: 0 | Refills: 0 | Status: DISCONTINUED | OUTPATIENT
Start: 2018-02-21 | End: 2018-02-21

## 2018-02-21 RX ORDER — SODIUM CHLORIDE 9 MG/ML
1000 INJECTION, SOLUTION INTRAVENOUS
Qty: 0 | Refills: 0 | Status: COMPLETED | OUTPATIENT
Start: 2018-02-21 | End: 2018-02-21

## 2018-02-21 RX ORDER — ATENOLOL 25 MG/1
50 TABLET ORAL DAILY
Qty: 0 | Refills: 0 | Status: DISCONTINUED | OUTPATIENT
Start: 2018-02-21 | End: 2018-02-24

## 2018-02-21 RX ORDER — METOCLOPRAMIDE HCL 10 MG
10 TABLET ORAL
Qty: 0 | Refills: 0 | Status: DISCONTINUED | OUTPATIENT
Start: 2018-02-21 | End: 2018-02-22

## 2018-02-21 RX ORDER — DRONABINOL 2.5 MG
2.5 CAPSULE ORAL
Qty: 0 | Refills: 0 | Status: DISCONTINUED | OUTPATIENT
Start: 2018-02-21 | End: 2018-02-24

## 2018-02-21 RX ORDER — AMLODIPINE BESYLATE 2.5 MG/1
2.5 TABLET ORAL DAILY
Qty: 0 | Refills: 0 | Status: DISCONTINUED | OUTPATIENT
Start: 2018-02-21 | End: 2018-02-23

## 2018-02-21 RX ORDER — ENOXAPARIN SODIUM 100 MG/ML
30 INJECTION SUBCUTANEOUS EVERY 24 HOURS
Qty: 0 | Refills: 0 | Status: DISCONTINUED | OUTPATIENT
Start: 2018-02-21 | End: 2018-02-24

## 2018-02-21 RX ORDER — ONDANSETRON 8 MG/1
4 TABLET, FILM COATED ORAL EVERY 4 HOURS
Qty: 0 | Refills: 0 | Status: DISCONTINUED | OUTPATIENT
Start: 2018-02-21 | End: 2018-02-24

## 2018-02-21 RX ADMIN — AMLODIPINE BESYLATE 2.5 MILLIGRAM(S): 2.5 TABLET ORAL at 05:13

## 2018-02-21 RX ADMIN — Medication 10 MILLIGRAM(S): at 18:23

## 2018-02-21 RX ADMIN — Medication 2.5 MILLIGRAM(S): at 05:12

## 2018-02-21 RX ADMIN — Medication 0.25 MILLIGRAM(S): at 05:34

## 2018-02-21 RX ADMIN — Medication 2.5 MILLIGRAM(S): at 17:58

## 2018-02-21 RX ADMIN — CLOPIDOGREL BISULFATE 75 MILLIGRAM(S): 75 TABLET, FILM COATED ORAL at 13:40

## 2018-02-21 RX ADMIN — Medication 1 MILLIGRAM(S): at 13:40

## 2018-02-21 RX ADMIN — ENOXAPARIN SODIUM 30 MILLIGRAM(S): 100 INJECTION SUBCUTANEOUS at 05:13

## 2018-02-21 RX ADMIN — SODIUM CHLORIDE 85 MILLILITER(S): 9 INJECTION, SOLUTION INTRAVENOUS at 03:21

## 2018-02-21 RX ADMIN — Medication 10 MILLIGRAM(S): at 21:41

## 2018-02-21 RX ADMIN — ATENOLOL 50 MILLIGRAM(S): 25 TABLET ORAL at 05:13

## 2018-02-21 RX ADMIN — Medication 81 MILLIGRAM(S): at 13:40

## 2018-02-21 NOTE — PROGRESS NOTE ADULT - SUBJECTIVE AND OBJECTIVE BOX
seen for weakness, MERCEDES    complaining of blurred vision (near sighted at baseline)  denies dysuria. poor appetite and gen weakness  ROS otherwise negative     MEDICATIONS  (STANDING):  amLODIPine   Tablet 2.5 milliGRAM(s) Oral daily  aspirin enteric coated 81 milliGRAM(s) Oral daily  ATENolol  Tablet 50 milliGRAM(s) Oral daily  clopidogrel Tablet 75 milliGRAM(s) Oral daily  dronabinol 2.5 milliGRAM(s) Oral two times a day  enoxaparin Injectable 30 milliGRAM(s) SubCutaneous every 24 hours  folic acid 1 milliGRAM(s) Oral daily  lactated ringers 1000 milliLiter(s) (100 mL/Hr) IV Continuous <Continuous>  LORazepam   Injectable 1 milliGRAM(s) IV Push once  metoclopramide 10 milliGRAM(s) Oral Before meals and at bedtime    MEDICATIONS  (PRN):  LORazepam    Concentrate 0.25 milliGRAM(s) SubLingual every 6 hours PRN Agitation  ondansetron   Disintegrating Tablet 4 milliGRAM(s) Oral every 4 hours PRN Nausea and/or Vomiting      Allergies    codeine (Unknown)  penicillin (Unknown)    Vital Signs Last 24 Hrs  T(C): 36.4 (21 Feb 2018 05:09), Max: 36.8 (20 Feb 2018 22:23)  T(F): 97.6 (21 Feb 2018 05:09), Max: 98.3 (20 Feb 2018 22:23)  HR: 60 (21 Feb 2018 05:09) (58 - 72)  BP: 151/71 (21 Feb 2018 05:09) (144/71 - 155/80)  BP(mean): --  RR: 18 (21 Feb 2018 05:09) (16 - 20)  SpO2: 100% (21 Feb 2018 05:09) (98% - 100%)    PHYSICAL EXAM:    GENERAL: NAD frail  CHEST/LUNG: ctab  HEART: Regular rate and rhythm; S1 S2  ABDOMEN: Soft, Nontender, Nondistended; Bowel sounds present  EXTREMITIES:  no edema.   NERVOUS SYSTEM:  Alert & Oriented X3, gen weakness, cn2-12 grossly intact.       LABS:                        9.0    3.9   )-----------( 145      ( 20 Feb 2018 20:44 )             26.9     02-20    140  |  106  |  20.0  ----------------------------<  95  3.3<L>   |  19.0<L>  |  1.99<H>    Ca    9.1      20 Feb 2018 20:44    TPro  5.4<L>  /  Alb  2.9<L>  /  TBili  0.3<L>  /  DBili  x   /  AST  14  /  ALT  12  /  AlkPhos  82  02-20    PT/INR - ( 20 Feb 2018 20:44 )   PT: 10.4 sec;   INR: 0.95 ratio         PTT - ( 20 Feb 2018 20:44 )  PTT:25.2 sec      CAPILLARY BLOOD GLUCOSE            RADIOLOGY & ADDITIONAL TESTS:

## 2018-02-21 NOTE — PROGRESS NOTE ADULT - ASSESSMENT
64 y/o female with significant co-morbid condition of lung cancer with mets to brain, completed chemotherapy and radiation, on review of chart no further treatment from oncology standpoint presents with blurred vision and gen weakness.   Patient declines palliative intervention and seems to be in denial of her current condition.

## 2018-02-22 DIAGNOSIS — I67.1 CEREBRAL ANEURYSM, NONRUPTURED: ICD-10-CM

## 2018-02-22 LAB
ANION GAP SERPL CALC-SCNC: 14 MMOL/L — SIGNIFICANT CHANGE UP (ref 5–17)
APPEARANCE UR: CLEAR — SIGNIFICANT CHANGE UP
BILIRUB UR-MCNC: NEGATIVE — SIGNIFICANT CHANGE UP
BUN SERPL-MCNC: 15 MG/DL — SIGNIFICANT CHANGE UP (ref 8–20)
CALCIUM SERPL-MCNC: 9.3 MG/DL — SIGNIFICANT CHANGE UP (ref 8.6–10.2)
CHLORIDE SERPL-SCNC: 108 MMOL/L — HIGH (ref 98–107)
CO2 SERPL-SCNC: 19 MMOL/L — LOW (ref 22–29)
COLOR SPEC: YELLOW — SIGNIFICANT CHANGE UP
CREAT SERPL-MCNC: 1.86 MG/DL — HIGH (ref 0.5–1.3)
DIFF PNL FLD: ABNORMAL
GLUCOSE SERPL-MCNC: 61 MG/DL — LOW (ref 70–115)
GLUCOSE UR QL: NEGATIVE MG/DL — SIGNIFICANT CHANGE UP
HCT VFR BLD CALC: 25 % — LOW (ref 37–47)
HGB BLD-MCNC: 8.3 G/DL — LOW (ref 12–16)
KETONES UR-MCNC: NEGATIVE — SIGNIFICANT CHANGE UP
LEUKOCYTE ESTERASE UR-ACNC: NEGATIVE — SIGNIFICANT CHANGE UP
MCHC RBC-ENTMCNC: 32.7 PG — HIGH (ref 27–31)
MCHC RBC-ENTMCNC: 33.2 G/DL — SIGNIFICANT CHANGE UP (ref 32–36)
MCV RBC AUTO: 98.4 FL — SIGNIFICANT CHANGE UP (ref 81–99)
NITRITE UR-MCNC: NEGATIVE — SIGNIFICANT CHANGE UP
PH UR: 7 — SIGNIFICANT CHANGE UP (ref 5–8)
PLATELET # BLD AUTO: 142 K/UL — LOW (ref 150–400)
POTASSIUM SERPL-MCNC: 3.8 MMOL/L — SIGNIFICANT CHANGE UP (ref 3.5–5.3)
POTASSIUM SERPL-SCNC: 3.8 MMOL/L — SIGNIFICANT CHANGE UP (ref 3.5–5.3)
PROT UR-MCNC: 30 MG/DL
RBC # BLD: 2.54 M/UL — LOW (ref 4.4–5.2)
RBC # FLD: 15.6 % — SIGNIFICANT CHANGE UP (ref 11–15.6)
RBC CASTS # UR COMP ASSIST: SIGNIFICANT CHANGE UP /HPF (ref 0–4)
SODIUM SERPL-SCNC: 141 MMOL/L — SIGNIFICANT CHANGE UP (ref 135–145)
SP GR SPEC: 1 — LOW (ref 1.01–1.02)
UROBILINOGEN FLD QL: NEGATIVE MG/DL — SIGNIFICANT CHANGE UP
WBC # BLD: 3.6 K/UL — LOW (ref 4.8–10.8)
WBC # FLD AUTO: 3.6 K/UL — LOW (ref 4.8–10.8)
WBC UR QL: SIGNIFICANT CHANGE UP

## 2018-02-22 PROCEDURE — 99233 SBSQ HOSP IP/OBS HIGH 50: CPT

## 2018-02-22 PROCEDURE — 99222 1ST HOSP IP/OBS MODERATE 55: CPT

## 2018-02-22 PROCEDURE — 70544 MR ANGIOGRAPHY HEAD W/O DYE: CPT | Mod: 26

## 2018-02-22 PROCEDURE — 76775 US EXAM ABDO BACK WALL LIM: CPT | Mod: 26

## 2018-02-22 RX ADMIN — AMLODIPINE BESYLATE 2.5 MILLIGRAM(S): 2.5 TABLET ORAL at 05:54

## 2018-02-22 RX ADMIN — SODIUM CHLORIDE 100 MILLILITER(S): 9 INJECTION, SOLUTION INTRAVENOUS at 05:53

## 2018-02-22 RX ADMIN — CLOPIDOGREL BISULFATE 75 MILLIGRAM(S): 75 TABLET, FILM COATED ORAL at 17:46

## 2018-02-22 RX ADMIN — ATENOLOL 50 MILLIGRAM(S): 25 TABLET ORAL at 05:54

## 2018-02-22 RX ADMIN — Medication 100 MILLIEQUIVALENT(S): at 02:02

## 2018-02-22 RX ADMIN — Medication 100 MILLIEQUIVALENT(S): at 03:13

## 2018-02-22 RX ADMIN — Medication 100 MILLIEQUIVALENT(S): at 04:16

## 2018-02-22 RX ADMIN — ENOXAPARIN SODIUM 30 MILLIGRAM(S): 100 INJECTION SUBCUTANEOUS at 04:17

## 2018-02-22 RX ADMIN — Medication 2.5 MILLIGRAM(S): at 06:24

## 2018-02-22 RX ADMIN — Medication 1 MILLIGRAM(S): at 17:46

## 2018-02-22 RX ADMIN — SODIUM CHLORIDE 100 MILLILITER(S): 9 INJECTION, SOLUTION INTRAVENOUS at 07:46

## 2018-02-22 RX ADMIN — SODIUM CHLORIDE 100 MILLILITER(S): 9 INJECTION, SOLUTION INTRAVENOUS at 21:49

## 2018-02-22 RX ADMIN — Medication 2.5 MILLIGRAM(S): at 17:46

## 2018-02-22 RX ADMIN — Medication 81 MILLIGRAM(S): at 17:46

## 2018-02-22 RX ADMIN — Medication 10 MILLIGRAM(S): at 06:24

## 2018-02-22 NOTE — CONSULT NOTE ADULT - SUBJECTIVE AND OBJECTIVE BOX
HPI: This is a 63yoF readmitted approximately 2 weeks after being discharged from [facility] to UTI and Dehydration. PMH of Metastatic Lung Cancer with Brain lesion and aneurysm.  CC: Blurred vision, Very weak poor appetite and oral intake-intermittent nausea, fell at home has ecchymotic L eye and forehead laceration from that fall.  She refuses to use a walker, or discuss goals of care, nor  establishing a HCP. Still refusing to speak with Hospice about providing more support at home.    61 yo F with h/o HTN, CAD s/p PCI with 5 stents () on plavix,, metastatic lung cA with brain mets s/p radiation therapy (last ) and chemotherapy (), discharged from [facility] on 2018 s/p treatment for UTI and dehydration secondary nausea and vomiting. She presented with complaints of Blurred vision and increased weakness. Pt indicate that although her nausea and vomiting has subsided she has no appetite, she does not use any nutritional supplements. Denies HA, CP, SOB (compared to baseline), or abdominal pain. Although pt states she can see everything. No motor sensory loss or fever/chills. No further complaints at this time.  · Presenting Symptoms: + visual disturbance, fatigue, generalized weakness  · Negative Findings: no fever, chills, HA, CP, SOB  · Timing: sudden onset  · Duration: today  · Context: unknown  · Relieving Factors: none (2018 22:50)    PERTINENT PMH REVIEWED: Yes    PAST MEDICAL & SURGICAL HISTORY:  Lung cancer: with mets to brain  Hypertension  S/P cardiac catheterization: 5 cardiac stents  No significant past surgical history      SOCIAL HISTORY:  EtOH    No                                    Drugs    No                                      nonsmoker                                    Admitted from: home  Lives with daughter -No designated HCP as yet.    FAMILY HISTORY:  No pertinent family history in first degree relatives    Allergies  codeine (Unknown)  penicillin (Unknown)    Baseline ADLs (prior to admission):  Independent      Present Symptoms:   Dyspnea:   2 MONSON  Nausea/Yes   Anxiety:  Yes   Depression: Yes   Fatigue: Yes   Loss of appetite: Yes     Pain: Denies            Character-            Duration-            Effect-            Factors-            Frequency-            Location-            Severity-    Review of Systems: Reviewed                       All others negative    MEDICATIONS  (STANDING):  amLODIPine   Tablet 2.5 milliGRAM(s) Oral daily  aspirin enteric coated 81 milliGRAM(s) Oral daily  ATENolol  Tablet 50 milliGRAM(s) Oral daily  clopidogrel Tablet 75 milliGRAM(s) Oral daily  dronabinol 2.5 milliGRAM(s) Oral two times a day  enoxaparin Injectable 30 milliGRAM(s) SubCutaneous every 24 hours  folic acid 1 milliGRAM(s) Oral daily  lactated ringers 1000 milliLiter(s) (100 mL/Hr) IV Continuous <Continuous>  LORazepam    Concentrate 0.25 milliGRAM(s) SubLingual every 12 hours  LORazepam   Injectable 1 milliGRAM(s) IV Push once    MEDICATIONS  (PRN):  hydrALAZINE Injectable 10 milliGRAM(s) IV Push every 6 hours PRN SBP>170  LORazepam    Concentrate 0.25 milliGRAM(s) SubLingual every 6 hours PRN Agitation  ondansetron   Disintegrating Tablet 4 milliGRAM(s) Oral every 4 hours PRN Nausea and/or Vomiting      PHYSICAL EXAM:    Vital Signs Last 24 Hrs  T(C): 36.6 (2018 09:18), Max: 36.8 (2018 18:22)  T(F): 97.9 (2018 09:18), Max: 98.3 (2018 18:22)  HR: 63 (2018 09:18) (60 - 81)  BP: 152/86 (2018 09:18) (105/58 - 176/89)  BP(mean): --  RR: 18 (2018 09:18) (17 - 18)  SpO2: 98% (2018 09:18) (97% - 98%)    General: alert  oriented x _3___                  cachexia     HEENT: normal  dry mouth      Lungs: Clear no wheeze or cough    CV: normal      GI: normal                   constipation  last BM:     : normal  incontinent      MSK:   weakness  edema             ambulatory bed to chair to     Skin: normal    no rash    LABS:                        8.3    3.6   )-----------( 142      ( 2018 08:14 )             25.0     02-    141  |  108<H>  |  15.0  ----------------------------<  61<L>  3.8   |  19.0<L>  |  1.86<H>    Ca    9.3      2018 08:14    TPro  5.4<L>  /  Alb  2.9<L>  /  TBili  0.3<L>  /  DBili  x   /  AST  14  /  ALT  12  /  AlkPhos  82  02-20    PT/INR - ( 2018 20:44 )   PT: 10.4 sec;   INR: 0.95 ratio         PTT - ( 2018 20:44 )  PTT:25.2 sec  Urinalysis Basic - ( 2018 10:30 )    Color: Yellow / Appearance: Clear / S.005 / pH: x  Gluc: x / Ketone: Negative  / Bili: Negative / Urobili: Negative mg/dL   Blood: x / Protein: 30 mg/dL / Nitrite: Negative   Leuk Esterase: Negative / RBC: 0-2 /HPF / WBC 0-2   Sq Epi: x / Non Sq Epi: x / Bacteria: x    I&O's Summary    2018 07:01  -  2018 07:00  --------------------------------------------------------  IN: 0 mL / OUT: 1 mL / NET: -1 mL    RADIOLOGY & ADDITIONAL STUDIES:    ADVANCE DIRECTIVES:   DNR  NO  Completed on:                     SATHISH   NO   Completed on:  Living Will   NO   Completed on:

## 2018-02-22 NOTE — PROGRESS NOTE ADULT - ASSESSMENT
64 y/o female with significant co-morbid condition of lung cancer with mets to brain, completed chemotherapy and radiation, on review of chart no further treatment from oncology standpoint presents with blurred vision and gen weakness.   Patient declines palliative intervention and seems to be in denial of her current condition.  MRI head with possible left MCA aneurysm. MRA pending.  MERCEDES without improvement with IVF, renal ultrasound and ua pending. 64 y/o female with significant co-morbid condition of lung cancer with mets to brain, completed chemotherapy and radiation, on review of chart no further treatment from oncology standpoint presents with blurred vision and gen weakness.   Patient declines palliative intervention and seems to be in denial of her current condition.  MRI head with possible left MCA aneurysm. MRA pending.  MERCEDES without improvement with IVF, renal ultrasound and ua pending.    spoke with Dr Luna: [phone]--stable disease on recent imaging, s/p chemo months ago, s/p Whole brain xrt.  Recommends AMPARO for strengthening.    awaiting PT evaluation.

## 2018-02-22 NOTE — PHYSICAL THERAPY INITIAL EVALUATION ADULT - ADDITIONAL COMMENTS
pt states owns no DME and wants no DME, ambulates within home only, states primarily furniture walks around the home,  3 steps 1 rail to enter home,

## 2018-02-22 NOTE — CONSULT NOTE ADULT - ASSESSMENT
62 yo F with Metastatic Lung Cancer to Brain  Blurred vision ( possibly from Brain tumor  Acute Kidney Insufficiency>>Dehydration  Anxiety/Depression  Anorexia /Nausea /Malnourished   Severe Protein Malnutrition

## 2018-02-22 NOTE — PHYSICAL THERAPY INITIAL EVALUATION ADULT - GAIT PATTERN USED, PT EVAL
decreased activity tolerance and gait velocity, distant S for safety due to mild unsteadiness c turns

## 2018-02-22 NOTE — PHYSICAL THERAPY INITIAL EVALUATION ADULT - PERTINENT HX OF CURRENT PROBLEM, REHAB EVAL
pt presents to [facility] due to blurry vision, weakness, hx of lung cancer c mets to brain, right parietal lesion, left lung lesion,left iliac lesion, right renal lesion,

## 2018-02-22 NOTE — PROGRESS NOTE ADULT - SUBJECTIVE AND OBJECTIVE BOX
seen for gen weakness, sowmya    complaining of blurred vision.   no dysuria. no cp/sob  ROS otherwise negative.  gen weakness persists.    MEDICATIONS  (STANDING):  amLODIPine   Tablet 2.5 milliGRAM(s) Oral daily  aspirin enteric coated 81 milliGRAM(s) Oral daily  ATENolol  Tablet 50 milliGRAM(s) Oral daily  clopidogrel Tablet 75 milliGRAM(s) Oral daily  dronabinol 2.5 milliGRAM(s) Oral two times a day  enoxaparin Injectable 30 milliGRAM(s) SubCutaneous every 24 hours  folic acid 1 milliGRAM(s) Oral daily  lactated ringers 1000 milliLiter(s) (100 mL/Hr) IV Continuous <Continuous>  LORazepam   Injectable 1 milliGRAM(s) IV Push once  metoclopramide 10 milliGRAM(s) Oral Before meals and at bedtime    MEDICATIONS  (PRN):  hydrALAZINE Injectable 10 milliGRAM(s) IV Push every 6 hours PRN SBP>170  LORazepam    Concentrate 0.25 milliGRAM(s) SubLingual every 6 hours PRN Agitation  ondansetron   Disintegrating Tablet 4 milliGRAM(s) Oral every 4 hours PRN Nausea and/or Vomiting      Allergies    codeine (Unknown)  penicillin (Unknown)    Vital Signs Last 24 Hrs  T(C): 36.7 (22 Feb 2018 05:51), Max: 36.8 (21 Feb 2018 18:22)  T(F): 98 (22 Feb 2018 05:51), Max: 98.3 (21 Feb 2018 18:22)  HR: 61 (22 Feb 2018 05:51) (59 - 81)  BP: 155/91 (22 Feb 2018 05:51) (105/58 - 176/89)  BP(mean): --  RR: 18 (22 Feb 2018 05:51) (17 - 18)  SpO2: 97% (22 Feb 2018 05:51) (97% - 98%)    PHYSICAL EXAM:    GENERAL: NAD frail  CHEST/LUNG: Clear to percussion bilaterally  HEART: Regular rate and rhythm; S1 S2  ABDOMEN: Soft, Nontender, Nondistended; Bowel sounds present  EXTREMITIES:  no edema   NERVOUS SYSTEM:  Alert & Oriented X3, nonfocal    LABS:                        8.9    3.2   )-----------( 144      ( 21 Feb 2018 21:14 )             26.7     02-22    141  |  108<H>  |  15.0  ----------------------------<  61<L>  3.8   |  19.0<L>  |  1.86<H>    Ca    9.3      22 Feb 2018 08:14    TPro  5.4<L>  /  Alb  2.9<L>  /  TBili  0.3<L>  /  DBili  x   /  AST  14  /  ALT  12  /  AlkPhos  82  02-20    PT/INR - ( 20 Feb 2018 20:44 )   PT: 10.4 sec;   INR: 0.95 ratio         PTT - ( 20 Feb 2018 20:44 )  PTT:25.2 sec      CAPILLARY BLOOD GLUCOSE            RADIOLOGY & ADDITIONAL TESTS:

## 2018-02-22 NOTE — CONSULT NOTE ADULT - ATTENDING COMMENTS
COUNSELING:    Face to face meeting to discuss Advanced Care Planning - Time Spent ______ Minutes.  See goals of care note.    More than 50% time spent in counseling and coordinating care. __25____ Minutes.     Thank you for the opportunity to assist with the care of this patient.   [facility] Consult [facility] [phone].

## 2018-02-23 LAB
ANION GAP SERPL CALC-SCNC: 14 MMOL/L — SIGNIFICANT CHANGE UP (ref 5–17)
ANISOCYTOSIS BLD QL: SLIGHT — SIGNIFICANT CHANGE UP
BUN SERPL-MCNC: 10 MG/DL — SIGNIFICANT CHANGE UP (ref 8–20)
CALCIUM SERPL-MCNC: 8.9 MG/DL — SIGNIFICANT CHANGE UP (ref 8.6–10.2)
CHLORIDE SERPL-SCNC: 108 MMOL/L — HIGH (ref 98–107)
CO2 SERPL-SCNC: 19 MMOL/L — LOW (ref 22–29)
CREAT SERPL-MCNC: 1.74 MG/DL — HIGH (ref 0.5–1.3)
CULTURE RESULTS: NO GROWTH — SIGNIFICANT CHANGE UP
ELLIPTOCYTES BLD QL SMEAR: SLIGHT — SIGNIFICANT CHANGE UP
EOSINOPHIL NFR BLD AUTO: 4 % — SIGNIFICANT CHANGE UP (ref 0–5)
GLUCOSE SERPL-MCNC: 72 MG/DL — SIGNIFICANT CHANGE UP (ref 70–115)
HCT VFR BLD CALC: 22.1 % — LOW (ref 37–47)
HGB BLD-MCNC: 7.4 G/DL — LOW (ref 12–16)
HYPOCHROMIA BLD QL: SLIGHT — SIGNIFICANT CHANGE UP
LYMPHOCYTES # BLD AUTO: 53 % — SIGNIFICANT CHANGE UP (ref 20–55)
MACROCYTES BLD QL: SLIGHT — SIGNIFICANT CHANGE UP
MCHC RBC-ENTMCNC: 32.3 PG — HIGH (ref 27–31)
MCHC RBC-ENTMCNC: 33.5 G/DL — SIGNIFICANT CHANGE UP (ref 32–36)
MCV RBC AUTO: 96.5 FL — SIGNIFICANT CHANGE UP (ref 81–99)
MICROCYTES BLD QL: SLIGHT — SIGNIFICANT CHANGE UP
MONOCYTES NFR BLD AUTO: 12 % — HIGH (ref 3–10)
NEUTROPHILS NFR BLD AUTO: 31 % — LOW (ref 37–73)
OVALOCYTES BLD QL SMEAR: SLIGHT — SIGNIFICANT CHANGE UP
PLAT MORPH BLD: NORMAL — SIGNIFICANT CHANGE UP
PLATELET # BLD AUTO: 114 K/UL — LOW (ref 150–400)
POIKILOCYTOSIS BLD QL AUTO: SLIGHT — SIGNIFICANT CHANGE UP
POTASSIUM SERPL-MCNC: 3.6 MMOL/L — SIGNIFICANT CHANGE UP (ref 3.5–5.3)
POTASSIUM SERPL-SCNC: 3.6 MMOL/L — SIGNIFICANT CHANGE UP (ref 3.5–5.3)
RBC # BLD: 2.29 M/UL — LOW (ref 4.4–5.2)
RBC # FLD: 15.4 % — SIGNIFICANT CHANGE UP (ref 11–15.6)
RBC BLD AUTO: ABNORMAL
SODIUM SERPL-SCNC: 141 MMOL/L — SIGNIFICANT CHANGE UP (ref 135–145)
SPECIMEN SOURCE: SIGNIFICANT CHANGE UP
WBC # BLD: 2.7 K/UL — LOW (ref 4.8–10.8)
WBC # FLD AUTO: 2.7 K/UL — LOW (ref 4.8–10.8)

## 2018-02-23 PROCEDURE — 99233 SBSQ HOSP IP/OBS HIGH 50: CPT

## 2018-02-23 RX ORDER — AMLODIPINE BESYLATE 2.5 MG/1
5 TABLET ORAL DAILY
Qty: 0 | Refills: 0 | Status: DISCONTINUED | OUTPATIENT
Start: 2018-02-23 | End: 2018-02-24

## 2018-02-23 RX ADMIN — Medication 2.5 MILLIGRAM(S): at 17:09

## 2018-02-23 RX ADMIN — ENOXAPARIN SODIUM 30 MILLIGRAM(S): 100 INJECTION SUBCUTANEOUS at 05:15

## 2018-02-23 RX ADMIN — AMLODIPINE BESYLATE 5 MILLIGRAM(S): 2.5 TABLET ORAL at 17:10

## 2018-02-23 RX ADMIN — Medication 1 MILLIGRAM(S): at 12:21

## 2018-02-23 RX ADMIN — Medication 0.25 MILLIGRAM(S): at 21:42

## 2018-02-23 RX ADMIN — Medication 0.25 MILLIGRAM(S): at 08:52

## 2018-02-23 RX ADMIN — CLOPIDOGREL BISULFATE 75 MILLIGRAM(S): 75 TABLET, FILM COATED ORAL at 12:21

## 2018-02-23 RX ADMIN — ATENOLOL 50 MILLIGRAM(S): 25 TABLET ORAL at 05:15

## 2018-02-23 RX ADMIN — Medication 81 MILLIGRAM(S): at 12:21

## 2018-02-23 RX ADMIN — Medication 2.5 MILLIGRAM(S): at 05:15

## 2018-02-23 RX ADMIN — AMLODIPINE BESYLATE 2.5 MILLIGRAM(S): 2.5 TABLET ORAL at 05:15

## 2018-02-23 NOTE — CONSULT NOTE ADULT - ASSESSMENT
64 y/o female with a history of lung cancer with known R parietal cortical brain lesion s/p chemo and radiation. Currently on palliative care. Admitted with complaint of generalized weakness and blurry vision. MRI Brain showed stable brain metastases compared with December, 2017 with incidental finding of L MCA aneursym. MRA confirmed 6.2mm L MCAM2 segment bifurcation aneursym.    PLAN:        NOTE IS INCOMPLETE 64 y/o female with a history of lung cancer with known R parietal cortical brain lesion s/p chemo and radiation. Currently on palliative care with daily fluids per her oncologist. Admitted with complaint of generalized weakness and blurry vision without headaches or stiff neck, no exacerbating factors.  MRI Brain showed stable brain metastases compared with December, 2017 with incidental finding of L MCA aneursym. MRA confirmed 6.2mm L MCAM2 segment bifurcation aneursym.    PLAN:  Patient's aneurysm is distal and asymptomatic. She is not planned for any further treatment for her Stage IV lung cancer. Brain metastases are stable. No neurosurgical intervention is recommended.

## 2018-02-23 NOTE — CONSULT NOTE ADULT - SUBJECTIVE AND OBJECTIVE BOX
NOTE IS INCOMPLETE  HISTORY OF PRESENT ILLNESS:   Mrs. Cerrato is a 64 y/o female with a history of metastatic lung cancer (dx 6/2017) with metastases to the brain s/p radiation therapy (last 8/17) and chemotherapy (11/17), unable to tolerate and now on palliative care. Also with a history of CAD s/p PCI with 5 stents (2008) on Plavix, She has been admitted to [facility] several times since November for numerous reasons including FTT w/refractory nausea and vomiting and UTI.  She is admitted 2/20/17 for generalized weakness and blurry vision. Due to known brain metastases, MRI Brain obtained 2/21 showed a stable small R parietal cortical lesion compared with December MRI. There was also mention of a probably L MCA-M2 segment aneurysm; therefore, MRA was recommended which showed a 6mm L MCA bifurcation aneurysm without evidence of hemorrhage.   she underwent MRI of the brain to evaluate brain lesions (to r/o central emesis) which was stable compared with          was 2 months ago.  Denies fever, chills, pain, or cough.   Pt has taken steroids in the past, but does not like the side effects she feels from them. Treated at the [facility] for her cancer. Pt is  allergic to codeine and penicillin.    PAST MEDICAL & SURGICAL HISTORY:  Lung cancer: with mets to brain  Hypertension  S/P cardiac catheterization: 5 cardiac stents  No significant past surgical history    FAMILY HISTORY:      SOCIAL HISTORY:  Tobacco use x40 years, quit several months ago  Lives at home with daughter    Allergies  codeine (Unknown)  penicillin (Unknown)    REVIEW OF SYSTEMS  12pt ROS otherwise negative    MEDICATIONS:  Antibiotics:  Neuro:  dronabinol 2.5 milliGRAM(s) Oral two times a day  LORazepam    Concentrate 0.25 milliGRAM(s) SubLingual every 6 hours PRN  LORazepam    Concentrate 0.25 milliGRAM(s) SubLingual every 12 hours  LORazepam   Injectable 1 milliGRAM(s) IV Push once  ondansetron   Disintegrating Tablet 4 milliGRAM(s) Oral every 4 hours PRN  Anticoagulation:  aspirin enteric coated 81 milliGRAM(s) Oral daily  clopidogrel Tablet 75 milliGRAM(s) Oral daily  enoxaparin Injectable 30 milliGRAM(s) SubCutaneous every 24 hours  OTHER:  amLODIPine   Tablet 5 milliGRAM(s) Oral daily  ATENolol  Tablet 50 milliGRAM(s) Oral daily  hydrALAZINE Injectable 10 milliGRAM(s) IV Push every 6 hours PRN  IVF:  folic acid 1 milliGRAM(s) Oral daily      Vital Signs Last 24 Hrs  T(C): 36.7 (23 Feb 2018 13:13), Max: 36.9 (23 Feb 2018 04:10)  T(F): 98.1 (23 Feb 2018 13:13), Max: 98.4 (23 Feb 2018 04:10)  HR: 60 (23 Feb 2018 13:13) (52 - 63)  BP: 139/76 (23 Feb 2018 13:13) (124/70 - 148/78)  RR: 20 (23 Feb 2018 13:13) (18 - 20)  SpO2: 95% (23 Feb 2018 13:13) (95% - 98%)  PHYSICAL EXAM:  GENERAL: NAD, well-groomed, well-developed  HEAD:  Atraumatic, normocephalic  EYES: Conjunctiva and sclera clear  ENMT: moist mucous membranes, good dentition, no lesions  NECK: Supple, no JVD  MENTAL STATUS: AAO x3; Appropriately conversant without aphasia, following simple commands  CRANIAL NERVES: PERRL; EOMI; no facial asymmetry; facial sensation grossly intact to light touch b/l;  tongue midline  MOTOR: strength 5/5 B/L upper and lower extremities  COORDINATION: Pronator drift; gait intact; rapid alternating movements intact b/l upper extremities; no upper extremity dysmetria  SENSATION: grossly intact to light touch all extremities  MUSCLE STRETCH REFLEXES: DTRs 2+ intact and symmetric; no Arango's b/l; no clonus b/l  PLANTAR: upgoing/downgoing/mute (Babinski)  CHEST/LUNG: Clear to auscultation bilaterally  HEART: +S1/+S2; Regular rate and rhythm  ABDOMEN: Soft, nontender, nondistende  EXTREMITIES:  2+ peripheral pulses, no clubbing, cyanosis, or edema  SKIN: Warm, dry; no rashes or lesions    LABS:             7.4    2.7   )-----------( 114               22.1     141  |  108<H>  |  10.0  ----------------------------<  72  3.6   |  19.0<L>  |  1.74<H>    Ca    8.9       RADIOLOGY & ADDITIONAL STUDIES:  MR Angio Head No Cont (02.22.18 @ 16:19)  IMPRESSION: 6.2 mm left MCA M2 segment bifurcation aneurysm.    MR Head No Cont (02.21.18 @ 14:35)  IMPRESSION: Stable small right parietal lobe cortical lesion. No acute   infarction. Probable 5.5 mm left MCA M2 segment aneurysm. MRA is   recommended. HISTORY OF PRESENT ILLNESS:   Mrs. Cerrato is a 62 y/o female with a history of metastatic lung cancer (dx 6/2017) with metastases to the brain s/p radiation therapy (last 8/17) and chemotherapy (11/17), unable to tolerate and now on palliative care. Also with a history of CAD s/p PCI with 5 stents (2008) on Plavix, She has been admitted to [facility] several times since November for numerous reasons including FTT w/refractory nausea and vomiting and UTI.  She is admitted 2/20/17 for generalized weakness and blurry vision. Due to known brain metastases, MRI Brain obtained 2/21 showed a stable small R parietal cortical lesion compared with December MRI (December scan performed to r/o central nausea). MRI Brain showed incidental probable L MCA-M2 segment aneurysm; therefore, MRA was recommended which showed a 6mm L MCA bifurcation aneurysm without evidence of hemorrhage.   Patient reports blurry vision is in no visual field. It started about 4 days ago and has been stable since onset. Bilateral without exacerbating or relieving factors. Denies neck stiffness or head pain or pressure behind the eyes. Denies any double vision component, confirms "blurry" improved with distant vision. Nausea and vomiting has been under control with proper anti-emetics and daily fluids with Tracy and she is tolerating solids.     PAST MEDICAL & SURGICAL HISTORY:  Lung cancer: with mets to brain  Hypertension  S/P cardiac catheterization: 5 cardiac stents on Plavix    FAMILY HISTORY:  None reported    SOCIAL HISTORY:  Tobacco use x40 years reports maintaining 2-3 cigarettes per day  Lives at home with daughter    Allergies  codeine (Unknown)  penicillin (Unknown)    REVIEW OF SYSTEMS  12pt ROS otherwise negative    MEDICATIONS:  dronabinol 2.5 milliGRAM(s) Oral two times a day  LORazepam    Concentrate 0.25 milliGRAM(s) SubLingual every 6 hours PRN  LORazepam    Concentrate 0.25 milliGRAM(s) SubLingual every 12 hours  LORazepam   Injectable 1 milliGRAM(s) IV Push once  ondansetron   Disintegrating Tablet 4 milliGRAM(s) Oral every 4 hours PRN  Anticoagulation:  aspirin enteric coated 81 milliGRAM(s) Oral daily  clopidogrel Tablet 75 milliGRAM(s) Oral daily  enoxaparin Injectable 30 milliGRAM(s) SubCutaneous every 24 hours  OTHER:  amLODIPine   Tablet 5 milliGRAM(s) Oral daily  ATENolol  Tablet 50 milliGRAM(s) Oral daily  hydrALAZINE Injectable 10 milliGRAM(s) IV Push every 6 hours PRN  IVF:  folic acid 1 milliGRAM(s) Oral daily    Vital Signs Last 24 Hrs  T(C): 36.7 (23 Feb 2018 13:13), Max: 36.9 (23 Feb 2018 04:10)  T(F): 98.1 (23 Feb 2018 13:13), Max: 98.4 (23 Feb 2018 04:10)  HR: 60 (23 Feb 2018 13:13) (52 - 63)  BP: 139/76 (23 Feb 2018 13:13) (124/70 - 148/78)  RR: 20 (23 Feb 2018 13:13) (18 - 20)  SpO2: 95% (23 Feb 2018 13:13) (95% - 98%)  PHYSICAL EXAM:  GENERAL: NAD, well-groomed, frail  HEAD:  Atraumatic, normocephalic  EYES: Conjunctiva and sclera clear, no exophthalmos, vision is poor within 2 feet, improved at 6 feet   ENMT: moist mucous membranes, good dentition, no lesions  NECK: Supple, no JVD  MENTAL STATUS: AAO x3; Appropriately conversant without difficulty with word finding, following simple commands, concentration is good  CRANIAL NERVES: PERRL; EOMI; mild L diminished NLF with symmetric activation; facial sensation grossly intact to light touch b/l;  tongue midline  MOTOR: strength 5/5 B/L upper and lower extremities  COORDINATION: no upper extremity dysmetria bilaterally  SENSATION: grossly intact to light touch all extremities  PLANTAR: downgoing  CHEST/LUNG: Clear to auscultation bilaterally  HEART: +S1/+S2; Regular rate and rhythm  ABDOMEN: Soft, nontender, nondistended  EXTREMITIES:  2+ peripheral pulses  SKIN: Warm, dry; no rashes or lesions    LABS:             7.4    2.7   )-----------( 114               22.1     141  |  108<H>  |  10.0  ----------------------------<  72  3.6   |  19.0<L>  |  1.74<H>    Ca    8.9       RADIOLOGY & ADDITIONAL STUDIES:  MR Angio Head No Cont (02.22.18 @ 16:19)  IMPRESSION: 6.2 mm left MCA M2 segment bifurcation aneurysm.    MR Head No Cont (02.21.18 @ 14:35)  IMPRESSION: Stable small right parietal lobe cortical lesion. No acute   infarction. Probable 5.5 mm left MCA M2 segment aneurysm. MRA is   recommended.

## 2018-02-23 NOTE — DIETITIAN INITIAL EVALUATION ADULT. - OTHER INFO
Pt seen at bedside. Pt presents with lung cancer with mets to brain. Pt completed chemotherapy radiation and per oncology team, no further treatment to be done. Pt states having poor appetite and PO intake currently and PTA. Pt denies n/v. Pt states weight loss but not sure how much weight within 6 months time.

## 2018-02-23 NOTE — PROGRESS NOTE ADULT - ASSESSMENT
64 y/o female with significant co-morbid condition of lung cancer with mets to brain, completed chemotherapy and radiation, on review of chart no further treatment from oncology standpoint presents with blurred vision and gen weakness. Patient declines palliative intervention and seems to be in denial of her current condition.    spoke with Dr Luna: [phone]--stable disease on recent imaging, s/p chemo months ago, s/p Whole brain xrt.  Recommends AMPARO for strengthening.    awaiting PT evaluation.         Problem/Plan - 1:  ·  Problem: MERCEDES (acute kidney injury). improving     Problem/Plan - 2:  ·  Problem: Severe protein-calorie malnutrition.  Plan: supplements and marinol.      Problem/Plan - 3:  ·  Problem: Primary malignant neoplasm of lung metastatic to other site, unspecified laterality.  Plan: no planned chemo per previous documentation  palliative consulted.      Problem/Plan - 4:  ·  Problem: Blurred vision, bilateral.  Plan: MRI  outpatient optometry evaluation.      Problem/Plan - 5:  ·  Problem: Debility.  Plan: PT evaluation.      Problem/Plan - 6:  Problem: Brain aneurysm. Plan: MRA to confirm.    Attending Attestation: 64 y/o female with significant co-morbid condition of lung cancer with mets to brain, completed chemotherapy and radiation, on review of chart no further treatment from oncology standpoint presents with blurred vision and gen weakness. Patient declines palliative intervention and seems to be in denial of her current condition.    spoke with Dr Luna: [phone]--stable disease on recent imaging, s/p chemo months ago, s/p Whole brain xrt.  Recommends AMPARO for strengthening.    awaiting PT evaluation.         Problem/Plan - 1:  ·  Problem: MERCEDES (acute kidney injury). improving  --> refusing fluids  --> avoid nephrotoxic agents     Problem/Plan - 2:  ·  Problem: Severe protein-calorie malnutrition.  Plan: supplements and marinol.      Problem/Plan - 3:  ·  Problem: Primary malignant neoplasm of lung metastatic to other site, unspecified laterality.  Plan: no planned chemo per previous documentation  palliative following.   --> follow up with outpatient oncology      Problem/Plan - 4:  ·  Problem: Blurred vision, bilateral.   outpatient optometry evaluation.   --> mra head shows small aneurysm      Problem/Plan - 5:  ·  Problem: Debility.  Plan: PT evaluation noted  --> home when stable      Problem/Plan - 6:  Problem: Brain aneurysm. mra as above  --> spoke to neurosurgery     7) chronic blood loss anemia --> order guaic stool  --> will transfuse it continues to drop    8) thrombocytopenia --> avoid dvt prophylaxis      poor prognosis   palliative following  --> will reach out to family

## 2018-02-23 NOTE — CHART NOTE - NSCHARTNOTEFT_GEN_A_CORE
Upon Nutritional Assessment by the Registered Dietitian your patient was determined to meet criteria / has evidence of the following diagnosis/diagnoses:          [ ]  Mild Protein Calorie Malnutrition        [ ]  Moderate Protein Calorie Malnutrition        [X] Severe Protein Calorie Malnutrition        [ ] Unspecified Protein Calorie Malnutrition        [ ] Underweight / BMI <19        [ ] Morbid Obesity / BMI > 40      Findings as based on:  •  Comprehensive nutrition assessment and consultation  •  Calorie counts (nutrient intake analysis)  •  Food acceptance and intake status from observations by staff  •  Follow up  •  Patient education  •  Intervention secondary to interdisciplinary rounds  •   concerns      Treatment:    The following diet has been recommended:  1) Continue current diet order and supplementation   2) Monitor weight and PO intake    PROVIDER Section:     By signing this assessment you are acknowledging and agree with the diagnosis/diagnoses assigned by the Registered Dietitian    Comments:

## 2018-02-23 NOTE — PROGRESS NOTE ADULT - SUBJECTIVE AND OBJECTIVE BOX
DOMENIC CEBALLOS    [id number]    63y      Female    INTERVAL HPI/OVERNIGHT EVENTS:    patient being seen for metastatic lung Ca, MERCEDES, failure to thrive and med management. Patient seen at bedside and denies any acute complaints other than blurry vision and weakness.     last 24 hours patient is afebrile.        REVIEW OF SYSTEMS:    CONSTITUTIONAL: weakness   RESPIRATORY: No cough, wheezing, hemoptysis; No shortness of breath  CARDIOVASCULAR: No chest pain, palpitations  GASTROINTESTINAL: No abdominal or epigastric pain. No nausea, vomiting  NEUROLOGICAL: No headaches, memory loss, loss of strength.  MISCELLANEOUS:      Vital Signs Last 24 Hrs  T(C): 36.9 (2018 04:10), Max: 36.9 (2018 04:10)  T(F): 98.4 (2018 04:10), Max: 98.4 (2018 04:10)  HR: 55 (2018 04:10) (52 - 63)  BP: 148/78 (2018 04:10) (124/70 - 148/78)  BP(mean): --  RR: 19 (2018 04:10) (18 - 19)  SpO2: 98% (2018 21:11) (98% - 98%)    PHYSICAL EXAM:    GENERAL: NAD, cachetic   HEENT: PERRL, bruising over left eye   NECK: soft, Supple,  CHEST/LUNG: Clear to auscultation bilaterally; No wheezing  HEART: S1S2+, Regular rate and rhythm; No murmurs  ABDOMEN: Soft, Nontender,   EXTREMITIES:  2+ Peripheral Pulses, No edema  NEURO: AAOX3,   PSYCH: normal mood      LABS:                        7.4    2.7   )-----------( 114      ( 2018 08:21 )             22.1     02-23    141  |  108<H>  |  10.0  ----------------------------<  72  3.6   |  19.0<L>  |  1.74<H>    Ca    8.9      2018 08:21        Urinalysis Basic - ( 2018 10:30 )    Color: Yellow / Appearance: Clear / S.005 / pH: x  Gluc: x / Ketone: Negative  / Bili: Negative / Urobili: Negative mg/dL   Blood: x / Protein: 30 mg/dL / Nitrite: Negative   Leuk Esterase: Negative / RBC: 0-2 /HPF / WBC 0-2   Sq Epi: x / Non Sq Epi: x / Bacteria: x      MEDICATIONS  (STANDING):  amLODIPine   Tablet 2.5 milliGRAM(s) Oral daily  aspirin enteric coated 81 milliGRAM(s) Oral daily  ATENolol  Tablet 50 milliGRAM(s) Oral daily  clopidogrel Tablet 75 milliGRAM(s) Oral daily  dronabinol 2.5 milliGRAM(s) Oral two times a day  enoxaparin Injectable 30 milliGRAM(s) SubCutaneous every 24 hours  folic acid 1 milliGRAM(s) Oral daily  LORazepam    Concentrate 0.25 milliGRAM(s) SubLingual every 12 hours  LORazepam   Injectable 1 milliGRAM(s) IV Push once    MEDICATIONS  (PRN):  hydrALAZINE Injectable 10 milliGRAM(s) IV Push every 6 hours PRN SBP>170  LORazepam    Concentrate 0.25 milliGRAM(s) SubLingual every 6 hours PRN Agitation  ondansetron   Disintegrating Tablet 4 milliGRAM(s) Oral every 4 hours PRN Nausea and/or Vomiting      RADIOLOGY & ADDITIONAL TESTS:    mra head- IMPRESSION: 6.2 mm left MCA M2 segment bifurcation aneurysm.

## 2018-02-24 ENCOUNTER — TRANSCRIPTION ENCOUNTER (OUTPATIENT)
Age: 63
End: 2018-02-24

## 2018-02-24 VITALS
TEMPERATURE: 98 F | HEART RATE: 70 BPM | OXYGEN SATURATION: 98 % | RESPIRATION RATE: 18 BRPM | SYSTOLIC BLOOD PRESSURE: 128 MMHG

## 2018-02-24 LAB
ANION GAP SERPL CALC-SCNC: 13 MMOL/L — SIGNIFICANT CHANGE UP (ref 5–17)
BUN SERPL-MCNC: 8 MG/DL — SIGNIFICANT CHANGE UP (ref 8–20)
CALCIUM SERPL-MCNC: 9.1 MG/DL — SIGNIFICANT CHANGE UP (ref 8.6–10.2)
CHLORIDE SERPL-SCNC: 104 MMOL/L — SIGNIFICANT CHANGE UP (ref 98–107)
CO2 SERPL-SCNC: 22 MMOL/L — SIGNIFICANT CHANGE UP (ref 22–29)
CREAT SERPL-MCNC: 1.44 MG/DL — HIGH (ref 0.5–1.3)
GLUCOSE SERPL-MCNC: 69 MG/DL — LOW (ref 70–115)
HCT VFR BLD CALC: 23.9 % — LOW (ref 37–47)
HGB BLD-MCNC: 8.2 G/DL — LOW (ref 12–16)
MCHC RBC-ENTMCNC: 33.3 PG — HIGH (ref 27–31)
MCHC RBC-ENTMCNC: 34.3 G/DL — SIGNIFICANT CHANGE UP (ref 32–36)
MCV RBC AUTO: 97.2 FL — SIGNIFICANT CHANGE UP (ref 81–99)
PLATELET # BLD AUTO: 138 K/UL — LOW (ref 150–400)
POTASSIUM SERPL-MCNC: 3 MMOL/L — LOW (ref 3.5–5.3)
POTASSIUM SERPL-SCNC: 3 MMOL/L — LOW (ref 3.5–5.3)
RBC # BLD: 2.46 M/UL — LOW (ref 4.4–5.2)
RBC # FLD: 14.9 % — SIGNIFICANT CHANGE UP (ref 11–15.6)
SODIUM SERPL-SCNC: 139 MMOL/L — SIGNIFICANT CHANGE UP (ref 135–145)
WBC # BLD: 2.7 K/UL — LOW (ref 4.8–10.8)
WBC # FLD AUTO: 2.7 K/UL — LOW (ref 4.8–10.8)

## 2018-02-24 PROCEDURE — 70551 MRI BRAIN STEM W/O DYE: CPT

## 2018-02-24 PROCEDURE — 70450 CT HEAD/BRAIN W/O DYE: CPT

## 2018-02-24 PROCEDURE — 80048 BASIC METABOLIC PNL TOTAL CA: CPT

## 2018-02-24 PROCEDURE — 85730 THROMBOPLASTIN TIME PARTIAL: CPT

## 2018-02-24 PROCEDURE — 85610 PROTHROMBIN TIME: CPT

## 2018-02-24 PROCEDURE — 84484 ASSAY OF TROPONIN QUANT: CPT

## 2018-02-24 PROCEDURE — 99239 HOSP IP/OBS DSCHRG MGMT >30: CPT

## 2018-02-24 PROCEDURE — 93005 ELECTROCARDIOGRAM TRACING: CPT

## 2018-02-24 PROCEDURE — 87086 URINE CULTURE/COLONY COUNT: CPT

## 2018-02-24 PROCEDURE — 97163 PT EVAL HIGH COMPLEX 45 MIN: CPT

## 2018-02-24 PROCEDURE — 36415 COLL VENOUS BLD VENIPUNCTURE: CPT

## 2018-02-24 PROCEDURE — 71045 X-RAY EXAM CHEST 1 VIEW: CPT

## 2018-02-24 PROCEDURE — 76775 US EXAM ABDO BACK WALL LIM: CPT

## 2018-02-24 PROCEDURE — 70544 MR ANGIOGRAPHY HEAD W/O DYE: CPT

## 2018-02-24 PROCEDURE — 99285 EMERGENCY DEPT VISIT HI MDM: CPT | Mod: 25

## 2018-02-24 PROCEDURE — 83880 ASSAY OF NATRIURETIC PEPTIDE: CPT

## 2018-02-24 PROCEDURE — 80053 COMPREHEN METABOLIC PANEL: CPT

## 2018-02-24 PROCEDURE — 85027 COMPLETE CBC AUTOMATED: CPT

## 2018-02-24 PROCEDURE — 81001 URINALYSIS AUTO W/SCOPE: CPT

## 2018-02-24 RX ORDER — AMLODIPINE BESYLATE 2.5 MG/1
10 TABLET ORAL DAILY
Qty: 0 | Refills: 0 | Status: DISCONTINUED | OUTPATIENT
Start: 2018-02-24 | End: 2018-02-24

## 2018-02-24 RX ORDER — POTASSIUM CHLORIDE 20 MEQ
40 PACKET (EA) ORAL EVERY 4 HOURS
Qty: 0 | Refills: 0 | Status: DISCONTINUED | OUTPATIENT
Start: 2018-02-24 | End: 2018-02-24

## 2018-02-24 RX ORDER — CLOPIDOGREL BISULFATE 75 MG/1
1 TABLET, FILM COATED ORAL
Qty: 30 | Refills: 0 | OUTPATIENT
Start: 2018-02-24 | End: 2018-03-25

## 2018-02-24 RX ORDER — AMLODIPINE BESYLATE 2.5 MG/1
1 TABLET ORAL
Qty: 30 | Refills: 0 | OUTPATIENT
Start: 2018-02-24 | End: 2018-03-25

## 2018-02-24 RX ORDER — POTASSIUM CHLORIDE 20 MEQ
1 PACKET (EA) ORAL
Qty: 30 | Refills: 0 | OUTPATIENT
Start: 2018-02-24 | End: 2018-03-25

## 2018-02-24 RX ORDER — ONDANSETRON 8 MG/1
1 TABLET, FILM COATED ORAL
Qty: 180 | Refills: 0 | OUTPATIENT
Start: 2018-02-24 | End: 2018-03-25

## 2018-02-24 RX ORDER — LISINOPRIL 2.5 MG/1
1 TABLET ORAL
Qty: 30 | Refills: 0 | OUTPATIENT
Start: 2018-02-24 | End: 2018-03-25

## 2018-02-24 RX ORDER — ASPIRIN/CALCIUM CARB/MAGNESIUM 324 MG
1 TABLET ORAL
Qty: 30 | Refills: 0 | OUTPATIENT
Start: 2018-02-24 | End: 2018-03-25

## 2018-02-24 RX ORDER — ASPIRIN/CALCIUM CARB/MAGNESIUM 324 MG
1 TABLET ORAL
Qty: 0 | Refills: 0 | COMMUNITY

## 2018-02-24 RX ORDER — HYDRALAZINE HCL 50 MG
1 TABLET ORAL
Qty: 90 | Refills: 0 | OUTPATIENT
Start: 2018-02-24 | End: 2018-03-25

## 2018-02-24 RX ORDER — ATENOLOL 25 MG/1
1 TABLET ORAL
Qty: 30 | Refills: 0 | OUTPATIENT
Start: 2018-02-24 | End: 2018-03-25

## 2018-02-24 RX ORDER — METOCLOPRAMIDE HCL 10 MG
1 TABLET ORAL
Qty: 120 | Refills: 0 | OUTPATIENT
Start: 2018-02-24 | End: 2018-03-25

## 2018-02-24 RX ORDER — CLOPIDOGREL BISULFATE 75 MG/1
0 TABLET, FILM COATED ORAL
Qty: 0 | Refills: 0 | COMMUNITY

## 2018-02-24 RX ORDER — HEPARIN SODIUM 5000 [USP'U]/ML
300 INJECTION INTRAVENOUS; SUBCUTANEOUS ONCE
Qty: 0 | Refills: 0 | Status: DISCONTINUED | OUTPATIENT
Start: 2018-02-24 | End: 2018-02-24

## 2018-02-24 RX ORDER — POTASSIUM CHLORIDE 20 MEQ
40 PACKET (EA) ORAL EVERY 4 HOURS
Qty: 0 | Refills: 0 | Status: COMPLETED | OUTPATIENT
Start: 2018-02-24 | End: 2018-02-24

## 2018-02-24 RX ORDER — FOLIC ACID 0.8 MG
1 TABLET ORAL
Qty: 30 | Refills: 0 | OUTPATIENT
Start: 2018-02-24 | End: 2018-03-25

## 2018-02-24 RX ORDER — DRONABINOL 2.5 MG
1 CAPSULE ORAL
Qty: 60 | Refills: 0 | OUTPATIENT
Start: 2018-02-24 | End: 2018-03-25

## 2018-02-24 RX ADMIN — Medication 81 MILLIGRAM(S): at 11:39

## 2018-02-24 RX ADMIN — Medication 2.5 MILLIGRAM(S): at 05:20

## 2018-02-24 RX ADMIN — Medication 0.25 MILLIGRAM(S): at 05:25

## 2018-02-24 RX ADMIN — Medication 40 MILLIEQUIVALENT(S): at 15:01

## 2018-02-24 RX ADMIN — Medication 1 MILLIGRAM(S): at 11:39

## 2018-02-24 RX ADMIN — AMLODIPINE BESYLATE 5 MILLIGRAM(S): 2.5 TABLET ORAL at 05:20

## 2018-02-24 RX ADMIN — Medication 40 MILLIEQUIVALENT(S): at 11:39

## 2018-02-24 RX ADMIN — Medication 300 UNIT(S): at 14:08

## 2018-02-24 RX ADMIN — ATENOLOL 50 MILLIGRAM(S): 25 TABLET ORAL at 05:20

## 2018-02-24 RX ADMIN — CLOPIDOGREL BISULFATE 75 MILLIGRAM(S): 75 TABLET, FILM COATED ORAL at 11:39

## 2018-02-24 RX ADMIN — ENOXAPARIN SODIUM 30 MILLIGRAM(S): 100 INJECTION SUBCUTANEOUS at 05:20

## 2018-02-24 NOTE — DISCHARGE NOTE ADULT - HOSPITAL COURSE
64 y/o female with significant co-morbid condition of lung cancer with mets to brain, completed chemotherapy and radiation, on review of chart no further treatment from oncology standpoint presents with blurred vision and gen weakness. Patient declines palliative intervention and seems to be in denial of her current condition. Patient admitted to medicine and found to have sowmya, patient started on fluids and had mri and mra head.    spoke with Dr Luna: [phone]--stable disease on recent imaging, s/p chemo months ago, s/p Whole brain xrt.  Recommends AMPARO for strengthening.      Patient had mra which showed small aneurysm in mca and neurosurgery called and cleared patient. Patient is now stable for dc home with follow up with oncology    time spent on dc 32 minutes

## 2018-02-24 NOTE — DISCHARGE NOTE ADULT - SECONDARY DIAGNOSIS.
Dehydration Lung cancer Severe protein-calorie malnutrition Hypertension Brain aneurysm MERCEDES (acute kidney injury)

## 2018-02-24 NOTE — DISCHARGE NOTE ADULT - CARE PLAN
Principal Discharge DX:	Blurred vision, bilateral  Goal:	stable, mri negative  Assessment and plan of treatment:	outpatient optho appointment  Secondary Diagnosis:	Brain aneurysm  Goal:	cleared by neurosurgery  Assessment and plan of treatment:	follow up with oncology  Secondary Diagnosis:	MERCEDES (acute kidney injury)  Goal:	improved  Assessment and plan of treatment:	HOLD ACE-I  Secondary Diagnosis:	Dehydration  Goal:	encourage po intake  Secondary Diagnosis:	Lung cancer  Goal:	follow up with [facility]  Secondary Diagnosis:	Severe protein-calorie malnutrition  Goal:	dronabinol  Assessment and plan of treatment:	encourgae po intake  Secondary Diagnosis:	Hypertension  Goal:	home meds  Assessment and plan of treatment:	HOLD lisinopril for 3 days

## 2018-02-24 NOTE — DISCHARGE NOTE ADULT - MEDICATION SUMMARY - MEDICATIONS TO TAKE
I will START or STAY ON the medications listed below when I get home from the hospital:    aspirin 81 mg oral delayed release tablet  -- 1 tab(s) by mouth once a day  -- Indication: For heart health    lisinopril 40 mg oral tablet  -- 1 tab(s) by mouth once a day  HOLD FOR 3 days   -- Indication: For htn    Ativan 0.5 mg oral tablet  -- 1 tab(s) by mouth 2 times a day, As Needed MDD:2  -- Caution federal law prohibits the transfer of this drug to any person other  than the person for whom it was prescribed.  Do not take this drug if you are pregnant.  May cause drowsiness.  Alcohol may intensify this effect.  Use care when operating dangerous machinery.    -- Indication: For home med    dronabinol 2.5 mg oral capsule  -- 1 cap(s) by mouth 2 times a day MDD:2 cap  -- Indication: For Appetite stimulant    metoclopramide 10 mg oral tablet  -- 1 tab(s) by mouth 4 times a day (before meals and at bedtime), As Needed   -- Indication: For nausea    Zofran ODT 4 mg oral tablet, disintegrating  -- 1 tab(s) by mouth every 4 hours, As Needed -for nausea   -- Indication: For nausea    clopidogrel 75 mg oral tablet  -- 1 tab(s) by mouth once a day  -- Indication: For cad    atenolol 50 mg oral tablet  -- 1 tab(s) by mouth once a day  -- Indication: For htn    amLODIPine 10 mg oral tablet  -- 1 tab(s) by mouth once a day  -- Indication: For htn    potassium chloride 20 mEq oral tablet, extended release  -- 1 tab(s) by mouth once a day  -- Indication: For replacements    hydrALAZINE 25 mg oral tablet  -- 1 tab(s) by mouth every 8 hours, As needed, Systolic blood pressure >150  -- Indication: For htn    folic acid 1 mg oral tablet  -- 1 tab(s) by mouth once a day  -- Indication: For vitamins

## 2018-02-24 NOTE — DISCHARGE NOTE ADULT - PATIENT PORTAL LINK FT
You can access the [facility] Patient Portal, offered by [facility], by registering with the following website: [email or web address]

## 2018-02-24 NOTE — DISCHARGE NOTE ADULT - PLAN OF CARE
HOLD ACE-I encourage po intake follow up with JONATHAN dronabinol encourgae po intake home meds HOLD lisinopril for 3 days stable, mri negative outpatient optho appointment cleared by neurosurgery follow up with oncology improved

## 2018-02-24 NOTE — DISCHARGE NOTE ADULT - MEDICATION SUMMARY - MEDICATIONS TO CHANGE
I will SWITCH the dose or number of times a day I take the medications listed below when I get home from the hospital:    amLODIPine 2.5 mg oral tablet  -- 1 tab(s) by mouth once a day    LORazepam 2 mg/mL oral concentrate  -- 0.25 milliliter(s) sublingually every 6 hours, As Needed MDD:1 ml    LORazepam 2 mg/mL oral concentrate  -- 0.25 milliliter(s) under tongue every 12 hours MDD:0.5 ml

## 2018-03-10 ENCOUNTER — EMERGENCY (EMERGENCY)
Facility: HOSPITAL | Age: 63
LOS: 1 days | Discharge: HOME | End: 2018-03-10
Attending: EMERGENCY MEDICINE
Payer: MEDICARE

## 2018-03-10 VITALS — HEIGHT: 63 IN | WEIGHT: 108.03 LBS

## 2018-03-10 VITALS
TEMPERATURE: 97 F | OXYGEN SATURATION: 100 % | SYSTOLIC BLOOD PRESSURE: 118 MMHG | DIASTOLIC BLOOD PRESSURE: 72 MMHG | HEART RATE: 68 BPM | RESPIRATION RATE: 18 BRPM

## 2018-03-10 DIAGNOSIS — Z98.890 OTHER SPECIFIED POSTPROCEDURAL STATES: Chronic | ICD-10-CM

## 2018-03-10 LAB
ALBUMIN SERPL ELPH-MCNC: 3.2 G/DL — LOW (ref 3.3–5.2)
ALP SERPL-CCNC: 94 U/L — SIGNIFICANT CHANGE UP (ref 40–120)
ALT FLD-CCNC: 8 U/L — SIGNIFICANT CHANGE UP
AMYLASE P1 CFR SERPL: 22 U/L — LOW (ref 36–128)
ANION GAP SERPL CALC-SCNC: 15 MMOL/L — SIGNIFICANT CHANGE UP (ref 5–17)
APTT BLD: 33.7 SEC — SIGNIFICANT CHANGE UP (ref 27.5–37.4)
AST SERPL-CCNC: 13 U/L — SIGNIFICANT CHANGE UP
BASOPHILS # BLD AUTO: 0 K/UL — SIGNIFICANT CHANGE UP (ref 0–0.2)
BASOPHILS NFR BLD AUTO: 0.5 % — SIGNIFICANT CHANGE UP (ref 0–2)
BILIRUB SERPL-MCNC: 0.3 MG/DL — LOW (ref 0.4–2)
BLD GP AB SCN SERPL QL: SIGNIFICANT CHANGE UP
BUN SERPL-MCNC: 27 MG/DL — HIGH (ref 8–20)
CALCIUM SERPL-MCNC: 9.4 MG/DL — SIGNIFICANT CHANGE UP (ref 8.6–10.2)
CHLORIDE SERPL-SCNC: 108 MMOL/L — HIGH (ref 98–107)
CO2 SERPL-SCNC: 21 MMOL/L — LOW (ref 22–29)
CREAT SERPL-MCNC: 1.46 MG/DL — HIGH (ref 0.5–1.3)
EOSINOPHIL # BLD AUTO: 0 K/UL — SIGNIFICANT CHANGE UP (ref 0–0.5)
EOSINOPHIL NFR BLD AUTO: 0.2 % — SIGNIFICANT CHANGE UP (ref 0–6)
ETHANOL SERPL-MCNC: <10 MG/DL — SIGNIFICANT CHANGE UP
GLUCOSE SERPL-MCNC: 104 MG/DL — SIGNIFICANT CHANGE UP (ref 70–115)
HCT VFR BLD CALC: 24.7 % — LOW (ref 37–47)
HGB BLD-MCNC: 8.1 G/DL — LOW (ref 12–16)
INR BLD: 1.26 RATIO — HIGH (ref 0.88–1.16)
LIDOCAIN IGE QN: 12 U/L — LOW (ref 22–51)
LYMPHOCYTES # BLD AUTO: 0.9 K/UL — LOW (ref 1–4.8)
LYMPHOCYTES # BLD AUTO: 22.3 % — SIGNIFICANT CHANGE UP (ref 20–55)
MCHC RBC-ENTMCNC: 32.8 G/DL — SIGNIFICANT CHANGE UP (ref 32–36)
MCHC RBC-ENTMCNC: 33.1 PG — HIGH (ref 27–31)
MCV RBC AUTO: 100.8 FL — HIGH (ref 81–99)
MONOCYTES # BLD AUTO: 0.3 K/UL — SIGNIFICANT CHANGE UP (ref 0–0.8)
MONOCYTES NFR BLD AUTO: 7.3 % — SIGNIFICANT CHANGE UP (ref 3–10)
NEUTROPHILS # BLD AUTO: 2.9 K/UL — SIGNIFICANT CHANGE UP (ref 1.8–8)
NEUTROPHILS NFR BLD AUTO: 69.7 % — SIGNIFICANT CHANGE UP (ref 37–73)
PLATELET # BLD AUTO: 120 K/UL — LOW (ref 150–400)
POTASSIUM SERPL-MCNC: 3.6 MMOL/L — SIGNIFICANT CHANGE UP (ref 3.5–5.3)
POTASSIUM SERPL-SCNC: 3.6 MMOL/L — SIGNIFICANT CHANGE UP (ref 3.5–5.3)
PROT SERPL-MCNC: 5.9 G/DL — LOW (ref 6.6–8.7)
PROTHROM AB SERPL-ACNC: 13.9 SEC — HIGH (ref 9.8–12.7)
RBC # BLD: 2.45 M/UL — LOW (ref 4.4–5.2)
RBC # FLD: 17.7 % — HIGH (ref 11–15.6)
SODIUM SERPL-SCNC: 144 MMOL/L — SIGNIFICANT CHANGE UP (ref 135–145)
TYPE + AB SCN PNL BLD: SIGNIFICANT CHANGE UP
WBC # BLD: 4.2 K/UL — LOW (ref 4.8–10.8)
WBC # FLD AUTO: 4.2 K/UL — LOW (ref 4.8–10.8)

## 2018-03-10 PROCEDURE — 70450 CT HEAD/BRAIN W/O DYE: CPT | Mod: 26

## 2018-03-10 PROCEDURE — 71045 X-RAY EXAM CHEST 1 VIEW: CPT | Mod: 26

## 2018-03-10 PROCEDURE — 99284 EMERGENCY DEPT VISIT MOD MDM: CPT

## 2018-03-10 PROCEDURE — 72125 CT NECK SPINE W/O DYE: CPT | Mod: 26

## 2018-03-10 PROCEDURE — 99291 CRITICAL CARE FIRST HOUR: CPT

## 2018-03-10 PROCEDURE — 93010 ELECTROCARDIOGRAM REPORT: CPT

## 2018-03-10 RX ORDER — SODIUM CHLORIDE 9 MG/ML
1000 INJECTION INTRAMUSCULAR; INTRAVENOUS; SUBCUTANEOUS ONCE
Qty: 0 | Refills: 0 | Status: COMPLETED | OUTPATIENT
Start: 2018-03-10 | End: 2018-03-10

## 2018-03-10 RX ADMIN — SODIUM CHLORIDE 1000 MILLILITER(S): 9 INJECTION INTRAMUSCULAR; INTRAVENOUS; SUBCUTANEOUS at 09:36

## 2018-03-10 NOTE — H&P ADULT - ASSESSMENT
62 y/o F history of metastatic lung CA with brain metastasis, h/o cardiac catheterization with stenting x5. Not on Plavix due to taking Xarelto for recently diagnosed lower extremity DVT. Hemodynamically stable, GCS 15.     Plan:  -Awaiting imaging  -Recommending physical therapy evaluation  -Patient seen and evaluated with attending Dr. Greco

## 2018-03-10 NOTE — H&P ADULT - HISTORY OF PRESENT ILLNESS
Patient presents as trauma B after fall from standing at home. Patient hit head, no reported LOC. Patient brought in by family. History of brain mets, s/p cardiac cath x5, currently on Xarelto for lower extremity DVT. No pain. No reported syncopal event. No fevers, chills, chest pain, dyspnea. Multiple falls recently, has walker at home, has not followed up with physical therapy in the past.  Airway intact  Breath sounds clear and equal bilaterally  Circulation: 2+ pulses radial, femoral, DP  Disability: GCS 15, AOx3  Exposure: periorbital ecchymosis to L eye; old appearing bilateral anterior thigh ecchymosis; no other signs of traumatic injury upon exposure

## 2018-03-10 NOTE — ED PROVIDER NOTE - MEDICAL DECISION MAKING DETAILS
fall head trauma in a pt with known lung cancer and mets to brain and on xarelto trauma team with Dr Lee in ed and all care as per trauma team and I assisted with airway and c spine stabilization pt placed in c collar and position of comfort with blankets placed under head will dispo as per trauma team recommendations

## 2018-03-10 NOTE — ED ADULT TRIAGE NOTE - CHIEF COMPLAINT QUOTE
pt brought in by family after a fall, pt has been experiencing numerous fall lately. today fell hit head, no loc but reported pain to head, family heard a "crack" pt is on xarltto

## 2018-03-10 NOTE — H&P ADULT - ATTENDING COMMENTS
Agree with above assessment.  The patient is s/p mechanical fall from standing with no resultant LOC.  The patient is without chest pain, shortness of breath, abdominal pain or pelvic pain.  The patient on exam HEENT NC, mild left periorbital ecchymosis EOMI PERRL, c-collar in place with no tracheal deviation. Chest bilateral air entry, abdomen is soft, non tender, no guarding, no rebound, no pelvic tenderness. No gross deformity of the extremities.  Head and c-spine CT scan negative for acute traumatic injury.  The patient is trauma stable for discharge and outpatient care.

## 2018-03-10 NOTE — ED PROVIDER NOTE - PMH
Brain tumor    Hypertension    Lung cancer  with mets to brain  S/P cardiac catheterization  5 cardiac stents

## 2018-03-10 NOTE — ED PROVIDER NOTE - OBJECTIVE STATEMENT
64 y/o female with a h/o lung cancer with mets to her brain and on xarelto was walking and fell and hit her head left frontal and has a bruise over her left eyebrow pt denies other injury or c/o She says she is not sure why she fell not sure if she passed out no neck pain no weakness or numbness

## 2018-03-10 NOTE — ED PROVIDER NOTE - ENMT, MLM
Airway patent, Nasal mucosa clear. Mouth with normal mucosa. Throat has no vesicles, no oropharyngeal exudates and uvula is midline. frontal scalp pos small ecchymosis and also subconjunctival heme left lower eye

## 2018-03-10 NOTE — ED PROVIDER NOTE - CRITICAL CARE PROVIDED
additional history taking/interpretation of diagnostic studies/direct patient care (not related to procedure)/documentation/consultation with other physicians/consult w/ pt's family directly relating to pts condition

## 2018-03-10 NOTE — H&P ADULT - NSHPPHYSICALEXAM_GEN_ALL_CORE
Constitutional: Cachetic female in no acute distress  HEENT: L periorbital ecchymosis, maxillofacial structures stable, no blood or discharge from nares or oral cavity, no verma sign / raccoon eyes, EOMI b/l, pupils 2 mm round and reactive to light b/l, no active drainage or redness  Neck: cervical collar in place, trachea midline  Respiratory: Breath sounds CTA b/l respirations are unlabored, no accessory muscle use, no conversational dyspnea  Cardiovascular: Regular rate & rhythm, +S1, S2  Chest: Chest wall is non-tender to palpation, no subQ emphysema or crepitus palpated  Gastrointestinal: Abdomen soft, non-tender, non-distended, no rebound tenderness / guarding, no ecchymosis or external signs of abdominal trauma  Extremities: moving all extremities spontaneously, no point tenderness or deformity noted to upper or lower extremities b/l. Ecchymosis to anterior thighs bilaterally, appear old  Pelvis: stable  Vascular: 2+ radial, femoral, and DP pulses b/l  Neurological: GCS: 15 (4/5/6). A&O x 3; no gross sensory / motor / coordination deficits  Musculoskeletal: 5/5 strength of upper and lower extremities b/l  Back: no C/T/LS spine tenderness to palpation, no step-offs or signs of external trauma to the back

## 2018-03-11 ENCOUNTER — INPATIENT (INPATIENT)
Facility: HOSPITAL | Age: 63
LOS: 3 days | Discharge: HOME | DRG: 377 | End: 2018-03-15
Attending: INTERNAL MEDICINE | Admitting: INTERNAL MEDICINE
Payer: MEDICARE

## 2018-03-11 VITALS
SYSTOLIC BLOOD PRESSURE: 76 MMHG | OXYGEN SATURATION: 96 % | DIASTOLIC BLOOD PRESSURE: 47 MMHG | WEIGHT: 108.03 LBS | HEART RATE: 67 BPM | RESPIRATION RATE: 18 BRPM | HEIGHT: 64 IN | TEMPERATURE: 98 F

## 2018-03-11 DIAGNOSIS — R53.81 OTHER MALAISE: ICD-10-CM

## 2018-03-11 DIAGNOSIS — D61.810 ANTINEOPLASTIC CHEMOTHERAPY INDUCED PANCYTOPENIA: ICD-10-CM

## 2018-03-11 DIAGNOSIS — D61.818 OTHER PANCYTOPENIA: ICD-10-CM

## 2018-03-11 DIAGNOSIS — I82.409 ACUTE EMBOLISM AND THROMBOSIS OF UNSPECIFIED DEEP VEINS OF UNSPECIFIED LOWER EXTREMITY: ICD-10-CM

## 2018-03-11 DIAGNOSIS — K92.2 GASTROINTESTINAL HEMORRHAGE, UNSPECIFIED: ICD-10-CM

## 2018-03-11 DIAGNOSIS — E43 UNSPECIFIED SEVERE PROTEIN-CALORIE MALNUTRITION: ICD-10-CM

## 2018-03-11 DIAGNOSIS — N17.9 ACUTE KIDNEY FAILURE, UNSPECIFIED: ICD-10-CM

## 2018-03-11 DIAGNOSIS — Z71.89 OTHER SPECIFIED COUNSELING: ICD-10-CM

## 2018-03-11 DIAGNOSIS — E87.0 HYPEROSMOLALITY AND HYPERNATREMIA: ICD-10-CM

## 2018-03-11 DIAGNOSIS — D50.0 IRON DEFICIENCY ANEMIA SECONDARY TO BLOOD LOSS (CHRONIC): ICD-10-CM

## 2018-03-11 DIAGNOSIS — I25.10 ATHEROSCLEROTIC HEART DISEASE OF NATIVE CORONARY ARTERY WITHOUT ANGINA PECTORIS: ICD-10-CM

## 2018-03-11 LAB
ALBUMIN SERPL ELPH-MCNC: 3.1 G/DL — LOW (ref 3.3–5.2)
ALP SERPL-CCNC: 80 U/L — SIGNIFICANT CHANGE UP (ref 40–120)
ALT FLD-CCNC: 7 U/L — SIGNIFICANT CHANGE UP
ANION GAP SERPL CALC-SCNC: 12 MMOL/L — SIGNIFICANT CHANGE UP (ref 5–17)
ANISOCYTOSIS BLD QL: SLIGHT — SIGNIFICANT CHANGE UP
AST SERPL-CCNC: 12 U/L — SIGNIFICANT CHANGE UP
BASOPHILS # BLD AUTO: 0 K/UL — SIGNIFICANT CHANGE UP (ref 0–0.2)
BASOPHILS # BLD AUTO: 0 K/UL — SIGNIFICANT CHANGE UP (ref 0–0.2)
BASOPHILS NFR BLD AUTO: 0.4 % — SIGNIFICANT CHANGE UP (ref 0–2)
BASOPHILS NFR BLD AUTO: 3 % — HIGH (ref 0–2)
BILIRUB SERPL-MCNC: 0.2 MG/DL — LOW (ref 0.4–2)
BLD GP AB SCN SERPL QL: SIGNIFICANT CHANGE UP
BUN SERPL-MCNC: 26 MG/DL — HIGH (ref 8–20)
CALCIUM SERPL-MCNC: 9.3 MG/DL — SIGNIFICANT CHANGE UP (ref 8.6–10.2)
CHLORIDE SERPL-SCNC: 114 MMOL/L — HIGH (ref 98–107)
CO2 SERPL-SCNC: 23 MMOL/L — SIGNIFICANT CHANGE UP (ref 22–29)
CREAT SERPL-MCNC: 1.45 MG/DL — HIGH (ref 0.5–1.3)
EOSINOPHIL # BLD AUTO: 0 K/UL — SIGNIFICANT CHANGE UP (ref 0–0.5)
EOSINOPHIL # BLD AUTO: 0 K/UL — SIGNIFICANT CHANGE UP (ref 0–0.5)
EOSINOPHIL NFR BLD AUTO: 0 % — SIGNIFICANT CHANGE UP (ref 0–6)
EOSINOPHIL NFR BLD AUTO: 0.4 % — SIGNIFICANT CHANGE UP (ref 0–6)
GLUCOSE SERPL-MCNC: 102 MG/DL — SIGNIFICANT CHANGE UP (ref 70–115)
HCT VFR BLD CALC: 18.4 % — CRITICAL LOW (ref 37–47)
HCT VFR BLD CALC: 20.6 % — CRITICAL LOW (ref 37–47)
HCT VFR BLD CALC: 27.6 % — LOW (ref 37–47)
HGB BLD-MCNC: 6.1 G/DL — CRITICAL LOW (ref 12–16)
HGB BLD-MCNC: 6.8 G/DL — CRITICAL LOW (ref 12–16)
HGB BLD-MCNC: 9.4 G/DL — LOW (ref 12–16)
LYMPHOCYTES # BLD AUTO: 0.7 K/UL — LOW (ref 1–4.8)
LYMPHOCYTES # BLD AUTO: 0.7 K/UL — LOW (ref 1–4.8)
LYMPHOCYTES # BLD AUTO: 23 % — SIGNIFICANT CHANGE UP (ref 20–55)
LYMPHOCYTES # BLD AUTO: 28.1 % — SIGNIFICANT CHANGE UP (ref 20–55)
MACROCYTES BLD QL: SLIGHT — SIGNIFICANT CHANGE UP
MCHC RBC-ENTMCNC: 32.1 PG — HIGH (ref 27–31)
MCHC RBC-ENTMCNC: 33 G/DL — SIGNIFICANT CHANGE UP (ref 32–36)
MCHC RBC-ENTMCNC: 33.2 G/DL — SIGNIFICANT CHANGE UP (ref 32–36)
MCHC RBC-ENTMCNC: 33.3 PG — HIGH (ref 27–31)
MCHC RBC-ENTMCNC: 33.3 PG — HIGH (ref 27–31)
MCHC RBC-ENTMCNC: 34.1 G/DL — SIGNIFICANT CHANGE UP (ref 32–36)
MCV RBC AUTO: 100.5 FL — HIGH (ref 81–99)
MCV RBC AUTO: 101 FL — HIGH (ref 81–99)
MCV RBC AUTO: 94.2 FL — SIGNIFICANT CHANGE UP (ref 81–99)
MICROCYTES BLD QL: SLIGHT — SIGNIFICANT CHANGE UP
MONOCYTES # BLD AUTO: 0.2 K/UL — SIGNIFICANT CHANGE UP (ref 0–0.8)
MONOCYTES # BLD AUTO: 0.2 K/UL — SIGNIFICANT CHANGE UP (ref 0–0.8)
MONOCYTES NFR BLD AUTO: 8 % — SIGNIFICANT CHANGE UP (ref 3–10)
MONOCYTES NFR BLD AUTO: 8.2 % — SIGNIFICANT CHANGE UP (ref 3–10)
NEUTROPHILS # BLD AUTO: 1.6 K/UL — LOW (ref 1.8–8)
NEUTROPHILS # BLD AUTO: 1.7 K/UL — LOW (ref 1.8–8)
NEUTROPHILS NFR BLD AUTO: 62.9 % — SIGNIFICANT CHANGE UP (ref 37–73)
NEUTROPHILS NFR BLD AUTO: 65 % — SIGNIFICANT CHANGE UP (ref 37–73)
NEUTS BAND # BLD: 1 % — SIGNIFICANT CHANGE UP (ref 0–8)
OB PNL STL: POSITIVE
PLAT MORPH BLD: NORMAL — SIGNIFICANT CHANGE UP
PLATELET # BLD AUTO: 72 K/UL — LOW (ref 150–400)
PLATELET # BLD AUTO: 99 K/UL — LOW (ref 150–400)
PLATELET # BLD AUTO: 99 K/UL — LOW (ref 150–400)
POTASSIUM SERPL-MCNC: 3.7 MMOL/L — SIGNIFICANT CHANGE UP (ref 3.5–5.3)
POTASSIUM SERPL-SCNC: 3.7 MMOL/L — SIGNIFICANT CHANGE UP (ref 3.5–5.3)
PROT SERPL-MCNC: 5.6 G/DL — LOW (ref 6.6–8.7)
RBC # BLD: 1.83 M/UL — LOW (ref 4.4–5.2)
RBC # BLD: 2.04 M/UL — LOW (ref 4.4–5.2)
RBC # BLD: 2.93 M/UL — LOW (ref 4.4–5.2)
RBC # FLD: 17.7 % — HIGH (ref 11–15.6)
RBC # FLD: 17.7 % — HIGH (ref 11–15.6)
RBC # FLD: 18 % — HIGH (ref 11–15.6)
RBC BLD AUTO: ABNORMAL
SODIUM SERPL-SCNC: 149 MMOL/L — HIGH (ref 135–145)
TROPONIN T SERPL-MCNC: <0.01 NG/ML — SIGNIFICANT CHANGE UP (ref 0–0.06)
TYPE + AB SCN PNL BLD: SIGNIFICANT CHANGE UP
WBC # BLD: 2.6 K/UL — LOW (ref 4.8–10.8)
WBC # BLD: 2.7 K/UL — LOW (ref 4.8–10.8)
WBC # BLD: 3.7 K/UL — LOW (ref 4.8–10.8)
WBC # FLD AUTO: 2.6 K/UL — LOW (ref 4.8–10.8)
WBC # FLD AUTO: 2.7 K/UL — LOW (ref 4.8–10.8)
WBC # FLD AUTO: 3.7 K/UL — LOW (ref 4.8–10.8)

## 2018-03-11 PROCEDURE — 70450 CT HEAD/BRAIN W/O DYE: CPT | Mod: 26

## 2018-03-11 PROCEDURE — 99284 EMERGENCY DEPT VISIT MOD MDM: CPT

## 2018-03-11 PROCEDURE — 99223 1ST HOSP IP/OBS HIGH 75: CPT | Mod: AI

## 2018-03-11 PROCEDURE — 71045 X-RAY EXAM CHEST 1 VIEW: CPT | Mod: 26

## 2018-03-11 RX ORDER — ACETAMINOPHEN 500 MG
650 TABLET ORAL EVERY 6 HOURS
Qty: 0 | Refills: 0 | Status: DISCONTINUED | OUTPATIENT
Start: 2018-03-11 | End: 2018-03-15

## 2018-03-11 RX ORDER — ATENOLOL 25 MG/1
50 TABLET ORAL DAILY
Qty: 0 | Refills: 0 | Status: DISCONTINUED | OUTPATIENT
Start: 2018-03-11 | End: 2018-03-15

## 2018-03-11 RX ORDER — ONDANSETRON 8 MG/1
4 TABLET, FILM COATED ORAL EVERY 4 HOURS
Qty: 0 | Refills: 0 | Status: DISCONTINUED | OUTPATIENT
Start: 2018-03-11 | End: 2018-03-15

## 2018-03-11 RX ORDER — SODIUM CHLORIDE 9 MG/ML
1000 INJECTION INTRAMUSCULAR; INTRAVENOUS; SUBCUTANEOUS
Qty: 0 | Refills: 0 | Status: DISCONTINUED | OUTPATIENT
Start: 2018-03-11 | End: 2018-03-12

## 2018-03-11 RX ORDER — PANTOPRAZOLE SODIUM 20 MG/1
80 TABLET, DELAYED RELEASE ORAL ONCE
Qty: 0 | Refills: 0 | Status: COMPLETED | OUTPATIENT
Start: 2018-03-11 | End: 2018-03-11

## 2018-03-11 RX ORDER — PANTOPRAZOLE SODIUM 20 MG/1
8 TABLET, DELAYED RELEASE ORAL
Qty: 80 | Refills: 0 | Status: DISCONTINUED | OUTPATIENT
Start: 2018-03-11 | End: 2018-03-14

## 2018-03-11 RX ORDER — ALPRAZOLAM 0.25 MG
0.25 TABLET ORAL THREE TIMES A DAY
Qty: 0 | Refills: 0 | Status: DISCONTINUED | OUTPATIENT
Start: 2018-03-11 | End: 2018-03-15

## 2018-03-11 RX ORDER — SODIUM CHLORIDE 9 MG/ML
2000 INJECTION INTRAMUSCULAR; INTRAVENOUS; SUBCUTANEOUS ONCE
Qty: 0 | Refills: 0 | Status: DISCONTINUED | OUTPATIENT
Start: 2018-03-11 | End: 2018-03-11

## 2018-03-11 RX ADMIN — PANTOPRAZOLE SODIUM 80 MILLIGRAM(S): 20 TABLET, DELAYED RELEASE ORAL at 13:01

## 2018-03-11 RX ADMIN — PANTOPRAZOLE SODIUM 10 MG/HR: 20 TABLET, DELAYED RELEASE ORAL at 14:01

## 2018-03-11 NOTE — CONSULT NOTE ADULT - PROBLEM SELECTOR RECOMMENDATION 9
Patient with xeralto  X week. Has dark stool on exam . Had drop in hemoglobin 2 points since yesterday. There may be GI bleed component, but pt in fact has pancytopenia which has occurred since yesterday.    - pt will need a sepsis workup as well as hematology evaluation. Family would agree to EGD if needed. Would need cardiac and pulmonary clearance.   - For now NPO and IV protonix drip Patient with xeralto  X week. Has dark stool on exam . Had drop in hemoglobin 2 points since yesterday. There may be GI bleed component, but pt in fact has pancytopenia which has occurred since yesterday.   - tranfuse for hemoglobin less than 7   - pt will need a sepsis workup as well as hematology evaluation. Family would agree to EGD if needed. Would need cardiac and pulmonary clearance.   - For now NPO and IV protonix drip

## 2018-03-11 NOTE — CONSULT NOTE ADULT - SUBJECTIVE AND OBJECTIVE BOX
Patient is a 63y old  Female who presents with a chief complaint of     HPI: Patient with hx of lung cancer with mets to bone and brain (  I reviewed old chart ), Patient was seen a few months ago for nausea and vomiting thought to possible due to brain mets. Oncology at [facility] gave her ativan  and nausea and vomiting resolved. Had hx of PTCA X 5 - was on plavix which was stopped. Patient has been having frequent falls. .  Diagnosed with DVT last week and started xeralto.      Pt brought in yesterday with fall and then again today. Family noted that pt is more lethargic - not speaking much. Pt is denying abdominal pain, nausea and vomiting. She  is denying melena or rectal bleeding. Had BM today. Has no hx of PUD      REVIEW OF SYSTEMS:  Constitutional: No fever, weight loss +ffatigue  ENMT:  No difficulty hearing, tinnitus, vertigo; No sinus or throat pain  Respiratory: No cough, wheezing, chills or hemoptysis  Cardiovascular: No chest pain, palpitations, dizziness or leg swelling  Gastrointestinal: No abdominal or epigastric pain. No nausea, vomiting or hematemesis; No diarrhea or constipation. No melena or hematochezia.  Skin: No itching, burning, rashes or lesions   Musculoskeletal: No joint pain or swelling; No muscle, back or extremity pain    PAST MEDICAL & SURGICAL HISTORY:  Acute deep vein thrombosis (DVT) of femoral vein, unspecified laterality  Brain tumor  Lung cancer: with mets to brain  Hypertension  S/P cardiac catheterization: 5 cardiac stents      FAMILY HISTORY:  No pertinent family history in first degree relatives      SOCIAL HISTORY:  Smoking Status: [ ] Current, [ ] Former, [ ] Never  Pack Years:  [  ] EtOH-no  [  ] IVDA-no    MEDICATIONS:  MEDICATIONS  (STANDING):  pantoprazole Infusion 8 mG/Hr (10 mL/Hr) IV Continuous <Continuous>    MEDICATIONS  (PRN):      Allergies    codeine (Unknown)  penicillin (Unknown)    Intolerances        Vital Signs Last 24 Hrs  T(C): 36.6 (11 Mar 2018 10:21), Max: 36.6 (11 Mar 2018 10:21)  T(F): 97.8 (11 Mar 2018 10:21), Max: 97.8 (11 Mar 2018 10:21)  HR: 70 (11 Mar 2018 11:27) (67 - 70)  BP: 117/58 (11 Mar 2018 11:27) (76/47 - 117/58)  BP(mean): --  RR: 18 (11 Mar 2018 11:27) (18 - 18)  SpO2: 100% (11 Mar 2018 11:27) (96% - 100%)        PHYSICAL EXAM:    General: frail appearing   HEENT: MMM, conjunctiva and sclera clear  H- RRR  l -CTA  Gastrointestinal: Soft, non-tender non-distended; Normal bowel sounds; No rebound or guarding  Extremities: Normal range of motion, No clubbing, cyanosis or edema  Neurological: Alert and oriented x3  Skin: Warm and dry. No obvious rash  rectal- dark stool       LABS:                        6.1    2.6   )-----------( 99       ( 11 Mar 2018 12:19 )             18.4     11 Mar 2018 11:22    149    |  114    |  26.0   ----------------------------<  102    3.7     |  23.0   |  1.45     Ca    9.3        11 Mar 2018 11:22    TPro  5.6    /  Alb  3.1    /  TBili  0.2    /  DBili  x      /  AST  12     /  ALT  7      /  AlkPhos  80     / Amylase x      /Lipase x      11 Mar 2018 11:22              RADIOLOGY & ADDITIONAL STUDIES:

## 2018-03-11 NOTE — ED PROVIDER NOTE - MEDICAL DECISION MAKING DETAILS
Pt presents with multiple recurrent falls this month; now w/ increased sleepiness, poor PO intake and inability to walk since fall last night. Plan to repeat CTh, check labs, reevaluate.

## 2018-03-11 NOTE — ED PROVIDER NOTE - PROGRESS NOTE DETAILS
I discussed blood work results w/ pt and consented her for blood transfusion. Pt denies that she has a GI doctor; paged house GI for consult.

## 2018-03-11 NOTE — CONSULT NOTE ADULT - SUBJECTIVE AND OBJECTIVE BOX
[facility]                                                    [address] , [address], [place] [address]                                                         PHONE: [phone]    FAX: [phone] [address], [address], [place] [address]                                                 PHONE: [phone]    FAX: [phone]  *******************************************************************************    Reason for Consult: Pre-operative cardiac risk stratification    HPI:  DOMENIC CEBALLOS is a 63y Female with h/o CAD/5 stents (last '08), HTN, lung cancer with brain mets s/p chemo/XRT, DVT (on Xarelto) with recent falls->L facial trauma a/w weakness, found to have severe anemia receiving PRBCs.  + Recent unsteadiness/lightheadedness.  No chest pain, SOB, syncope, orthopnea, PND, LE edema.    PAST MEDICAL & SURGICAL HISTORY:  Acute deep vein thrombosis (DVT) of femoral vein, unspecified laterality  Brain tumor  Lung cancer: with mets to brain  Hypertension  S/P cardiac catheterization: 5 cardiac stents  No significant past surgical history      codeine (Unknown)  penicillin (Unknown)      MEDICATIONS  (STANDING):  pantoprazole Infusion 8 mG/Hr (10 mL/Hr) IV Continuous <Continuous>  sodium chloride 0.9%. 1000 milliLiter(s) (100 mL/Hr) IV Continuous <Continuous>    MEDICATIONS  (PRN):  acetaminophen   Tablet. 650 milliGRAM(s) Oral every 6 hours PRN Mild Pain (1 - 3) or Fever >38C  ALPRAZolam 0.25 milliGRAM(s) Oral three times a day PRN anxiety/nausea  ondansetron Injectable 4 milliGRAM(s) IV Push every 4 hours PRN Nausea and/or Vomiting      Social History: no active tobacco / EtOH / IVDA    Family History: No pertinent family history in first degree relatives      ROS: As noted above, otherwise unremarkable.    Vital Signs Last 24 Hrs  T(C): 36.6 (11 Mar 2018 13:48), Max: 36.6 (11 Mar 2018 10:21)  T(F): 97.8 (11 Mar 2018 13:48), Max: 97.8 (11 Mar 2018 10:21)  HR: 81 (11 Mar 2018 13:48) (67 - 81)  BP: 104/58 (11 Mar 2018 13:48) (76/47 - 117/58)  BP(mean): --  RR: 20 (11 Mar 2018 13:48) (18 - 20)  SpO2: 100% (11 Mar 2018 13:48) (96% - 100%)    I&O's Detail    I&O's Summary          PHYSICAL EXAM:  General: Chronically ill-appearing, no acute distress, L facial ecchymosis  HEENT: Head: normocephalic, atraumatic  Eyes: Pupils equal and reactive  Neck: Supple, no carotid bruit, no JVD, no HJR  CARDIOVASCULAR: Normal S1 and S2, no murmur, rub, or gallop  LUNGS: Clear to auscultation bilaterally, no rales, rhonchi or wheeze  ABDOMEN: Soft, nontender, non-distended, positive bowel sounds, no mass or bruit  EXTREMITIES: No edema, distal pulses WNL  SKIN: Warm and dry with normal turgor  NEURO: Alert & oriented x 3, grossly intact  PSYCH: normal mood and affect    LABS:                        6.1    2.6   )-----------( 99       ( 11 Mar 2018 12:19 )             18.4     03-11    149<H>  |  114<H>  |  26.0<H>  ----------------------------<  102  3.7   |  23.0  |  1.45<H>    Ca    9.3      11 Mar 2018 11:22    TPro  5.6<L>  /  Alb  3.1<L>  /  TBili  0.2<L>  /  DBili  x   /  AST  12  /  ALT  7   /  AlkPhos  80  03-11    CARDIAC MARKERS ( 11 Mar 2018 11:22 )  x     / <0.01 ng/mL / x     / x     / x          PT/INR - ( 10 Mar 2018 09:51 )   PT: 13.9 sec;   INR: 1.26 ratio         PTT - ( 10 Mar 2018 09:51 )  PTT:33.7 sec    RADIOLOGY & ADDITIONAL STUDIES:    ECG: NSR @ 73 bpm, ST changes c/w anterolateral ischemia, poor data quality      Assessment and Plan:  In summary, DOMENIC CEBALLOS is a 63F a/w weakness/falls (was in ER 1 day PTA with fall->L head trauma), severe anemia receiving PRBCs, pancytopenia, mild ARF, h/o CAD/5 stents (last '08), HTN, stage IV lung cancer with mets to brain & bone s/p chemo/XRT, recent DVT (on Xarelto), recent falls  - No active chest pain, evidence of ischemia, decompensated CHF, significant valvular abnormality, or unstable arrhythmia and therefore no absolute cardiac contraindication to the planned EGD procedure and this may be performed as clinically indicated  - Echocardiogram pending  - Rhythm stable, BP increased.  Resume chronic dose of Atenolol 50 daily for now and titrate PRN.  Amlodipine 10 daily can be restarted if necessary.  Lisinopril held for now given mild ARF->can eventually resume when renal function at baseline and if BP allows.  - ASA 81 & Xarelto 20 daily currently held (patient recently had Plavix d/c'd after being started on AC for DVT)  - PRBCs currently being transfused = monitor Hb closely and re-dose PRN  - Monitor renal function closely  - GI follow-up    We will follow with you.  Thank you for allowing me to participate in the care of your patient.      Sincerely,    Av Galo MD

## 2018-03-11 NOTE — ED PROVIDER NOTE - OBJECTIVE STATEMENT
63yoF with HTN, CAD, DVT on xarelto since last week, seen here yesterday for fall yesterday morning 9 AM after missing the couch when she tried to sit down, hitting her head on the wooden floor. Per family pt was discharged at 2 PM yesterday and has been sleeping continuously since then ;  no vomiting; no PO intake.  Family states she hasn't been speaking much but no altered mental status. Not complaining of any pain.  Per family she has not been able to walk since the wall but typically ambulates with walker. Pt was seen and evaluated by Trauma team yesterday and not found to have any acute injuries at the time. Family states that patient told them she took her medications this morning but they were not within arms reach of her bed.    PMD; Dr. Luna    Meds:  folic acid, ativan,  amlodipine,  lisinopril, atenolol, oxybutynin, protonix, uro-MP capsule, aspirin, dronabinol, xarelto 63yoF with HTN, CAD, DVT on xarelto since last week, seen here yesterday for fall yesterday morning 9 AM after missing the couch when she tried to sit down, hitting her head on the wooden floor. Per family pt was discharged at 2 PM yesterday and has been sleeping continuously since then ;  no vomiting; no PO intake.  Family states she hasn't been speaking much but no altered mental status. Not complaining of any pain.  Per family she has not been able to walk since the wall but typically ambulates with walker. Pt was seen and evaluated by Trauma team yesterday and not found to have any acute injuries at the time. Family states that patient told them she took her medications this morning but they were not within arms reach of her bed. Last chemotherapy in November.     PMD; Dr. Luna    Meds:  folic acid, ativan,  amlodipine,  lisinopril, atenolol, oxybutynin, protonix, uro-MP capsule, aspirin, dronabinol, xarelto

## 2018-03-11 NOTE — ED ADULT TRIAGE NOTE - CHIEF COMPLAINT QUOTE
pt brought back to ed for increased lethargy after a fall yesterday. pt was seen yesterday for a fall in am. pt has been experiencing more falls . had a neg ct but her md advised to come in for re-eval

## 2018-03-11 NOTE — ED PROVIDER NOTE - NEUROLOGICAL, MLM
Alert and oriented, no focal deficits, no motor or sensory deficits. Did not attempt ambulating patient.

## 2018-03-11 NOTE — ED ADULT NURSE NOTE - OBJECTIVE STATEMENT
Pt brought in for increased lethargy and decreased eating, pt's daughter states she hasn't eaten anything since yesterday. pt was seen here yesterday after falling and hitting her head and head CT was done. Pt's daughter called PMD and they said to bring pt back to ED. Pt brought in for increased lethargy and decreased eating, pt's daughter states she hasn't eaten anything since yesterday. pt was seen here yesterday after falling and hitting her head and head CT was done and negative, pt discharged home but daughter states pt is not earing or drinking.  Pt's daughter called PMD and they said to bring pt back to ED.

## 2018-03-11 NOTE — H&P ADULT - HISTORY OF PRESENT ILLNESS
62 yo F w/ hx lung ca with brain mets s/p chemo/Xrt presents for generalized weakness and lethargy.  Multiple falls since discharge home feb 2018, recently started on xarelto for LE DVT 1 week prior.  Seen in ER day prior for fall, cleared by trauma service to discharge home.    per family at bedside, patient has had progressive decline since hospitalizations.  appetite mildly improved but weakness worsening with ADL help required.  did not notice melena/blood stool. state oncology states cancer is "stable"    patient has no acute complaints, feels weak.  not very interactive.

## 2018-03-11 NOTE — H&P ADULT - ASSESSMENT
64 yo F w/ hx lung ca with brain mets s/p chemo/Xrt presents for generalized weakness and lethargy.  Multiple falls since discharge home feb 2018, recently started on xarelto for LE DVT 1 week prior.    Found to have acute on chronic anemia, suspect acute GI bleed and hypernatremia/MERCEDES.

## 2018-03-11 NOTE — ED ADULT NURSE NOTE - PMH
Acute deep vein thrombosis (DVT) of femoral vein, unspecified laterality    Brain tumor    Hypertension    Lung cancer  with mets to brain  S/P cardiac catheterization  5 cardiac stents

## 2018-03-11 NOTE — ED ADULT NURSE REASSESSMENT NOTE - NS ED NURSE REASSESS COMMENT FT1
pt status unchanged, refer to flowsheet and chart, pt safety maintained, pt hemodynamically stable, receiving 2nd unit of blood, pending transfer upstairs

## 2018-03-12 LAB
ANION GAP SERPL CALC-SCNC: 14 MMOL/L — SIGNIFICANT CHANGE UP (ref 5–17)
APTT BLD: 28.7 SEC — SIGNIFICANT CHANGE UP (ref 27.5–37.4)
BASOPHILS # BLD AUTO: 0 K/UL — SIGNIFICANT CHANGE UP (ref 0–0.2)
BASOPHILS NFR BLD AUTO: 0.3 % — SIGNIFICANT CHANGE UP (ref 0–2)
BUN SERPL-MCNC: 18 MG/DL — SIGNIFICANT CHANGE UP (ref 8–20)
CALCIUM SERPL-MCNC: 8.9 MG/DL — SIGNIFICANT CHANGE UP (ref 8.6–10.2)
CHLORIDE SERPL-SCNC: 113 MMOL/L — HIGH (ref 98–107)
CO2 SERPL-SCNC: 22 MMOL/L — SIGNIFICANT CHANGE UP (ref 22–29)
CREAT SERPL-MCNC: 1.19 MG/DL — SIGNIFICANT CHANGE UP (ref 0.5–1.3)
EOSINOPHIL # BLD AUTO: 0 K/UL — SIGNIFICANT CHANGE UP (ref 0–0.5)
EOSINOPHIL NFR BLD AUTO: 0.5 % — SIGNIFICANT CHANGE UP (ref 0–6)
GLUCOSE SERPL-MCNC: 87 MG/DL — SIGNIFICANT CHANGE UP (ref 70–115)
HCT VFR BLD CALC: 26.9 % — LOW (ref 37–47)
HGB BLD-MCNC: 9.2 G/DL — LOW (ref 12–16)
INR BLD: 0.99 RATIO — SIGNIFICANT CHANGE UP (ref 0.88–1.16)
LYMPHOCYTES # BLD AUTO: 0.6 K/UL — LOW (ref 1–4.8)
LYMPHOCYTES # BLD AUTO: 14.1 % — LOW (ref 20–55)
MAGNESIUM SERPL-MCNC: 1.8 MG/DL — SIGNIFICANT CHANGE UP (ref 1.6–2.6)
MCHC RBC-ENTMCNC: 32.2 PG — HIGH (ref 27–31)
MCHC RBC-ENTMCNC: 34.2 G/DL — SIGNIFICANT CHANGE UP (ref 32–36)
MCV RBC AUTO: 94.1 FL — SIGNIFICANT CHANGE UP (ref 81–99)
MONOCYTES # BLD AUTO: 0.3 K/UL — SIGNIFICANT CHANGE UP (ref 0–0.8)
MONOCYTES NFR BLD AUTO: 7.8 % — SIGNIFICANT CHANGE UP (ref 3–10)
NEUTROPHILS # BLD AUTO: 3.1 K/UL — SIGNIFICANT CHANGE UP (ref 1.8–8)
NEUTROPHILS NFR BLD AUTO: 77 % — HIGH (ref 37–73)
PHOSPHATE SERPL-MCNC: 2.3 MG/DL — LOW (ref 2.4–4.7)
PLATELET # BLD AUTO: 69 K/UL — LOW (ref 150–400)
POTASSIUM SERPL-MCNC: 2.8 MMOL/L — CRITICAL LOW (ref 3.5–5.3)
POTASSIUM SERPL-SCNC: 2.8 MMOL/L — CRITICAL LOW (ref 3.5–5.3)
PREALB SERPL-MCNC: 18 MG/DL — SIGNIFICANT CHANGE UP (ref 18–38)
PROTHROM AB SERPL-ACNC: 10.9 SEC — SIGNIFICANT CHANGE UP (ref 9.8–12.7)
RBC # BLD: 2.86 M/UL — LOW (ref 4.4–5.2)
RBC # FLD: 18.8 % — HIGH (ref 11–15.6)
SODIUM SERPL-SCNC: 149 MMOL/L — HIGH (ref 135–145)
VIT B12 SERPL-MCNC: 638 PG/ML — SIGNIFICANT CHANGE UP (ref 180–914)
WBC # BLD: 4 K/UL — LOW (ref 4.8–10.8)
WBC # FLD AUTO: 4 K/UL — LOW (ref 4.8–10.8)

## 2018-03-12 PROCEDURE — 99232 SBSQ HOSP IP/OBS MODERATE 35: CPT

## 2018-03-12 PROCEDURE — 99222 1ST HOSP IP/OBS MODERATE 55: CPT

## 2018-03-12 PROCEDURE — 99233 SBSQ HOSP IP/OBS HIGH 50: CPT

## 2018-03-12 PROCEDURE — 93970 EXTREMITY STUDY: CPT | Mod: 26

## 2018-03-12 RX ORDER — DRONABINOL 2.5 MG
5 CAPSULE ORAL
Qty: 0 | Refills: 0 | Status: DISCONTINUED | OUTPATIENT
Start: 2018-03-12 | End: 2018-03-15

## 2018-03-12 RX ORDER — POTASSIUM PHOSPHATE, MONOBASIC POTASSIUM PHOSPHATE, DIBASIC 236; 224 MG/ML; MG/ML
15 INJECTION, SOLUTION INTRAVENOUS ONCE
Qty: 0 | Refills: 0 | Status: COMPLETED | OUTPATIENT
Start: 2018-03-12 | End: 2018-03-12

## 2018-03-12 RX ORDER — POTASSIUM CHLORIDE 20 MEQ
40 PACKET (EA) ORAL ONCE
Qty: 0 | Refills: 0 | Status: COMPLETED | OUTPATIENT
Start: 2018-03-12 | End: 2018-03-12

## 2018-03-12 RX ORDER — DEXTROSE MONOHYDRATE, SODIUM CHLORIDE, AND POTASSIUM CHLORIDE 50; .745; 4.5 G/1000ML; G/1000ML; G/1000ML
1000 INJECTION, SOLUTION INTRAVENOUS
Qty: 0 | Refills: 0 | Status: DISCONTINUED | OUTPATIENT
Start: 2018-03-12 | End: 2018-03-14

## 2018-03-12 RX ORDER — POTASSIUM CHLORIDE 20 MEQ
10 PACKET (EA) ORAL
Qty: 0 | Refills: 0 | Status: DISCONTINUED | OUTPATIENT
Start: 2018-03-12 | End: 2018-03-12

## 2018-03-12 RX ORDER — POTASSIUM CHLORIDE 20 MEQ
40 PACKET (EA) ORAL ONCE
Qty: 0 | Refills: 0 | Status: DISCONTINUED | OUTPATIENT
Start: 2018-03-12 | End: 2018-03-12

## 2018-03-12 RX ADMIN — SODIUM CHLORIDE 100 MILLILITER(S): 9 INJECTION INTRAMUSCULAR; INTRAVENOUS; SUBCUTANEOUS at 00:00

## 2018-03-12 RX ADMIN — POTASSIUM PHOSPHATE, MONOBASIC POTASSIUM PHOSPHATE, DIBASIC 62.5 MILLIMOLE(S): 236; 224 INJECTION, SOLUTION INTRAVENOUS at 18:54

## 2018-03-12 RX ADMIN — ATENOLOL 50 MILLIGRAM(S): 25 TABLET ORAL at 06:14

## 2018-03-12 RX ADMIN — Medication 5 MILLIGRAM(S): at 18:54

## 2018-03-12 RX ADMIN — DEXTROSE MONOHYDRATE, SODIUM CHLORIDE, AND POTASSIUM CHLORIDE 100 MILLILITER(S): 50; .745; 4.5 INJECTION, SOLUTION INTRAVENOUS at 12:20

## 2018-03-12 RX ADMIN — Medication 40 MILLIEQUIVALENT(S): at 18:54

## 2018-03-12 NOTE — PROGRESS NOTE ADULT - SUBJECTIVE AND OBJECTIVE BOX
CC: weakness/falls     INTERVAL HPI/OVERNIGHT EVENTS: Patient seen and examined. States she is still having dark stool. C/O weakness.  Denies chest pain, SOB, dizziness, lightheadedness, nausea, vomiting, fever, chills    Vital Signs Last 24 Hrs  T(C): 36.8 (12 Mar 2018 00:22), Max: 37.1 (11 Mar 2018 18:58)  T(F): 98.2 (12 Mar 2018 00:22), Max: 98.7 (11 Mar 2018 18:58)  HR: 73 (12 Mar 2018 00:22) (73 - 79)  BP: 121/72 (12 Mar 2018 00:22) (121/72 - 137/72)  BP(mean): --  RR: 18 (12 Mar 2018 00:22) (18 - 20)  SpO2: 98% (12 Mar 2018 00:22) (97% - 99%)    PHYSICAL EXAM:    General: Cachectic, frail  Respiratory: No wheezes, rales or rhonchi  Cardiovascular: Regular rate and rhythm. S1 and S2 Normal; No murmurs, gallops or rubs  Gastrointestinal: Soft non-tender non-distended; Normal bowel sounds; No hepatosplenomegaly  Extremities: No edema  Vascular: Peripheral pulses palpable 2+ bilaterally  Neurological: Alert and oriented x4  Skin: Warm and dry. Multiple ecchymotic areas B/L LE    I&O's Detail      CARDIAC MARKERS ( 11 Mar 2018 11:22 )  x     / <0.01 ng/mL / x     / x     / x                                9.2    4.0   )-----------( 69       ( 12 Mar 2018 06:49 )             26.9     12 Mar 2018 06:49    149    |  113    |  18.0   ----------------------------<  87     2.8     |  22.0   |  1.19     Ca    8.9        12 Mar 2018 06:49  Phos  2.3       12 Mar 2018 06:49  Mg     1.8       12 Mar 2018 06:49    TPro  5.6    /  Alb  3.1    /  TBili  0.2    /  DBili  x      /  AST  12     /  ALT  7      /  AlkPhos  80     11 Mar 2018 11:22    PT/INR - ( 12 Mar 2018 06:49 )   PT: 10.9 sec;   INR: 0.99 ratio         PTT - ( 12 Mar 2018 06:49 )  PTT:28.7 sec  CAPILLARY BLOOD GLUCOSE        LIVER FUNCTIONS - ( 11 Mar 2018 11:22 )  Alb: 3.1 g/dL / Pro: 5.6 g/dL / ALK PHOS: 80 U/L / ALT: 7 U/L / AST: 12 U/L / GGT: x               MEDICATIONS  (STANDING):  ATENolol  Tablet 50 milliGRAM(s) Oral daily  dextrose 5% + sodium chloride 0.45% with potassium chloride 20 mEq/L 1000 milliLiter(s) (100 mL/Hr) IV Continuous <Continuous>  dronabinol 5 milliGRAM(s) Oral two times a day  pantoprazole Infusion 8 mG/Hr (10 mL/Hr) IV Continuous <Continuous>  potassium chloride   Powder 40 milliEquivalent(s) Oral once  potassium phosphate IVPB 15 milliMole(s) IV Intermittent once    MEDICATIONS  (PRN):  acetaminophen   Tablet. 650 milliGRAM(s) Oral every 6 hours PRN Mild Pain (1 - 3) or Fever >38C  ALPRAZolam 0.25 milliGRAM(s) Oral three times a day PRN anxiety/nausea  ondansetron Injectable 4 milliGRAM(s) IV Push every 4 hours PRN Nausea and/or Vomiting      RADIOLOGY & ADDITIONAL TESTS:

## 2018-03-12 NOTE — PROGRESS NOTE ADULT - SUBJECTIVE AND OBJECTIVE BOX
INTERVAL HPI/OVERNIGHT EVENTS:FU for acute anemia and Gi bleeding in setting of anticaogulation. One dark colored BM today. CBC stable. No abdominal pain. On clear liquid diet.     MEDICATIONS  (STANDING):  ATENolol  Tablet 50 milliGRAM(s) Oral daily  dextrose 5% + sodium chloride 0.45% with potassium chloride 20 mEq/L 1000 milliLiter(s) (100 mL/Hr) IV Continuous <Continuous>  dronabinol 5 milliGRAM(s) Oral two times a day  pantoprazole Infusion 8 mG/Hr (10 mL/Hr) IV Continuous <Continuous>    MEDICATIONS  (PRN):  acetaminophen   Tablet. 650 milliGRAM(s) Oral every 6 hours PRN Mild Pain (1 - 3) or Fever >38C  ALPRAZolam 0.25 milliGRAM(s) Oral three times a day PRN anxiety/nausea  ondansetron Injectable 4 milliGRAM(s) IV Push every 4 hours PRN Nausea and/or Vomiting      Allergies    codeine (Unknown)  penicillin (Unknown)    Intolerances        Vital Signs Last 24 Hrs  T(C): 36.4 (12 Mar 2018 17:02), Max: 36.8 (12 Mar 2018 00:22)  T(F): 97.6 (12 Mar 2018 17:02), Max: 98.2 (12 Mar 2018 00:22)  HR: 62 (12 Mar 2018 17:02) (62 - 73)  BP: 129/76 (12 Mar 2018 17:02) (121/72 - 129/76)  BP(mean): --  RR: 18 (12 Mar 2018 17:02) (17 - 18)  SpO2: 98% (12 Mar 2018 17:02) (98% - 98%)    LABS:                        9.2    4.0   )-----------( 69       ( 12 Mar 2018 06:49 )             26.9     03-12    149<H>  |  113<H>  |  18.0  ----------------------------<  87  2.8<LL>   |  22.0  |  1.19    Ca    8.9      12 Mar 2018 06:49  Phos  2.3     03-12  Mg     1.8     03-12    TPro  5.6<L>  /  Alb  3.1<L>  /  TBili  0.2<L>  /  DBili  x   /  AST  12  /  ALT  7   /  AlkPhos  80  03-11    PT/INR - ( 12 Mar 2018 06:49 )   PT: 10.9 sec;   INR: 0.99 ratio         PTT - ( 12 Mar 2018 06:49 )  PTT:28.7 sec      RADIOLOGY & ADDITIONAL TESTS:    < from: CT Head No Cont (03.11.18 @ 11:00) >  TECHNIQUE: Noncontrast CT of the head. Multiplanar reformations are   submitted.    FINDINGS: Stable tiny calcification paramedian right frontal lobe on   image 29, series 2. Old small right parietal lobe cortical infarct on   image 31, series 2  Thereis periventricular and subcortical white matter hypodensity without   mass effect, nonspecific, likely representing mild chronic microvascular   ischemic changes. There is no compelling evidence for an acute   transcortical infarction. There is no evidence of mass, mass effect,   midline shift or extra-axial fluid collection. The lateral ventricles and   cortical sulci are age-appropriate in size and configuration. The orbits,   mastoid air cells and visualized paranasal sinuses are unremarkable.The   calvarium is intact.    IMPRESSION:  Mild chronic microvascular changes without evidence of an   acute transcortical infarction or hemorrhage. MR is a more sensitive   imaging modality for the evaluation of an acute infarction.       < end of copied text >

## 2018-03-12 NOTE — CONSULT NOTE ADULT - ATTENDING COMMENTS
COUNSELING:    Face to face meeting to discuss Advanced Care Planning - Time Spent ______ Minutes.  See goals of care note.    More than 50% time spent in counseling and coordinating care. ___40___ Minutes.     Thank you for the opportunity to assist with the care of this patient.   [facility] [phone].

## 2018-03-12 NOTE — CONSULT NOTE ADULT - ASSESSMENT
64yo F Cachectic, Frail, Female with Brain Metastasis- PMH: Lung Ca    Acute on Chronic Anemia Transfused I Unit RBC's  USE INFUSOPORT FOR IV ACCESS    Upper GI Bleed-GI not recommending EGD at this time Clear Liquid diet    Hypernatremia>>MERCEDES Hydration    Anxiety/Depression    Xanax PO prn and standing  Will try to start patient on Cymbalta     Debility-PT Evaluation    Pancytopenia- chemo related    PL: Palliative team has been supporting patient each time she is admitted. I will try to convince patient she needs  Hospice Support at home at this time.    Designating a HCP is also a top priority this admission

## 2018-03-12 NOTE — CONSULT NOTE ADULT - SUBJECTIVE AND OBJECTIVE BOX
HPI: This 62yo F is known to me from recent admissions. PMH Lung Cancer, followed by Brain Mass XRT treatments.  She has been treated for LE DVT with xarelto one week prior to admission. She is much slower cognitively, has been falling is  very weak and frail. Appetite fair. She has melena at present wasn't aware of it at home.  Pale very private person, has been repeatedly refusing to speak to Hospice for support at home, and also would not  designate a HCP. Two daughters are involved in her care.    62 yo F w/ hx lung ca with brain mets s/p chemo/Xrt presents for generalized weakness and lethargy.  Multiple falls since discharge home feb 2018, recently started on xarelto for LE DVT 1 week prior.  Seen in ER day prior for fall, cleared by trauma service to discharge home.    per family at bedside, patient has had progressive decline since hospitalizations.  appetite mildly improved but weakness worsening with ADL help required.  did not notice melena/blood stool. state oncology states cancer is "stable"    patient has no acute complaints, feels weak.  not very interactive. (11 Mar 2018 14:52)    PERTINENT PMH REVIEWED: Yes     PAST MEDICAL & SURGICAL HISTORY:  Acute deep vein thrombosis (DVT) of femoral vein, unspecified laterality  Brain tumor  Lung cancer: with mets to brain  Hypertension  S/P cardiac catheterization: 5 cardiac stents  No significant past surgical history      SOCIAL HISTORY:  EtOH    No                                    Drugs    No                                    smoker  nonsmoker                                    Admitted from: home  Lives with daughter  FAMILY HISTORY:  No pertinent family history in first degree relatives    Allergies  codeine (Unknown)  penicillin (Unknown)    Baseline ADLs (prior to admission):  Becoming more Dependent  with ADL's-weak and falling    Present Symptoms:     Dyspnea:  1 -2    Nausea/Vomiting:  No  Anxiety:  Yes   Depression: Yes   Fatigue: Yes   Loss of appetite: Yes     Pain: Denies            Character-            Duration-            Effect-            Factors-            Frequency-            Location-            Severity-    Review of Systems: Reviewed                       All others negative    MEDICATIONS  (STANDING):  ATENolol  Tablet 50 milliGRAM(s) Oral daily  dextrose 5% + sodium chloride 0.45% with potassium chloride 20 mEq/L 1000 milliLiter(s) (100 mL/Hr) IV Continuous <Continuous>  dronabinol 5 milliGRAM(s) Oral two times a day  pantoprazole Infusion 8 mG/Hr (10 mL/Hr) IV Continuous <Continuous>  potassium chloride   Powder 40 milliEquivalent(s) Oral once  potassium phosphate IVPB 15 milliMole(s) IV Intermittent once    MEDICATIONS  (PRN):  acetaminophen   Tablet. 650 milliGRAM(s) Oral every 6 hours PRN Mild Pain (1 - 3) or Fever >38C  ALPRAZolam 0.25 milliGRAM(s) Oral three times a day PRN anxiety/nausea  ondansetron Injectable 4 milliGRAM(s) IV Push every 4 hours PRN Nausea and/or Vomiting      PHYSICAL EXAM:    Vital Signs Last 24 Hrs  T(C): 36.4 (12 Mar 2018 17:02), Max: 37.1 (11 Mar 2018 18:58)  T(F): 97.6 (12 Mar 2018 17:02), Max: 98.7 (11 Mar 2018 18:58)  HR: 62 (12 Mar 2018 17:02) (62 - 79)  BP: 129/76 (12 Mar 2018 17:02) (121/72 - 137/72)  BP(mean): --  RR: 18 (12 Mar 2018 17:02) (18 - 20)  SpO2: 98% (12 Mar 2018 17:02) (97% - 99%)    General: alert  oriented x 2-3____awake                  cachexia      HEENT:  dry mouth     Lungs:  clear    CV: normal      GI:  distended                   constipation  last BM:     : normal  incontinent      MSK:   weakness  edema              bedbound/wheelchair bound    Skin: normal    no rash    LABS:                        9.2    4.0   )-----------( 69       ( 12 Mar 2018 06:49 )             26.9     03-12    149<H>  |  113<H>  |  18.0  ----------------------------<  87  2.8<LL>   |  22.0  |  1.19    Ca    8.9      12 Mar 2018 06:49  Phos  2.3     03-12  Mg     1.8     03-12    TPro  5.6<L>  /  Alb  3.1<L>  /  TBili  0.2<L>  /  DBili  x   /  AST  12  /  ALT  7   /  AlkPhos  80  03-11    PT/INR - ( 12 Mar 2018 06:49 )   PT: 10.9 sec;   INR: 0.99 ratio         PTT - ( 12 Mar 2018 06:49 )  PTT:28.7 sec    I&O's Summary    RADIOLOGY & ADDITIONAL STUDIES:    ADVANCE DIRECTIVES: Patient refuses to discuss at time of my consult and in the past  DNR  NO  Completed on:                     MOLST   NO   Completed on:  Living Will   NO   Completed on:

## 2018-03-13 DIAGNOSIS — D49.6 NEOPLASM OF UNSPECIFIED BEHAVIOR OF BRAIN: ICD-10-CM

## 2018-03-13 DIAGNOSIS — K92.2 GASTROINTESTINAL HEMORRHAGE, UNSPECIFIED: ICD-10-CM

## 2018-03-13 LAB
ANION GAP SERPL CALC-SCNC: 13 MMOL/L — SIGNIFICANT CHANGE UP (ref 5–17)
BUN SERPL-MCNC: 10 MG/DL — SIGNIFICANT CHANGE UP (ref 8–20)
CALCIUM SERPL-MCNC: 8.6 MG/DL — SIGNIFICANT CHANGE UP (ref 8.6–10.2)
CHLORIDE SERPL-SCNC: 111 MMOL/L — HIGH (ref 98–107)
CO2 SERPL-SCNC: 21 MMOL/L — LOW (ref 22–29)
CREAT SERPL-MCNC: 1.15 MG/DL — SIGNIFICANT CHANGE UP (ref 0.5–1.3)
GLUCOSE SERPL-MCNC: 123 MG/DL — HIGH (ref 70–115)
HCT VFR BLD CALC: 26.8 % — LOW (ref 37–47)
HGB BLD-MCNC: 9.1 G/DL — LOW (ref 12–16)
MAGNESIUM SERPL-MCNC: 1.6 MG/DL — SIGNIFICANT CHANGE UP (ref 1.6–2.6)
MCHC RBC-ENTMCNC: 31.9 PG — HIGH (ref 27–31)
MCHC RBC-ENTMCNC: 34 G/DL — SIGNIFICANT CHANGE UP (ref 32–36)
MCV RBC AUTO: 94 FL — SIGNIFICANT CHANGE UP (ref 81–99)
PHOSPHATE SERPL-MCNC: 2.4 MG/DL — SIGNIFICANT CHANGE UP (ref 2.4–4.7)
PLATELET # BLD AUTO: 60 K/UL — LOW (ref 150–400)
POTASSIUM SERPL-MCNC: 3.9 MMOL/L — SIGNIFICANT CHANGE UP (ref 3.5–5.3)
POTASSIUM SERPL-SCNC: 3.9 MMOL/L — SIGNIFICANT CHANGE UP (ref 3.5–5.3)
RBC # BLD: 2.85 M/UL — LOW (ref 4.4–5.2)
RBC # FLD: 19 % — HIGH (ref 11–15.6)
SODIUM SERPL-SCNC: 145 MMOL/L — SIGNIFICANT CHANGE UP (ref 135–145)
WBC # BLD: 4.7 K/UL — LOW (ref 4.8–10.8)
WBC # FLD AUTO: 4.7 K/UL — LOW (ref 4.8–10.8)

## 2018-03-13 PROCEDURE — 99233 SBSQ HOSP IP/OBS HIGH 50: CPT

## 2018-03-13 PROCEDURE — 99232 SBSQ HOSP IP/OBS MODERATE 35: CPT

## 2018-03-13 RX ORDER — MAGNESIUM SULFATE 500 MG/ML
1 VIAL (ML) INJECTION ONCE
Qty: 0 | Refills: 0 | Status: COMPLETED | OUTPATIENT
Start: 2018-03-13 | End: 2018-03-13

## 2018-03-13 RX ADMIN — ATENOLOL 50 MILLIGRAM(S): 25 TABLET ORAL at 05:24

## 2018-03-13 RX ADMIN — DEXTROSE MONOHYDRATE, SODIUM CHLORIDE, AND POTASSIUM CHLORIDE 100 MILLILITER(S): 50; .745; 4.5 INJECTION, SOLUTION INTRAVENOUS at 11:50

## 2018-03-13 RX ADMIN — Medication 5 MILLIGRAM(S): at 17:08

## 2018-03-13 RX ADMIN — PANTOPRAZOLE SODIUM 10 MG/HR: 20 TABLET, DELAYED RELEASE ORAL at 23:31

## 2018-03-13 RX ADMIN — Medication 100 GRAM(S): at 16:25

## 2018-03-13 RX ADMIN — Medication 5 MILLIGRAM(S): at 05:24

## 2018-03-13 NOTE — PROGRESS NOTE ADULT - SUBJECTIVE AND OBJECTIVE BOX
CC: weakness/falls     INTERVAL HPI/OVERNIGHT EVENTS: Patient seen and examined. No BM today. Reports one dark stool yesterday. Denies chest pain, SOB, dizziness, lightheadedness, nausea, vomiting, fever, chills.     Vital Signs Last 24 Hrs  T(C): 36.8 (13 Mar 2018 09:08), Max: 36.8 (13 Mar 2018 09:08)  T(F): 98.2 (13 Mar 2018 09:08), Max: 98.2 (13 Mar 2018 09:08)  HR: 60 (13 Mar 2018 09:08) (60 - 68)  BP: 127/67 (13 Mar 2018 09:08) (120/67 - 129/76)  BP(mean): --  RR: 18 (13 Mar 2018 09:08) (17 - 18)  SpO2: 99% (13 Mar 2018 09:08) (98% - 99%)    PHYSICAL EXAM:    General: Cachectic, frail  Respiratory: No wheezes, rales or rhonchi  Cardiovascular: Regular rate and rhythm. S1 and S2 Normal; No murmurs, gallops or rubs  Gastrointestinal: Soft non-tender non-distended; Normal bowel sounds; No hepatosplenomegaly  Extremities: No edema  Vascular: Peripheral pulses palpable 2+ bilaterally  Neurological: Alert and oriented x4  Skin: Warm and dry. Multiple ecchymotic areas B/L LE    I&O's Detail    12 Mar 2018 07:01  -  13 Mar 2018 07:00  --------------------------------------------------------  IN:    dextrose 5% + sodium chloride 0.45% with potassium chloride 20 mEq/L: 1900 mL    pantoprazole Infusion: 200 mL    Solution: 125 mL  Total IN: 2225 mL    OUT:  Total OUT: 0 mL    Total NET: 2225 mL                                    9.1    4.7   )-----------( 60       ( 13 Mar 2018 06:54 )             26.8     13 Mar 2018 06:54    145    |  111    |  10.0   ----------------------------<  123    3.9     |  21.0   |  1.15     Ca    8.6        13 Mar 2018 06:54  Phos  2.4       13 Mar 2018 06:54  Mg     1.6       13 Mar 2018 06:54      PT/INR - ( 12 Mar 2018 06:49 )   PT: 10.9 sec;   INR: 0.99 ratio         PTT - ( 12 Mar 2018 06:49 )  PTT:28.7 sec  CAPILLARY BLOOD GLUCOSE              MEDICATIONS  (STANDING):  ATENolol  Tablet 50 milliGRAM(s) Oral daily  dextrose 5% + sodium chloride 0.45% with potassium chloride 20 mEq/L 1000 milliLiter(s) (100 mL/Hr) IV Continuous <Continuous>  dronabinol 5 milliGRAM(s) Oral two times a day  magnesium sulfate  IVPB 1 Gram(s) IV Intermittent once  pantoprazole Infusion 8 mG/Hr (10 mL/Hr) IV Continuous <Continuous>    MEDICATIONS  (PRN):  acetaminophen   Tablet. 650 milliGRAM(s) Oral every 6 hours PRN Mild Pain (1 - 3) or Fever >38C  ALPRAZolam 0.25 milliGRAM(s) Oral three times a day PRN anxiety/nausea  ondansetron Injectable 4 milliGRAM(s) IV Push every 4 hours PRN Nausea and/or Vomiting      RADIOLOGY & ADDITIONAL TESTS:

## 2018-03-13 NOTE — PROGRESS NOTE ADULT - SUBJECTIVE AND OBJECTIVE BOX
INTERVAL HPI/OVERNIGHT EVENTS:    MEDICATIONS  (STANDING):  ATENolol  Tablet 50 milliGRAM(s) Oral daily  dextrose 5% + sodium chloride 0.45% with potassium chloride 20 mEq/L 1000 milliLiter(s) (100 mL/Hr) IV Continuous <Continuous>  dronabinol 5 milliGRAM(s) Oral two times a day  pantoprazole Infusion 8 mG/Hr (10 mL/Hr) IV Continuous <Continuous>    MEDICATIONS  (PRN):  acetaminophen   Tablet. 650 milliGRAM(s) Oral every 6 hours PRN Mild Pain (1 - 3) or Fever >38C  ALPRAZolam 0.25 milliGRAM(s) Oral three times a day PRN anxiety/nausea  ondansetron Injectable 4 milliGRAM(s) IV Push every 4 hours PRN Nausea and/or Vomiting      Allergies    codeine (Unknown)  penicillin (Unknown)    Intolerances        Vital Signs Last 24 Hrs  T(C): 36.8 (13 Mar 2018 09:08), Max: 36.8 (13 Mar 2018 09:08)  T(F): 98.2 (13 Mar 2018 09:08), Max: 98.2 (13 Mar 2018 09:08)  HR: 60 (13 Mar 2018 09:08) (60 - 68)  BP: 127/67 (13 Mar 2018 09:08) (120/67 - 129/76)  BP(mean): --  RR: 18 (13 Mar 2018 09:08) (17 - 18)  SpO2: 99% (13 Mar 2018 09:08) (98% - 99%)    LABS:                        9.1    4.7   )-----------( 60       ( 13 Mar 2018 06:54 )             26.8     03-13    145  |  111<H>  |  10.0  ----------------------------<  123<H>  3.9   |  21.0<L>  |  1.15    Ca    8.6      13 Mar 2018 06:54  Phos  2.4     03-13  Mg     1.6     03-13      PT/INR - ( 12 Mar 2018 06:49 )   PT: 10.9 sec;   INR: 0.99 ratio         PTT - ( 12 Mar 2018 06:49 )  PTT:28.7 sec      RADIOLOGY & ADDITIONAL TESTS: INTERVAL HPI/OVERNIGHT EVENTS:No complaints. No more bleeding reported. Looks weak. Hct stable. No BM today. On clear liquids. Family at bedside    MEDICATIONS  (STANDING):  ATENolol  Tablet 50 milliGRAM(s) Oral daily  dextrose 5% + sodium chloride 0.45% with potassium chloride 20 mEq/L 1000 milliLiter(s) (100 mL/Hr) IV Continuous <Continuous>  dronabinol 5 milliGRAM(s) Oral two times a day  pantoprazole Infusion 8 mG/Hr (10 mL/Hr) IV Continuous <Continuous>    MEDICATIONS  (PRN):  acetaminophen   Tablet. 650 milliGRAM(s) Oral every 6 hours PRN Mild Pain (1 - 3) or Fever >38C  ALPRAZolam 0.25 milliGRAM(s) Oral three times a day PRN anxiety/nausea  ondansetron Injectable 4 milliGRAM(s) IV Push every 4 hours PRN Nausea and/or Vomiting      Allergies    codeine (Unknown)  penicillin (Unknown)    Intolerances        Vital Signs Last 24 Hrs  T(C): 36.8 (13 Mar 2018 09:08), Max: 36.8 (13 Mar 2018 09:08)  T(F): 98.2 (13 Mar 2018 09:08), Max: 98.2 (13 Mar 2018 09:08)  HR: 60 (13 Mar 2018 09:08) (60 - 68)  BP: 127/67 (13 Mar 2018 09:08) (120/67 - 129/76)  BP(mean): --  RR: 18 (13 Mar 2018 09:08) (17 - 18)  SpO2: 99% (13 Mar 2018 09:08) (98% - 99%)    LABS:                        9.1    4.7   )-----------( 60       ( 13 Mar 2018 06:54 )             26.8     03-13    145  |  111<H>  |  10.0  ----------------------------<  123<H>  3.9   |  21.0<L>  |  1.15    Ca    8.6      13 Mar 2018 06:54  Phos  2.4     03-13  Mg     1.6     03-13      PT/INR - ( 12 Mar 2018 06:49 )   PT: 10.9 sec;   INR: 0.99 ratio         PTT - ( 12 Mar 2018 06:49 )  PTT:28.7 sec      RADIOLOGY & ADDITIONAL TESTS:

## 2018-03-13 NOTE — PROGRESS NOTE ADULT - ATTENDING COMMENTS
COUNSELING:    Face to face meeting to discuss Advanced Care Planning - Time Spent ______ Minutes.  See goals of care note.    More than 50% time spent in counseling and coordinating care. __15____ Minutes.     Thank you for the opportunity to assist with the care of this patient.   [facility] [phone]. COUNSELING:    Face to face meeting to discuss Advanced Care Planning - Time Spent ______ Minutes.  See goals of care note.    More than 50% time spent in counseling and coordinating care. __25____ Minutes.     Thank you for the opportunity to assist with the care of this patient.   [facility] [phone].

## 2018-03-13 NOTE — PROGRESS NOTE ADULT - SUBJECTIVE AND OBJECTIVE BOX
INTERVAL HISTORY:  Denies CP,SOB  	  MEDICATIONS:  ATENolol  Tablet 50 milliGRAM(s) Oral daily  acetaminophen   Tablet. 650 milliGRAM(s) Oral every 6 hours PRN  ALPRAZolam 0.25 milliGRAM(s) Oral three times a day PRN  dronabinol 5 milliGRAM(s) Oral two times a day  ondansetron Injectable 4 milliGRAM(s) IV Push every 4 hours PRN  pantoprazole Infusion 8 mG/Hr IV Continuous <Continuous>  dextrose 5% + sodium chloride 0.45% with potassium chloride 20 mEq/L 1000 milliLiter(s) IV Continuous <Continuous>      PHYSICAL EXAM:  T(C): 36.8 (03-13-18 @ 09:08), Max: 36.8 (03-13-18 @ 09:08)  HR: 60 (03-13-18 @ 09:08) (60 - 71)  BP: 127/67 (03-13-18 @ 09:08) (120/67 - 129/76)  RR: 18 (03-13-18 @ 09:08) (17 - 18)  SpO2: 99% (03-13-18 @ 09:08) (98% - 99%)  Wt(kg): --  I&O's Summary    12 Mar 2018 07:01  -  13 Mar 2018 07:00  --------------------------------------------------------  IN: 2225 mL / OUT: 0 mL / NET: 2225 mL      Appearance: Normal		  Cardiovascular: Normal S1 S2, No JVD, No murmurs, No edema  Respiratory: Lungs clear to auscultation	  Psychiatry: A & O x 3, Mood & affect appropriate  Gastrointestinal:  Soft, Non-tender, + BS	  Skin: No rashes, No ecchymoses, No cyanosis  Neurologic: Non-focal  Extremities: Normal range of motion, No clubbing, cyanosis or edema  Vascular: Peripheral pulses palpable 2+ bilaterally      LABS:	 	                  9.1    4.7   )-----------( 60       ( 13 Mar 2018 06:54 )             26.8     03-13    145  |  111<H>  |  10.0  ----------------------------<  123<H>  3.9   |  21.0<L>  |  1.15    Ca    8.6      13 Mar 2018 06:54  Phos  2.4     03-13  Mg     1.6     03-13    TPro  5.6<L>  /  Alb  3.1<L>  /  TBili  0.2<L>  /  DBili  x   /  AST  12  /  ALT  7   /  AlkPhos  80  03-11    ASSESSMENT/PLAN: In summary, DOMENIC CEBALLOS is a 63F a/w weakness/falls (was in ER 1 day PTA with fall->L head trauma), severe anemia receiving PRBCs, pancytopenia, mild ARF, h/o CAD/5 stents (last '08), HTN, stage IV lung cancer with mets to brain & bone s/p chemo/XRT, recent DVT (on Xarelto), recent falls  - No active chest pain, evidence of ischemia, decompensated CHF, significant valvular abnormality, or unstable arrhythmia and therefore no absolute cardiac contraindication to the planned EGD procedure and this may be performed as clinically indicated  - Echocardiogram noted, preserved EF  - Rhythm stable, BP increased.  Resume chronic dose of Atenolol 50 daily for now and titrate PRN.  Amlodipine 10 daily can be restarted if necessary.  Lisinopril held for now given mild ARF->can eventually resume when renal function at baseline and if BP allows.  - ASA 81 & Xarelto 20 daily currently held (patient recently had Plavix d/c'd after being started on AC for DVT)  - PRBCs currently being transfused = monitor Hb closely and re-dose PRN  - Monitor renal function closely  - GI follow-up

## 2018-03-13 NOTE — PROGRESS NOTE ADULT - SUBJECTIVE AND OBJECTIVE BOX
INTERVAL HPI/OVERNIGHT EVENTS: This is a very private 62yo Female who was readmitted after falling again at home. She injured the Rside of headPatient is a     63y old  Female who presents with a chief complaint of weakness/falls (11 Mar 2018 14:52)    Present Symptoms:     Dyspnea: 0   Nausea/Vomiting: No  Anxiety:  Yes   Depression: Yes   Fatigue: Yes   Loss of appetite: Yes     Pain: Denies            Character-            Duration-            Effect-            Factors-            Frequency-            Location-            Severity-    Review of Systems: Reviewed              Unable to obtain due to poor mentation       MEDICATIONS  (STANDING):  ATENolol  Tablet 50 milliGRAM(s) Oral daily  dextrose 5% + sodium chloride 0.45% with potassium chloride 20 mEq/L 1000 milliLiter(s) (100 mL/Hr) IV Continuous <Continuous>  dronabinol 5 milliGRAM(s) Oral two times a day  pantoprazole Infusion 8 mG/Hr (10 mL/Hr) IV Continuous <Continuous>    MEDICATIONS  (PRN):  acetaminophen   Tablet. 650 milliGRAM(s) Oral every 6 hours PRN Mild Pain (1 - 3) or Fever >38C  ALPRAZolam 0.25 milliGRAM(s) Oral three times a day PRN anxiety/nausea  ondansetron Injectable 4 milliGRAM(s) IV Push every 4 hours PRN Nausea and/or Vomiting      PHYSICAL EXAM:    Vital Signs Last 24 Hrs  T(C): 36.8 (13 Mar 2018 09:08), Max: 36.8 (13 Mar 2018 09:08)  T(F): 98.2 (13 Mar 2018 09:08), Max: 98.2 (13 Mar 2018 09:08)  HR: 60 (13 Mar 2018 09:08) (60 - 68)  BP: 127/67 (13 Mar 2018 09:08) (120/67 - 129/76)  BP(mean): --  RR: 18 (13 Mar 2018 09:08) (17 - 18)  SpO2: 99% (13 Mar 2018 09:08) (98% - 99%)    General: alert  oriented x __2-3__Cognitively slow to respond to simple questionsd                  cachexia  nonverbal  coma    Karnofsky:  %    HEENT:   dry mouth      Lungs: comfortable SOB on exertion No wheezing or cough     CV: normal     GI: distended  tender  no BS                 constipation  last BM:     : normal  sharma    MSK: weakness  edema             ambulatory        LABS:                        9.1    4.7   )-----------( 60       ( 13 Mar 2018 06:54 )             26.8     03-13    145  |  111<H>  |  10.0  ----------------------------<  123<H>  3.9   |  21.0<L>  |  1.15    Ca    8.6      13 Mar 2018 06:54  Phos  2.4     03-13  Mg     1.6     03-13      PT/INR - ( 12 Mar 2018 06:49 )   PT: 10.9 sec;   INR: 0.99 ratio         PTT - ( 12 Mar 2018 06:49 )  PTT:28.7 sec    I&O's Summary    12 Mar 2018 07:01  -  13 Mar 2018 07:00  --------------------------------------------------------  IN: 2225 mL / OUT: 0 mL / NET: 2225 mL    RADIOLOGY & ADDITIONAL STUDIES:    ADVANCE DIRECTIVES:   DNR NO  Completed on:                     MOLST   NO   Completed on:  Living Will NO   Completed on: INTERVAL HPI/OVERNIGHT EVENTS: This is a very private 62yo Female PMH of Lung Cancer treated with chemotherapy and RT  She  has a Brain tumor which is making her weaker, gait unsteady and dizzy and  was readmitted after falling again at home.  She injured the R side of her head. Speech is low pitched and slow. She also has an upper GI Bleed needing 2 U Blood.   Poor appetite, denies N/V/D . constipation pain SOB dizziness   63y old  Female who presents with a chief complaint of weakness/falls (11 Mar 2018 14:52)    Present Symptoms:     Dyspnea: 0   Nausea/Vomiting: No  Anxiety:  Yes   Depression: Yes   Fatigue: Yes   Loss of appetite: Yes     Pain: Denies            Character-            Duration-            Effect-            Factors-            Frequency-            Location-            Severity-    Review of Systems: Reviewed              Unable to obtain due to poor mentation       MEDICATIONS  (STANDING):  ATENolol  Tablet 50 milliGRAM(s) Oral daily  dextrose 5% + sodium chloride 0.45% with potassium chloride 20 mEq/L 1000 milliLiter(s) (100 mL/Hr) IV Continuous <Continuous>  dronabinol 5 milliGRAM(s) Oral two times a day  pantoprazole Infusion 8 mG/Hr (10 mL/Hr) IV Continuous <Continuous>    MEDICATIONS  (PRN):  acetaminophen   Tablet. 650 milliGRAM(s) Oral every 6 hours PRN Mild Pain (1 - 3) or Fever >38C  ALPRAZolam 0.25 milliGRAM(s) Oral three times a day PRN anxiety/nausea  ondansetron Injectable 4 milliGRAM(s) IV Push every 4 hours PRN Nausea and/or Vomiting      PHYSICAL EXAM:    Vital Signs Last 24 Hrs  T(C): 36.8 (13 Mar 2018 09:08), Max: 36.8 (13 Mar 2018 09:08)  T(F): 98.2 (13 Mar 2018 09:08), Max: 98.2 (13 Mar 2018 09:08)  HR: 60 (13 Mar 2018 09:08) (60 - 68)  BP: 127/67 (13 Mar 2018 09:08) (120/67 - 129/76)  BP(mean): --  RR: 18 (13 Mar 2018 09:08) (17 - 18)  SpO2: 99% (13 Mar 2018 09:08) (98% - 99%)    General: alert  oriented x __2-3__Cognitively slow to respond to simple questionsd                  cachexia  nonverbal  coma    Karnofsky:  %    HEENT:   dry mouth      Lungs: comfortable SOB on exertion No wheezing or cough     CV: normal     GI: distended                    constipation  last BM:     : normal      MSK: weakness  edema             ambulatory        LABS:                        9.1    4.7   )-----------( 60       ( 13 Mar 2018 06:54 )             26.8     03-13    145  |  111<H>  |  10.0  ----------------------------<  123<H>  3.9   |  21.0<L>  |  1.15    Ca    8.6      13 Mar 2018 06:54  Phos  2.4     03-13  Mg     1.6     03-13      PT/INR - ( 12 Mar 2018 06:49 )   PT: 10.9 sec;   INR: 0.99 ratio         PTT - ( 12 Mar 2018 06:49 )  PTT:28.7 sec    I&O's Summary    12 Mar 2018 07:01  -  13 Mar 2018 07:00  --------------------------------------------------------  IN: 2225 mL / OUT: 0 mL / NET: 2225 mL    RADIOLOGY & ADDITIONAL STUDIES:    ADVANCE DIRECTIVES:   DNR NO  Completed on:                     MOLST   NO   Completed on:  Living Will NO   Completed on:

## 2018-03-14 LAB
ANION GAP SERPL CALC-SCNC: 9 MMOL/L — SIGNIFICANT CHANGE UP (ref 5–17)
BUN SERPL-MCNC: 5 MG/DL — LOW (ref 8–20)
CALCIUM SERPL-MCNC: 8.7 MG/DL — SIGNIFICANT CHANGE UP (ref 8.6–10.2)
CHLORIDE SERPL-SCNC: 113 MMOL/L — HIGH (ref 98–107)
CO2 SERPL-SCNC: 22 MMOL/L — SIGNIFICANT CHANGE UP (ref 22–29)
CREAT SERPL-MCNC: 1.09 MG/DL — SIGNIFICANT CHANGE UP (ref 0.5–1.3)
GLUCOSE SERPL-MCNC: 119 MG/DL — HIGH (ref 70–115)
HCT VFR BLD CALC: 27.2 % — LOW (ref 37–47)
HGB BLD-MCNC: 9.2 G/DL — LOW (ref 12–16)
MAGNESIUM SERPL-MCNC: 1.9 MG/DL — SIGNIFICANT CHANGE UP (ref 1.6–2.6)
MCHC RBC-ENTMCNC: 32.3 PG — HIGH (ref 27–31)
MCHC RBC-ENTMCNC: 33.8 G/DL — SIGNIFICANT CHANGE UP (ref 32–36)
MCV RBC AUTO: 95.4 FL — SIGNIFICANT CHANGE UP (ref 81–99)
PHOSPHATE SERPL-MCNC: 2 MG/DL — LOW (ref 2.4–4.7)
PLATELET # BLD AUTO: 48 K/UL — LOW (ref 150–400)
POTASSIUM SERPL-MCNC: 3.8 MMOL/L — SIGNIFICANT CHANGE UP (ref 3.5–5.3)
POTASSIUM SERPL-SCNC: 3.8 MMOL/L — SIGNIFICANT CHANGE UP (ref 3.5–5.3)
RBC # BLD: 2.85 M/UL — LOW (ref 4.4–5.2)
RBC # FLD: 18.4 % — HIGH (ref 11–15.6)
SODIUM SERPL-SCNC: 144 MMOL/L — SIGNIFICANT CHANGE UP (ref 135–145)
WBC # BLD: 3.2 K/UL — LOW (ref 4.8–10.8)
WBC # FLD AUTO: 3.2 K/UL — LOW (ref 4.8–10.8)

## 2018-03-14 PROCEDURE — 99232 SBSQ HOSP IP/OBS MODERATE 35: CPT

## 2018-03-14 RX ORDER — ASPIRIN/CALCIUM CARB/MAGNESIUM 324 MG
81 TABLET ORAL DAILY
Qty: 0 | Refills: 0 | Status: DISCONTINUED | OUTPATIENT
Start: 2018-03-14 | End: 2018-03-15

## 2018-03-14 RX ORDER — ESCITALOPRAM OXALATE 10 MG/1
5 TABLET, FILM COATED ORAL DAILY
Qty: 0 | Refills: 0 | Status: DISCONTINUED | OUTPATIENT
Start: 2018-03-14 | End: 2018-03-15

## 2018-03-14 RX ORDER — PANTOPRAZOLE SODIUM 20 MG/1
40 TABLET, DELAYED RELEASE ORAL
Qty: 0 | Refills: 0 | Status: DISCONTINUED | OUTPATIENT
Start: 2018-03-14 | End: 2018-03-15

## 2018-03-14 RX ORDER — POTASSIUM PHOSPHATE, MONOBASIC POTASSIUM PHOSPHATE, DIBASIC 236; 224 MG/ML; MG/ML
15 INJECTION, SOLUTION INTRAVENOUS ONCE
Qty: 0 | Refills: 0 | Status: COMPLETED | OUTPATIENT
Start: 2018-03-14 | End: 2018-03-14

## 2018-03-14 RX ADMIN — ATENOLOL 50 MILLIGRAM(S): 25 TABLET ORAL at 05:16

## 2018-03-14 RX ADMIN — Medication 5 MILLIGRAM(S): at 05:16

## 2018-03-14 RX ADMIN — PANTOPRAZOLE SODIUM 40 MILLIGRAM(S): 20 TABLET, DELAYED RELEASE ORAL at 17:25

## 2018-03-14 RX ADMIN — ESCITALOPRAM OXALATE 5 MILLIGRAM(S): 10 TABLET, FILM COATED ORAL at 14:31

## 2018-03-14 RX ADMIN — Medication 81 MILLIGRAM(S): at 14:30

## 2018-03-14 RX ADMIN — POTASSIUM PHOSPHATE, MONOBASIC POTASSIUM PHOSPHATE, DIBASIC 62.5 MILLIMOLE(S): 236; 224 INJECTION, SOLUTION INTRAVENOUS at 22:22

## 2018-03-14 RX ADMIN — Medication 5 MILLIGRAM(S): at 17:25

## 2018-03-14 NOTE — PROGRESS NOTE ADULT - ATTENDING COMMENTS
spoke with daughter in detail  explained frequent hospitalizations, metastatic Ca, gi bleed and overall debility/malnutrition and high risk for further decompensation.  advised to speak to patient at least to get home care and PT, as patient is refusing all else.

## 2018-03-14 NOTE — PROGRESS NOTE ADULT - SUBJECTIVE AND OBJECTIVE BOX
CC: weakness/falls/GI bleed    INTERVAL HPI/OVERNIGHT EVENTS: Patient seen and examined, refusing PT. Appetite remains poor. States food just doesn't taste good. Spoke with patient and is willing to take SSRI. Reports no further melena. Denies chest pain, SOB, dizziness, lightheadedness, nausea, vomiting, fever, chills    Vital Signs Last 24 Hrs  T(C): 36.4 (14 Mar 2018 07:26), Max: 36.8 (13 Mar 2018 15:35)  T(F): 97.5 (14 Mar 2018 07:26), Max: 98.3 (13 Mar 2018 15:35)  HR: 52 (14 Mar 2018 07:26) (52 - 60)  BP: 132/78 (14 Mar 2018 07:26) (130/77 - 157/87)  BP(mean): --  RR: 18 (14 Mar 2018 07:26) (18 - 18)  SpO2: 99% (14 Mar 2018 07:26) (92% - 99%)      PHYSICAL EXAM:    General: Cachectic, frail  Respiratory: No wheezes, rales or rhonchi  Cardiovascular: Regular rate and rhythm. S1 and S2 Normal; No murmurs, gallops or rubs  Gastrointestinal: Soft non-tender non-distended; Normal bowel sounds; No hepatosplenomegaly  Extremities: No edema  Vascular: Peripheral pulses palpable 2+ bilaterally  Neurological: Alert and oriented x4  Skin: Warm and dry. Multiple ecchymotic areas B/L LE    I&O's Detail                                9.2    3.2   )-----------( 48       ( 14 Mar 2018 08:55 )             27.2     14 Mar 2018 08:55    144    |  113    |  5.0    ----------------------------<  119    3.8     |  22.0   |  1.09     Ca    8.7        14 Mar 2018 08:55  Phos  2.0       14 Mar 2018 08:55  Mg     1.9       14 Mar 2018 08:55        CAPILLARY BLOOD GLUCOSE              MEDICATIONS  (STANDING):  aspirin enteric coated 81 milliGRAM(s) Oral daily  ATENolol  Tablet 50 milliGRAM(s) Oral daily  dronabinol 5 milliGRAM(s) Oral two times a day  escitalopram 5 milliGRAM(s) Oral daily  pantoprazole    Tablet 40 milliGRAM(s) Oral two times a day  potassium phosphate IVPB 15 milliMole(s) IV Intermittent once    MEDICATIONS  (PRN):  acetaminophen   Tablet. 650 milliGRAM(s) Oral every 6 hours PRN Mild Pain (1 - 3) or Fever >38C  ALPRAZolam 0.25 milliGRAM(s) Oral three times a day PRN anxiety/nausea  ondansetron Injectable 4 milliGRAM(s) IV Push every 4 hours PRN Nausea and/or Vomiting      RADIOLOGY & ADDITIONAL TESTS:

## 2018-03-14 NOTE — PROGRESS NOTE ADULT - ASSESSMENT
62 yo F w/ hx lung ca with brain mets s/p chemo/Xrt presents for generalized weakness and lethargy.  Multiple falls since discharge home feb 2018, recently started on xarelto for LE DVT 1 week prior.    Found to have acute on chronic anemia, suspect acute GI bleed and hypernatremia/MERCEDES.      Problem/Plan - 1:  ·  Problem: Anemia due to blood loss.  Plan: Advance diet  , oral PPI, S/P PRBC  trend h/h, transfuse for HGB<7  H&H stable, repeat in am, if remains stable then may dc with outpatient follow up in am, discussed with patient's Heme/Onc Dr. Luna  HOLD xarelto. ASA resumed      Problem/Plan - 2:  ·  Problem: Deep vein thrombosis (DVT) of lower extremity, unspecified chronicity, unspecified laterality, unspecified vein.  Plan: repeat duplex negative DVT, spoke with Heme/Onc Dr. Luna, agrees with holding Xarelto for now, will see and evaluate patient after discharge.      Problem/Plan - 3:  ·  Problem: Pancytopenia due to chemotherapy.  Plan: chronic. Follow up Heme/Onc after discharge.      Problem/Plan - 4:  ·  Problem: Debility.  Plan: Patient refusing PT     Problem/Plan - 5:  ·  Problem: Severe protein-calorie malnutrition.  Plan: Start Advance diet, added marinol      Problem/Plan - 6:  Problem: Goals of care, counseling/discussion. Plan: palliative care re- consulted, patient un willing to discuss     Problem/Plan - 7:  ·  Problem: Coronary artery disease involving native coronary artery of native heart without angina pectoris.  Plan: appreciate Cardio input, ASA 81 mg resumed.      Problem/Plan - 8:  ·  Problem: Hypernatremia.  Plan: resolved, IV fluids dc     Problem/Plan - 9:  ·  Problem: MERCEDES (acute kidney injury).  Plan: resolved, IV fluids dc    Problem/Plan-10:  Problem: Electrolyte Imbalance. Plan: replete as needed, check BMP in am
64 yo F w/ hx lung ca with brain mets s/p chemo/Xrt presents for generalized weakness and lethargy.  Multiple falls since discharge home feb 2018, recently started on xarelto for LE DVT 1 week prior.    Found to have acute on chronic anemia, suspect acute GI bleed and hypernatremia/MERCEDES.      Problem/Plan - 1:  ·  Problem: Anemia due to blood loss.  Plan: Advance diet  , IV PPI, S/P PRBC  trend h/h, transfuse for HGB<7  H&H stable, repeat in am, if remains stable then may dc with outpatient follow up  HOLD aspirin/xarelto.      Problem/Plan - 2:  ·  Problem: Deep vein thrombosis (DVT) of lower extremity, unspecified chronicity, unspecified laterality, unspecified vein.  Plan: repeat duplex negative DVT, will call patient's oncologist to verify site of DVT and need to resume AC     Problem/Plan - 3:  ·  Problem: Pancytopenia due to chemotherapy.  Plan: chronic.      Problem/Plan - 4:  ·  Problem: Debility.  Plan: PT evaluation pending     Problem/Plan - 5:  ·  Problem: Severe protein-calorie malnutrition.  Plan: Start Advance diet, add marinol      Problem/Plan - 6:  Problem: Goals of care, counseling/discussion. Plan: palliative care re- consulted.     Problem/Plan - 7:  ·  Problem: Coronary artery disease involving native coronary artery of native heart without angina pectoris.  Plan: hold aspirin. Cardiology input appreciated. DC cardiac monitor     Problem/Plan - 8:  ·  Problem: Hypernatremia.  Plan: resolved, continue IV fluids until taking po well.      Problem/Plan - 9:  ·  Problem: MERCEDES (acute kidney injury).  Plan: improved,  continue IV fluids    Problem/Plan-10:  Problem: Electrolyte Imbalance. Plan: replete as needed, check BMP in am
64 yo F w/ hx lung ca with brain mets s/p chemo/Xrt presents for generalized weakness and lethargy.  Multiple falls since discharge home feb 2018, recently started on xarelto for LE DVT 1 week prior.    Found to have acute on chronic anemia, suspect acute GI bleed and hypernatremia/MERCEDES.      Problem/Plan - 1:  ·  Problem: Anemia due to blood loss.  Plan: Advance diet to CLD, , IV PPI, S/P PRBC  trend h/h, transfuse for HGB<7  GI following, no plans for EGD at this time  HOLD aspirin/xarelto.      Problem/Plan - 2:  ·  Problem: Deep vein thrombosis (DVT) of lower extremity, unspecified chronicity, unspecified laterality, unspecified vein.  Plan: repeat duplex negative DVT     Problem/Plan - 3:  ·  Problem: Pancytopenia due to chemotherapy.  Plan: chronic.      Problem/Plan - 4:  ·  Problem: Debility.  Plan: PT evaluation.      Problem/Plan - 5:  ·  Problem: Severe protein-calorie malnutrition.  Plan: Start CLD, add marinol      Problem/Plan - 6:  Problem: Goals of care, counseling/discussion. Plan: palliative care re- consulted.     Problem/Plan - 7:  ·  Problem: Coronary artery disease involving native coronary artery of native heart without angina pectoris.  Plan: hold aspirin. Cardiology input appreciated. DC cardiac monitor     Problem/Plan - 8:  ·  Problem: Hypernatremia.  Plan: iv f.      Problem/Plan - 9:  ·  Problem: MERCEDES (acute kidney injury).  Plan: improved continue IV fluids    Problem/Plan-10:  Problem: Hypokalemia. Plan: replete K, check BMP in am
64yo F with Metastatic Lung Cancer with Brain Mass    Dehydration -drink more water.    Increasing weakness, falling at home Unsteady Gait    Symptomatic Anemia  (2 Units of Blood Transfused)    Anxiety-on Xanax NPO Today- Use Ativan 0.25mg IV 2x/d prn while NPO      Depression-has isolated herself, will not designate   her HCP .  Asked me to leave her alone.  Asked if she woukd like to speak to  Steve she agreed
Patient with GI bleeding which has stopped. No evidence of DVT to continue anticoagulation. Patient does not want any endoscopic work up and I agree with that due to poor pulmonary and general status    1. Change PPI to oral bid  2. Advance diet as tolerated  3. No endoscopy planned at this time  4. GI will follow up
Patient with GI bleeding,acute anemia in setting of anticoagulation, doing better after PRBC transfusion    1. Obtain pulmonary evaluation  2. Continue clear liquid diet. PPI infusion  3. Possible EGD on wednesday or thursday if family agrees and patient remains stable

## 2018-03-14 NOTE — PROGRESS NOTE ADULT - SUBJECTIVE AND OBJECTIVE BOX
[facility], [facility]                                          [address], [address], [place] [address]                                               PHONE: [phone]    FAX: [phone] [address], [address], [place] [address]                                       PHONE: [phone]    FAX: [phone]  *******************************************************************************    Overnight events/Subjective Assessment: Denies abd pain/CP/SOB/palpitations.  + weakness/malaise      codeine (Unknown)  penicillin (Unknown)    MEDICATIONS  (STANDING):  ATENolol  Tablet 50 milliGRAM(s) Oral daily  dextrose 5% + sodium chloride 0.45% with potassium chloride 20 mEq/L 1000 milliLiter(s) (100 mL/Hr) IV Continuous <Continuous>  dronabinol 5 milliGRAM(s) Oral two times a day  pantoprazole Infusion 8 mG/Hr (10 mL/Hr) IV Continuous <Continuous>    MEDICATIONS  (PRN):  acetaminophen   Tablet. 650 milliGRAM(s) Oral every 6 hours PRN Mild Pain (1 - 3) or Fever >38C  ALPRAZolam 0.25 milliGRAM(s) Oral three times a day PRN anxiety/nausea  ondansetron Injectable 4 milliGRAM(s) IV Push every 4 hours PRN Nausea and/or Vomiting      Vital Signs Last 24 Hrs  T(C): 36.4 (14 Mar 2018 07:26), Max: 36.8 (13 Mar 2018 09:08)  T(F): 97.5 (14 Mar 2018 07:26), Max: 98.3 (13 Mar 2018 15:35)  HR: 52 (14 Mar 2018 07:26) (52 - 60)  BP: 132/78 (14 Mar 2018 07:26) (127/67 - 157/87)  BP(mean): --  RR: 18 (14 Mar 2018 07:26) (18 - 18)  SpO2: 99% (14 Mar 2018 07:26) (92% - 99%)    I&O's Detail    I&O's Summary          PHYSICAL EXAM:  General: Chronically ill-appearing.  NAD.  HEENT: Head: normocephalic, atraumatic  Eyes: Pupils equal and reactive  Neck: Supple, no carotid bruit, no JVD, no HJR  CARDIOVASCULAR: Normal S1 and S2, 1/6 PARAS  LUNGS: Clear to auscultation bilaterally, no rales, rhonchi or wheeze  ABDOMEN: Soft, nontender, non-distended, positive bowel sounds, no mass or bruit  EXTREMITIES: No edema, distal pulses WNL  SKIN: Warm and dry with normal turgor  NEURO: Alert & oriented x 3, grossly intact  PSYCH: normal mood and affect        LABS:                        9.1    4.7   )-----------( 60       ( 13 Mar 2018 06:54 )             26.8     03-13    145  |  111<H>  |  10.0  ----------------------------<  123<H>  3.9   |  21.0<L>  |  1.15    Ca    8.6      13 Mar 2018 06:54  Phos  2.4     03-13  Mg     1.6     03-13            serum  Lipids:         RADIOLOGY & ADDITIONAL STUDIES:  < from: US Duplex Venous Lower Ext Complete, Bilateral (03.12.18 @ 10:40) >    IMPRESSION:     No evidence of bilateral lower extremity deep venous thrombosis.    < end of copied text >      ECHO:< from: TTE Echo Complete w/Doppler (03.12.18 @ 10:43) >  PHYSICIAN INTERPRETATION:  Left Ventricle: The left ventricular internal cavity size is normal.  Global LV systolic function was normal. Left ventricular ejection   fraction, by visual estimation, is 50 to 55%. Spectral Doppler shows   impaired relaxation pattern of left ventricularmyocardial filling (Grade   I diastolic dysfunction).  Right Ventricle: Normal right ventricular size and function.  Left Atrium: The left atrium is normal in size.  Right Atrium: The right atrium is normal in size.  Pericardium: There is no evidenceof pericardial effusion.  Mitral Valve: Thickening of the anterior and posterior mitral valve   leaflets. Mild to moderate mitral valve regurgitation is seen.  Tricuspid Valve: Mild tricuspid regurgitation is visualized.  Aortic Valve: The aortic valve is trileaflet. Sclerotic aortic valve with   normal opening. Peak transaortic gradient equals 5.6 mmHg, mean   transaortic gradient equals 3.0 mmHg, the calculated aortic valve area   equals 2.04 cm² by the continuity equation consistent with mild aortic   stenosis. No evidence of aortic valve regurgitation is seen. Sclerotic   aortic valve with normal opening.  Pulmonic Valve: Trace pulmonic valve regurgitation.  Aorta: The aortic root is normal in size and structure. Questionable   llambl's visualized on the aortic valve.  Pulmonary Artery: The main pulmonary artery is normal in size.  Venous: The inferior vena cava was normal sized, with respiratory size   variation greater than 50%.       Summary:   1. Left ventricular ejection fraction, by visual estimation, is 50 to   55%.   2. Normal global left ventricular systolic function.   3. Normal left ventricular internal cavity size.   4. Spectral Doppler shows impaired relaxation pattern of left   ventricular myocardial filling (Grade Idiastolic dysfunction).   5. Normal right ventricular size and function.   6. There is no evidence of pericardial effusion.   7. Mild to moderate mitral valve regurgitation.   8. Thickening of the anterior and posterior mitral valve leaflets.   9. Sclerotic aortic valve with normal opening.  10. Questionable llambl's visualized on the aortic valve.  11. The main pulmonary artery is normal in size.  12. Peak transaortic gradient equals 5.6 mmHg, mean transaortic gradient   equals 3.0 mmHg, the calculated aortic valve area equals 2.04 cm² by the   continuity equation consistent with mild aortic stenosis.    < end of copied text >        ASSESSMENT AND PLAN:  DOMENIC CEBALLOS is a 63F a/w weakness/falls (was in ER 1 day PTA with fall->L head trauma), severe anemia receiving PRBCs, pancytopenia, mild ARF, h/o CAD/5 stents (last '08), HTN, stage IV lung cancer with mets to brain & bone s/p chemo/XRT, recent DVT (on Xarelto), recent falls    - No active chest pain, evidence of ischemia, decompensated CHF, significant valvular abnormality, or unstable arrhythmia and therefore no absolute cardiac contraindication to the planned EGD procedure and this may be performed as clinically indicated  - Echocardiogram 3/12/18 EF 50-55%, impaired LV relaxation, mild-mod MR, mild AS  - Rhythm stable, BP stable.  Continue Atenolol 50 daily for now and titrate PRN.  Lisinopril held for now given mild ARF->can eventually resume when renal function at baseline and if BP allows.  - ASA 81 & Xarelto 20 daily currently held (patient recently had Plavix d/c'd after being started on AC for DVT)  - LE dopplers 3/12/18 negative for DVT.  In setting of bleeding, agree that it is reasonable to discontinue Xarelto.  - PRBCs currently being transfused = monitor Hb closely and re-dose PRN  - Monitor renal function closely  - GI follow-up; no current plan for EGD as per pt's wishes  - Goals of care to be established.  Would recommend conservative care.  - She does have prior coronary stents and ASA 81mg daily should be resumed ASAP.   I discussed the increased risk of bleeding with the patient.  She is not a candidate for invasive cardiac procedures.    - D/w Dr. Arias    Will sign off for now and follow prn.  Please call with any CV questions/concerns.      Ricci Soto MD

## 2018-03-15 ENCOUNTER — TRANSCRIPTION ENCOUNTER (OUTPATIENT)
Age: 63
End: 2018-03-15

## 2018-03-15 VITALS
SYSTOLIC BLOOD PRESSURE: 154 MMHG | HEART RATE: 50 BPM | OXYGEN SATURATION: 96 % | TEMPERATURE: 98 F | DIASTOLIC BLOOD PRESSURE: 84 MMHG | RESPIRATION RATE: 18 BRPM

## 2018-03-15 LAB
ANION GAP SERPL CALC-SCNC: 13 MMOL/L — SIGNIFICANT CHANGE UP (ref 5–17)
BUN SERPL-MCNC: 5 MG/DL — LOW (ref 8–20)
CALCIUM SERPL-MCNC: 8.8 MG/DL — SIGNIFICANT CHANGE UP (ref 8.6–10.2)
CHLORIDE SERPL-SCNC: 110 MMOL/L — HIGH (ref 98–107)
CO2 SERPL-SCNC: 23 MMOL/L — SIGNIFICANT CHANGE UP (ref 22–29)
CREAT SERPL-MCNC: 1.29 MG/DL — SIGNIFICANT CHANGE UP (ref 0.5–1.3)
GLUCOSE SERPL-MCNC: 81 MG/DL — SIGNIFICANT CHANGE UP (ref 70–115)
HCT VFR BLD CALC: 27.2 % — LOW (ref 37–47)
HGB BLD-MCNC: 9.1 G/DL — LOW (ref 12–16)
MAGNESIUM SERPL-MCNC: 1.8 MG/DL — SIGNIFICANT CHANGE UP (ref 1.6–2.6)
MCHC RBC-ENTMCNC: 31.6 PG — HIGH (ref 27–31)
MCHC RBC-ENTMCNC: 33.5 G/DL — SIGNIFICANT CHANGE UP (ref 32–36)
MCV RBC AUTO: 94.4 FL — SIGNIFICANT CHANGE UP (ref 81–99)
PHOSPHATE SERPL-MCNC: 3.7 MG/DL — SIGNIFICANT CHANGE UP (ref 2.4–4.7)
PLATELET # BLD AUTO: 50 K/UL — LOW (ref 150–400)
POTASSIUM SERPL-MCNC: 3.4 MMOL/L — LOW (ref 3.5–5.3)
POTASSIUM SERPL-SCNC: 3.4 MMOL/L — LOW (ref 3.5–5.3)
RBC # BLD: 2.88 M/UL — LOW (ref 4.4–5.2)
RBC # FLD: 17.7 % — HIGH (ref 11–15.6)
SODIUM SERPL-SCNC: 146 MMOL/L — HIGH (ref 135–145)
WBC # BLD: 2.7 K/UL — LOW (ref 4.8–10.8)
WBC # FLD AUTO: 2.7 K/UL — LOW (ref 4.8–10.8)

## 2018-03-15 PROCEDURE — 84484 ASSAY OF TROPONIN QUANT: CPT

## 2018-03-15 PROCEDURE — 85730 THROMBOPLASTIN TIME PARTIAL: CPT

## 2018-03-15 PROCEDURE — 99285 EMERGENCY DEPT VISIT HI MDM: CPT | Mod: 25

## 2018-03-15 PROCEDURE — 85610 PROTHROMBIN TIME: CPT

## 2018-03-15 PROCEDURE — 84134 ASSAY OF PREALBUMIN: CPT

## 2018-03-15 PROCEDURE — 87040 BLOOD CULTURE FOR BACTERIA: CPT

## 2018-03-15 PROCEDURE — 86901 BLOOD TYPING SEROLOGIC RH(D): CPT

## 2018-03-15 PROCEDURE — 96374 THER/PROPH/DIAG INJ IV PUSH: CPT

## 2018-03-15 PROCEDURE — 96376 TX/PRO/DX INJ SAME DRUG ADON: CPT

## 2018-03-15 PROCEDURE — 93005 ELECTROCARDIOGRAM TRACING: CPT

## 2018-03-15 PROCEDURE — 85027 COMPLETE CBC AUTOMATED: CPT

## 2018-03-15 PROCEDURE — 93970 EXTREMITY STUDY: CPT

## 2018-03-15 PROCEDURE — 82150 ASSAY OF AMYLASE: CPT

## 2018-03-15 PROCEDURE — 36415 COLL VENOUS BLD VENIPUNCTURE: CPT

## 2018-03-15 PROCEDURE — 83735 ASSAY OF MAGNESIUM: CPT

## 2018-03-15 PROCEDURE — 70450 CT HEAD/BRAIN W/O DYE: CPT

## 2018-03-15 PROCEDURE — 83605 ASSAY OF LACTIC ACID: CPT

## 2018-03-15 PROCEDURE — P9016: CPT

## 2018-03-15 PROCEDURE — 80053 COMPREHEN METABOLIC PANEL: CPT

## 2018-03-15 PROCEDURE — 71045 X-RAY EXAM CHEST 1 VIEW: CPT

## 2018-03-15 PROCEDURE — 80048 BASIC METABOLIC PNL TOTAL CA: CPT

## 2018-03-15 PROCEDURE — 99284 EMERGENCY DEPT VISIT MOD MDM: CPT | Mod: 25

## 2018-03-15 PROCEDURE — 86850 RBC ANTIBODY SCREEN: CPT

## 2018-03-15 PROCEDURE — 83690 ASSAY OF LIPASE: CPT

## 2018-03-15 PROCEDURE — 84100 ASSAY OF PHOSPHORUS: CPT

## 2018-03-15 PROCEDURE — 36430 TRANSFUSION BLD/BLD COMPNT: CPT

## 2018-03-15 PROCEDURE — 72125 CT NECK SPINE W/O DYE: CPT

## 2018-03-15 PROCEDURE — 86900 BLOOD TYPING SEROLOGIC ABO: CPT

## 2018-03-15 PROCEDURE — 86923 COMPATIBILITY TEST ELECTRIC: CPT

## 2018-03-15 PROCEDURE — 80307 DRUG TEST PRSMV CHEM ANLYZR: CPT

## 2018-03-15 PROCEDURE — 99239 HOSP IP/OBS DSCHRG MGMT >30: CPT

## 2018-03-15 PROCEDURE — 82607 VITAMIN B-12: CPT

## 2018-03-15 PROCEDURE — 82272 OCCULT BLD FECES 1-3 TESTS: CPT

## 2018-03-15 RX ORDER — POTASSIUM CHLORIDE 20 MEQ
40 PACKET (EA) ORAL ONCE
Qty: 0 | Refills: 0 | Status: COMPLETED | OUTPATIENT
Start: 2018-03-15 | End: 2018-03-15

## 2018-03-15 RX ORDER — ESCITALOPRAM OXALATE 10 MG/1
1 TABLET, FILM COATED ORAL
Qty: 30 | Refills: 0 | OUTPATIENT
Start: 2018-03-15 | End: 2018-04-13

## 2018-03-15 RX ORDER — ACETAMINOPHEN 500 MG
2 TABLET ORAL
Qty: 0 | Refills: 0 | COMMUNITY
Start: 2018-03-15

## 2018-03-15 RX ORDER — DRONABINOL 2.5 MG
1 CAPSULE ORAL
Qty: 60 | Refills: 0 | OUTPATIENT
Start: 2018-03-15 | End: 2018-04-13

## 2018-03-15 RX ORDER — RIVAROXABAN 15 MG-20MG
1 KIT ORAL
Qty: 0 | Refills: 0 | COMMUNITY

## 2018-03-15 RX ADMIN — Medication 5 MILLIGRAM(S): at 06:11

## 2018-03-15 RX ADMIN — ATENOLOL 50 MILLIGRAM(S): 25 TABLET ORAL at 06:11

## 2018-03-15 RX ADMIN — Medication 40 MILLIEQUIVALENT(S): at 12:10

## 2018-03-15 RX ADMIN — Medication 81 MILLIGRAM(S): at 12:10

## 2018-03-15 RX ADMIN — PANTOPRAZOLE SODIUM 40 MILLIGRAM(S): 20 TABLET, DELAYED RELEASE ORAL at 06:11

## 2018-03-15 RX ADMIN — ESCITALOPRAM OXALATE 5 MILLIGRAM(S): 10 TABLET, FILM COATED ORAL at 12:10

## 2018-03-15 NOTE — DISCHARGE NOTE ADULT - MEDICATION SUMMARY - MEDICATIONS TO STOP TAKING
I will STOP taking the medications listed below when I get home from the hospital:    lisinopril 40 mg oral tablet  -- 1 tab(s) by mouth once a day  HOLD FOR 3 days    amLODIPine 10 mg oral tablet  -- 1 tab(s) by mouth once a day    clopidogrel 75 mg oral tablet  -- 1 tab(s) by mouth once a day    metoclopramide 10 mg oral tablet  -- 1 tab(s) by mouth 4 times a day (before meals and at bedtime), As Needed    hydrALAZINE 25 mg oral tablet  -- 1 tab(s) by mouth every 8 hours, As needed, Systolic blood pressure >150    potassium chloride 20 mEq oral tablet, extended release  -- 1 tab(s) by mouth once a day    rivaroxaban 15 mg oral tablet  -- 1 tab(s) by mouth once a day (in the evening)

## 2018-03-15 NOTE — DISCHARGE NOTE ADULT - MEDICATION SUMMARY - MEDICATIONS TO TAKE
I will START or STAY ON the medications listed below when I get home from the hospital:    aspirin 81 mg oral delayed release tablet  -- 1 tab(s) by mouth once a day  -- Indication: For Coronary artery disease involving native coronary artery of native heart without angina pectoris    acetaminophen 325 mg oral tablet  -- 2 tab(s) by mouth every 6 hours, As needed, Mild Pain (1 - 3) or Fever >38C  -- Indication: For Pain/fever    Ativan 0.5 mg oral tablet  -- 1 tab(s) by mouth 2 times a day, As Needed MDD:2  -- Caution federal law prohibits the transfer of this drug to any person other  than the person for whom it was prescribed.  Do not take this drug if you are pregnant.  May cause drowsiness.  Alcohol may intensify this effect.  Use care when operating dangerous machinery.    -- Indication: For Anxiety    escitalopram 5 mg oral tablet  -- 1 tab(s) by mouth once a day  -- Indication: For Depression    dronabinol 5 mg oral capsule  -- 1 cap(s) by mouth 2 times a day MDD:2  -- Indication: For Appetite stimulant    atenolol 50 mg oral tablet  -- 1 tab(s) by mouth once a day  -- Indication: For Coronary artery disease involving native coronary artery of native heart without angina pectoris    folic acid 1 mg oral tablet  -- 1 tab(s) by mouth once a day  -- Indication: For vitamin

## 2018-03-15 NOTE — DISCHARGE NOTE ADULT - CARE PROVIDER_API CALL
Ricci Pitt (MD), Cardiovascular Disease; Internal Medicine  [address]  [address]  [place] [address]  Phone: [phone]  Fax: [phone]    Dr. Luna, Heme/ONC  Phone: (   )    -  Fax: (   )    -    Elizabeth Pearce (DO), Gastroenterology  [address]  [address]  [place] [address]  Phone: [phone]  Fax: [phone]

## 2018-03-15 NOTE — DISCHARGE NOTE ADULT - MEDICATION SUMMARY - MEDICATIONS TO CHANGE
I will SWITCH the dose or number of times a day I take the medications listed below when I get home from the hospital:    dronabinol 2.5 mg oral capsule  -- 1 cap(s) by mouth 2 times a day MDD:2 cap

## 2018-03-15 NOTE — DISCHARGE NOTE ADULT - SECONDARY DIAGNOSIS.
Anemia due to blood loss Deep vein thrombosis (DVT) of lower extremity, unspecified chronicity, unspecified laterality, unspecified vein Pancytopenia MERCEDES (acute kidney injury) Hypernatremia Severe protein-calorie malnutrition

## 2018-03-15 NOTE — DISCHARGE NOTE ADULT - PATIENT PORTAL LINK FT
You can access the [facility] Patient Portal, offered by [facility], by registering with the following website: [email or web address]

## 2018-03-15 NOTE — DISCHARGE NOTE ADULT - CARE PROVIDERS DIRECT ADDRESSES
,DirectAddress_Unknown,DirectAddress_Unknown,[email or web address]

## 2018-03-15 NOTE — DISCHARGE NOTE ADULT - CARE PLAN
Principal Discharge DX:	Upper GI bleed  Goal:	No further bleeding  Assessment and plan of treatment:	Stop Xarelto  Repeat CBC with Dr. Luna, call for appointment after discharge  GI follow up as needed  Secondary Diagnosis:	Anemia due to blood loss  Assessment and plan of treatment:	as above  Secondary Diagnosis:	Deep vein thrombosis (DVT) of lower extremity, unspecified chronicity, unspecified laterality, unspecified vein  Assessment and plan of treatment:	No evidence of DVT on Ultrasound performed 3/12/18  Stop Xarelto  Continue ASA  Secondary Diagnosis:	Pancytopenia  Assessment and plan of treatment:	Follow up with Dr. Luna  Secondary Diagnosis:	MERCEDES (acute kidney injury)  Assessment and plan of treatment:	Resolved  Avoid nephrotoxic drugs  Increase oral fluid intake  Repeat BMP with Dr. Luna  Secondary Diagnosis:	Hypernatremia  Assessment and plan of treatment:	Resolved  Increase fluid intake  Secondary Diagnosis:	Severe protein-calorie malnutrition  Assessment and plan of treatment:	Marinol  Lexapro Principal Discharge DX:	Upper GI bleed  Goal:	No further bleeding  Assessment and plan of treatment:	Stop Xarelto  Repeat CBC with Dr. Luna, call for appointment after discharge  GI follow up as needed  Secondary Diagnosis:	Anemia due to blood loss  Assessment and plan of treatment:	acute blood loss anemia  as above  Secondary Diagnosis:	Deep vein thrombosis (DVT) of lower extremity, unspecified chronicity, unspecified laterality, unspecified vein  Assessment and plan of treatment:	No evidence of DVT on Ultrasound performed 3/12/18  Stop Xarelto  Continue ASA  Secondary Diagnosis:	Pancytopenia  Assessment and plan of treatment:	Follow up with Dr. Luna  Secondary Diagnosis:	MERCEDES (acute kidney injury)  Assessment and plan of treatment:	Resolved  Avoid nephrotoxic drugs  Increase oral fluid intake  Repeat BMP with Dr. Luna  Secondary Diagnosis:	Hypernatremia  Assessment and plan of treatment:	Resolved  Increase fluid intake  Secondary Diagnosis:	Severe protein-calorie malnutrition  Assessment and plan of treatment:	Marinol  Lexapro

## 2018-03-15 NOTE — DISCHARGE NOTE ADULT - PROVIDER TOKENS
TOKEN:'[id number]',FREE:[LAST:[Dr. Luna],FIRST:[Heme/ONC],PHONE:[(   )    -],FAX:[(   )    -]],TOKEN:'[id number]'

## 2018-03-15 NOTE — DISCHARGE NOTE ADULT - HOSPITAL COURSE
64 yo F w/ hx lung ca with brain mets s/p chemo/Xrt presented for generalized weakness and lethargy. Multiple falls since discharge home feb 2018, recently started on xarelto for LE DVT 1 week prior to admission. Found to have acute on chronic anemia, probable GI bleed and hypernatremia/MERCEDES. Xarelto and aspirin held, transfused 2 units PRBC. H&H stable at 9.1/27.2. Followed by GI. No more bleeding reported. Repeat US B/L LE with No evidence of DVT , discussed with Heme/Onc, Dr. Luna, will hold  anticoagulation for now. Patient does not want any endoscopic work up. Diet was advanced, given Protonix. Can follow up with GI as outpatient if needed.  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 Follow up Heme/Onc after discharge. Patient appetite poor with  Severe protein-calorie malnutrition. Diet liberalized, increase Marinol to 5 mg BID. The patient agreed to start Lexapro for mood and appetite stimulation. Patient was followed by Cardiology and Aspirin was resumed as H&H remained stable. MERCEDES and electrolyte imbalance was resolved with IV fluids and supplementation. Discussed with family and patient reason for  frequent hospitalizations, metastatic Ca, gi bleed and overall debility/malnutrition and high risk for further decompensation. Patient refusing physical therapy and home services.     Vital Signs Last 24 Hrs  T(C): 36.6 (15 Mar 2018 07:18), Max: 37.1 (14 Mar 2018 15:20)  T(F): 97.9 (15 Mar 2018 07:18), Max: 98.7 (14 Mar 2018 15:20)  HR: 50 (15 Mar 2018 07:18) (50 - 58)  BP: 154/84 (15 Mar 2018 07:18) (128/76 - 154/84)  BP(mean): --  RR: 18 (15 Mar 2018 07:18) (18 - 18)  SpO2: 96% (15 Mar 2018 07:18) (96% - 100%)                            9.1    2.7   )-----------( 50       ( 15 Mar 2018 06:27 )             27.2     15 Mar 2018 06:27    146    |  110    |  5.0    ----------------------------<  81     3.4     |  23.0   |  1.29     Ca    8.8        15 Mar 2018 06:27  Phos  3.7       15 Mar 2018 06:27  Mg     1.8       15 Mar 2018 06:27        CAPILLARY BLOOD GLUCOSE          PHYSICAL EXAM:    General: Cachectic, frail  Respiratory: No wheezes, rales or rhonchi  Cardiovascular: Regular rate and rhythm. S1 and S2 Normal; No murmurs, gallops or rubs  Gastrointestinal: Soft non-tender non-distended; Normal bowel sounds; No hepatosplenomegaly  Extremities: No edema  Vascular: Peripheral pulses palpable 2+ bilaterally  Neurological: Alert and oriented x4  Skin: Warm and dry. Multiple ecchymotic areas B/L LE

## 2018-03-15 NOTE — DISCHARGE NOTE ADULT - PLAN OF CARE
No further bleeding Stop Xarelto  Repeat CBC with Dr. Luna, call for appointment after discharge  GI follow up as needed as above No evidence of DVT on Ultrasound performed 3/12/18  Stop Xarelto  Continue ASA Follow up with Dr. Luna Resolved  Avoid nephrotoxic drugs  Increase oral fluid intake  Repeat BMP with Dr. Luna Resolved  Increase fluid intake Marinol  Lexapro acute blood loss anemia  as above

## 2018-03-16 LAB
CULTURE RESULTS: SIGNIFICANT CHANGE UP
SPECIMEN SOURCE: SIGNIFICANT CHANGE UP

## 2018-05-09 ENCOUNTER — EMERGENCY (EMERGENCY)
Facility: HOSPITAL | Age: 63
LOS: 1 days | Discharge: HOME | End: 2018-05-09
Attending: EMERGENCY MEDICINE
Payer: MEDICARE

## 2018-05-09 VITALS
RESPIRATION RATE: 20 BRPM | HEART RATE: 60 BPM | TEMPERATURE: 97 F | SYSTOLIC BLOOD PRESSURE: 138 MMHG | OXYGEN SATURATION: 100 % | DIASTOLIC BLOOD PRESSURE: 81 MMHG

## 2018-05-09 VITALS — WEIGHT: 104.94 LBS | HEIGHT: 64 IN

## 2018-05-09 LAB
ACANTHOCYTES BLD QL SMEAR: SLIGHT — SIGNIFICANT CHANGE UP
ALBUMIN SERPL ELPH-MCNC: 2.4 G/DL — LOW (ref 3.3–5.2)
ALP SERPL-CCNC: 145 U/L — HIGH (ref 40–120)
ALT FLD-CCNC: 9 U/L — SIGNIFICANT CHANGE UP
ANION GAP SERPL CALC-SCNC: 12 MMOL/L — SIGNIFICANT CHANGE UP (ref 5–17)
APPEARANCE UR: CLEAR — SIGNIFICANT CHANGE UP
AST SERPL-CCNC: 15 U/L — SIGNIFICANT CHANGE UP
BACTERIA # UR AUTO: ABNORMAL
BASOPHILS NFR BLD AUTO: 1 % — SIGNIFICANT CHANGE UP (ref 0–2)
BILIRUB SERPL-MCNC: 0.4 MG/DL — SIGNIFICANT CHANGE UP (ref 0.4–2)
BILIRUB UR-MCNC: NEGATIVE — SIGNIFICANT CHANGE UP
BUN SERPL-MCNC: 13 MG/DL — SIGNIFICANT CHANGE UP (ref 8–20)
BURR CELLS BLD QL SMEAR: PRESENT — SIGNIFICANT CHANGE UP
CALCIUM SERPL-MCNC: 8.6 MG/DL — SIGNIFICANT CHANGE UP (ref 8.6–10.2)
CHLORIDE SERPL-SCNC: 106 MMOL/L — SIGNIFICANT CHANGE UP (ref 98–107)
CO2 SERPL-SCNC: 23 MMOL/L — SIGNIFICANT CHANGE UP (ref 22–29)
COLOR SPEC: ABNORMAL
COMMENT - URINE: SIGNIFICANT CHANGE UP
CREAT SERPL-MCNC: 1.48 MG/DL — HIGH (ref 0.5–1.3)
DIFF PNL FLD: ABNORMAL
ELLIPTOCYTES BLD QL SMEAR: SLIGHT — SIGNIFICANT CHANGE UP
EOSINOPHIL NFR BLD AUTO: 1 % — SIGNIFICANT CHANGE UP (ref 0–5)
EPI CELLS # UR: SIGNIFICANT CHANGE UP
GLUCOSE SERPL-MCNC: 74 MG/DL — SIGNIFICANT CHANGE UP (ref 70–115)
GLUCOSE UR QL: NEGATIVE MG/DL — SIGNIFICANT CHANGE UP
GRAN CASTS # UR COMP ASSIST: ABNORMAL /LPF
HCT VFR BLD CALC: 27.3 % — LOW (ref 37–47)
HGB BLD-MCNC: 9.3 G/DL — LOW (ref 12–16)
HYALINE CASTS # UR AUTO: ABNORMAL /LPF
HYPOCHROMIA BLD QL: SLIGHT — SIGNIFICANT CHANGE UP
KETONES UR-MCNC: NEGATIVE — SIGNIFICANT CHANGE UP
LEUKOCYTE ESTERASE UR-ACNC: ABNORMAL
LYMPHOCYTES # BLD AUTO: 19 % — LOW (ref 20–55)
MAGNESIUM SERPL-MCNC: 2.2 MG/DL — SIGNIFICANT CHANGE UP (ref 1.6–2.6)
MCHC RBC-ENTMCNC: 31.2 PG — HIGH (ref 27–31)
MCHC RBC-ENTMCNC: 34.1 G/DL — SIGNIFICANT CHANGE UP (ref 32–36)
MCV RBC AUTO: 91.6 FL — SIGNIFICANT CHANGE UP (ref 81–99)
MONOCYTES NFR BLD AUTO: 6 % — SIGNIFICANT CHANGE UP (ref 3–10)
NEUTROPHILS NFR BLD AUTO: 73 % — SIGNIFICANT CHANGE UP (ref 37–73)
NITRITE UR-MCNC: NEGATIVE — SIGNIFICANT CHANGE UP
PH UR: 7 — SIGNIFICANT CHANGE UP (ref 5–8)
PLAT MORPH BLD: NORMAL — SIGNIFICANT CHANGE UP
PLATELET # BLD AUTO: 106 K/UL — LOW (ref 150–400)
POIKILOCYTOSIS BLD QL AUTO: SIGNIFICANT CHANGE UP
POTASSIUM SERPL-MCNC: 2.8 MMOL/L — CRITICAL LOW (ref 3.5–5.3)
POTASSIUM SERPL-SCNC: 2.8 MMOL/L — CRITICAL LOW (ref 3.5–5.3)
PROT SERPL-MCNC: 5.2 G/DL — LOW (ref 6.6–8.7)
PROT UR-MCNC: 100 MG/DL
RBC # BLD: 2.98 M/UL — LOW (ref 4.4–5.2)
RBC # FLD: 16.6 % — HIGH (ref 11–15.6)
RBC BLD AUTO: ABNORMAL
RBC CASTS # UR COMP ASSIST: SIGNIFICANT CHANGE UP /HPF (ref 0–4)
SCHISTOCYTES BLD QL AUTO: SLIGHT — SIGNIFICANT CHANGE UP
SODIUM SERPL-SCNC: 141 MMOL/L — SIGNIFICANT CHANGE UP (ref 135–145)
SP GR SPEC: 1.01 — SIGNIFICANT CHANGE UP (ref 1.01–1.02)
TARGETS BLD QL SMEAR: SLIGHT — SIGNIFICANT CHANGE UP
UROBILINOGEN FLD QL: NEGATIVE MG/DL — SIGNIFICANT CHANGE UP
WBC # BLD: 3.2 K/UL — LOW (ref 4.8–10.8)
WBC # FLD AUTO: 3.2 K/UL — LOW (ref 4.8–10.8)
WBC UR QL: SIGNIFICANT CHANGE UP

## 2018-05-09 PROCEDURE — 83735 ASSAY OF MAGNESIUM: CPT

## 2018-05-09 PROCEDURE — 87086 URINE CULTURE/COLONY COUNT: CPT

## 2018-05-09 PROCEDURE — 80053 COMPREHEN METABOLIC PANEL: CPT

## 2018-05-09 PROCEDURE — 96374 THER/PROPH/DIAG INJ IV PUSH: CPT | Mod: XU

## 2018-05-09 PROCEDURE — 85027 COMPLETE CBC AUTOMATED: CPT

## 2018-05-09 PROCEDURE — 99284 EMERGENCY DEPT VISIT MOD MDM: CPT

## 2018-05-09 PROCEDURE — 51702 INSERT TEMP BLADDER CATH: CPT

## 2018-05-09 PROCEDURE — 99284 EMERGENCY DEPT VISIT MOD MDM: CPT | Mod: 25

## 2018-05-09 PROCEDURE — 36415 COLL VENOUS BLD VENIPUNCTURE: CPT

## 2018-05-09 PROCEDURE — 81001 URINALYSIS AUTO W/SCOPE: CPT

## 2018-05-09 RX ORDER — SODIUM CHLORIDE 9 MG/ML
1000 INJECTION INTRAMUSCULAR; INTRAVENOUS; SUBCUTANEOUS ONCE
Qty: 0 | Refills: 0 | Status: COMPLETED | OUTPATIENT
Start: 2018-05-09 | End: 2018-05-09

## 2018-05-09 RX ORDER — POTASSIUM CHLORIDE 20 MEQ
10 PACKET (EA) ORAL ONCE
Qty: 0 | Refills: 0 | Status: COMPLETED | OUTPATIENT
Start: 2018-05-09 | End: 2018-05-09

## 2018-05-09 RX ORDER — NITROFURANTOIN MACROCRYSTAL 50 MG
1 CAPSULE ORAL
Qty: 6 | Refills: 0 | OUTPATIENT
Start: 2018-05-09 | End: 2018-05-11

## 2018-05-09 RX ADMIN — Medication 100 MILLIEQUIVALENT(S): at 19:57

## 2018-05-09 RX ADMIN — Medication 100 MILLIEQUIVALENT(S): at 20:59

## 2018-05-09 RX ADMIN — SODIUM CHLORIDE 1000 MILLILITER(S): 9 INJECTION INTRAMUSCULAR; INTRAVENOUS; SUBCUTANEOUS at 17:54

## 2018-05-09 NOTE — ED PROVIDER NOTE - OBJECTIVE STATEMENT
63 year old female with a hx of lung cancer, treated with chemotherapy, presenting with her son after not urinating since 7 am yesterday per family.. Per the son, pt has been receiving hydration and electrolyte replacement every other day at [facility], most recently on monday after vomiting. Pt is followed by Dr benoit cano at [facility]. Per family she has not been eating or hydrating herself very well. Pt states that she feels the urge to urinate but anytime that she sits on the toilet to urinate she is unable to do so. Pt denies CP, SOB, abdominal pain, vomiting or diarrhea. per family she is mentating at her norm. 63 year old female with a hx of lung cancer, treated with chemotherapy, presenting with her son after not urinating since 7 am yesterday per family.. Per the son, pt has been receiving hydration and electrolyte replacement every other day at [facility], most recently on monday after vomiting. Pt is followed by Dr benoit cano at [facility]. Per family she has not been eating or hydrating herself very well. Pt states that she feels the urge to urinate but anytime that she sits on the toilet to urinate she is unable to do so. Pt denies CP, SOB, abdominal pain, vomiting or diarrhea. per family she is mentating at her norm.

## 2018-05-09 NOTE — ED PROVIDER NOTE - CARDIAC, MLM
Normal rate, regular rhythm.  Heart sounds S1, S2.  No murmurs, rubs or gallops. 2+ pitting edema of bilateral lower extremities

## 2018-05-09 NOTE — ED PROVIDER NOTE - MEDICAL DECISION MAKING DETAILS
63 year old no urine since 7 am yesterday. cachetic appearing. receiving IV hydration x2 day/week. presenting with nausea, urinary retention   Labs, line, bolus, bladder scan, sharma 63 year old no urine since 7 am yesterday. cachetic appearing. receiving IV hydration x2 day/week. presenting with nausea, urinary retention   Labs, line, bolus, bladder scan, sharma  2 liters of fluid and DC sharma with FU with oncologist. pt educated that if she retains urine for more than 18 hours she needs to have a catheter placed to drain urine.

## 2018-05-09 NOTE — ED PROVIDER NOTE - CARE PLAN
Assessment and plan of treatment:	63 year old no urine since 7 am yesterday. cachetic appearing. receiving IV hydration x2 day/week. presenting with nausea, urinary retention   Labs, line, bolus, bladder scan, sharma Principal Discharge DX:	Urinary retention  Assessment and plan of treatment:	63 year old no urine since 7 am yesterday. cachetic appearing. receiving IV hydration x2 day/week. presenting with nausea, urinary retention   Labs, line, bolus, bladder scan, sharma  Secondary Diagnosis:	Hypokalemia

## 2018-05-09 NOTE — ED PROVIDER NOTE - ATTENDING CONTRIBUTION TO CARE
63 year old female with a hx of lung cancer, treated with chemotherapy, presenting with her son after not urinating since 7 am yesterday per family.. Per the son, pt has been receiving hydration and electrolyte replacement every other day at [facility], most recently on monday after vomiting. Pt is followed by Dr benoit cano at [facility]. Per family she has not been eating or hydrating herself very well. Pt states that she feels the urge to urinate.   pe ill looking;  heent ncat, mucosa dry; neck supple cor tachy, lungs cla abd mild distender lower abd; ext; edema b/l dx urinary retention, uti dehydration ivf, labs urine, and reeval

## 2018-05-09 NOTE — ED PROVIDER NOTE - PLAN OF CARE
63 year old no urine since 7 am yesterday. cachetic appearing. receiving IV hydration x2 day/week. presenting with nausea, urinary retention   Labs, line, bolus, bladder scan, sharma

## 2018-05-09 NOTE — ED PROVIDER NOTE - CONSTITUTIONAL, MLM
normal... frail and cachectic appearing elderly female in wheelchair. no apparent respiratory or physical distress . frail and cachectic appearing elderly female in wheelchair. no apparent respiratory or physical distress

## 2018-05-09 NOTE — ED PROVIDER NOTE - PROGRESS NOTE DETAILS
over 1000mls of blue urine with Medina placement 2.8 K will be replaced. second liter of fluids and K hanging. pt comfortable, minimal urine output in sharma

## 2018-05-09 NOTE — ED ADULT NURSE REASSESSMENT NOTE - NS ED NURSE REASSESS COMMENT FT1
addedeum  pt received with port accessed by jaime varghese and sharma placed by jaime varghese bhoth removed at d/c w/o incident

## 2018-05-09 NOTE — ED ADULT NURSE REASSESSMENT NOTE - NS ED NURSE REASSESS COMMENT FT1
late enrty.  received report received pt and family in rm a2 c line port dsg intact ns infused orders noted for potassium.  infusing well via pump w/o diff pt w/o c/o

## 2018-05-09 NOTE — ED ADULT NURSE NOTE - OBJECTIVE STATEMENT
pt alert and awake x3, states that she has not urinated for over 24 hours. pt c/o weakness also. last chemo treatment was in october

## 2018-05-10 PROBLEM — I82.419 ACUTE EMBOLISM AND THROMBOSIS OF UNSPECIFIED FEMORAL VEIN: Chronic | Status: ACTIVE | Noted: 2018-03-11

## 2018-05-10 LAB
CULTURE RESULTS: NO GROWTH — SIGNIFICANT CHANGE UP
SPECIMEN SOURCE: SIGNIFICANT CHANGE UP

## 2018-05-16 ENCOUNTER — INPATIENT (INPATIENT)
Facility: HOSPITAL | Age: 63
LOS: 0 days | DRG: 871 | End: 2018-05-16
Attending: HOSPITALIST | Admitting: HOSPITALIST
Payer: MEDICARE

## 2018-05-16 VITALS
RESPIRATION RATE: 20 BRPM | SYSTOLIC BLOOD PRESSURE: 64 MMHG | OXYGEN SATURATION: 92 % | HEART RATE: 76 BPM | DIASTOLIC BLOOD PRESSURE: 33 MMHG

## 2018-05-16 VITALS
HEART RATE: 94 BPM | WEIGHT: 110.01 LBS | DIASTOLIC BLOOD PRESSURE: 50 MMHG | HEIGHT: 62 IN | OXYGEN SATURATION: 88 % | SYSTOLIC BLOOD PRESSURE: 84 MMHG | RESPIRATION RATE: 12 BRPM

## 2018-05-16 DIAGNOSIS — C34.90 MALIGNANT NEOPLASM OF UNSPECIFIED PART OF UNSPECIFIED BRONCHUS OR LUNG: ICD-10-CM

## 2018-05-16 DIAGNOSIS — J96.01 ACUTE RESPIRATORY FAILURE WITH HYPOXIA: ICD-10-CM

## 2018-05-16 DIAGNOSIS — G93.40 ENCEPHALOPATHY, UNSPECIFIED: ICD-10-CM

## 2018-05-16 DIAGNOSIS — R06.02 SHORTNESS OF BREATH: ICD-10-CM

## 2018-05-16 DIAGNOSIS — Z51.5 ENCOUNTER FOR PALLIATIVE CARE: ICD-10-CM

## 2018-05-16 LAB
ANION GAP SERPL CALC-SCNC: 15 MMOL/L — SIGNIFICANT CHANGE UP (ref 5–17)
BUN SERPL-MCNC: 20 MG/DL — SIGNIFICANT CHANGE UP (ref 8–20)
CALCIUM SERPL-MCNC: 7.5 MG/DL — LOW (ref 8.6–10.2)
CHLORIDE SERPL-SCNC: 115 MMOL/L — HIGH (ref 98–107)
CO2 SERPL-SCNC: 14 MMOL/L — LOW (ref 22–29)
CREAT SERPL-MCNC: 2.26 MG/DL — HIGH (ref 0.5–1.3)
GLUCOSE SERPL-MCNC: 82 MG/DL — SIGNIFICANT CHANGE UP (ref 70–115)
HCT VFR BLD CALC: 22.9 % — LOW (ref 37–47)
HGB BLD-MCNC: 7.9 G/DL — LOW (ref 12–16)
MCHC RBC-ENTMCNC: 31.3 PG — HIGH (ref 27–31)
MCHC RBC-ENTMCNC: 34.5 G/DL — SIGNIFICANT CHANGE UP (ref 32–36)
MCV RBC AUTO: 90.9 FL — SIGNIFICANT CHANGE UP (ref 81–99)
PLATELET # BLD AUTO: 39 K/UL — LOW (ref 150–400)
POTASSIUM SERPL-MCNC: 2.9 MMOL/L — CRITICAL LOW (ref 3.5–5.3)
POTASSIUM SERPL-SCNC: 2.9 MMOL/L — CRITICAL LOW (ref 3.5–5.3)
RBC # BLD: 2.52 M/UL — LOW (ref 4.4–5.2)
RBC # FLD: 17.9 % — HIGH (ref 11–15.6)
SODIUM SERPL-SCNC: 144 MMOL/L — SIGNIFICANT CHANGE UP (ref 135–145)
WBC # BLD: 0.4 K/UL — CRITICAL LOW (ref 4.8–10.8)
WBC # FLD AUTO: 0.4 K/UL — CRITICAL LOW (ref 4.8–10.8)

## 2018-05-16 PROCEDURE — 80048 BASIC METABOLIC PNL TOTAL CA: CPT

## 2018-05-16 PROCEDURE — 99291 CRITICAL CARE FIRST HOUR: CPT | Mod: 25

## 2018-05-16 PROCEDURE — 93005 ELECTROCARDIOGRAM TRACING: CPT

## 2018-05-16 PROCEDURE — 36000 PLACE NEEDLE IN VEIN: CPT | Mod: LT,XU

## 2018-05-16 PROCEDURE — 93010 ELECTROCARDIOGRAM REPORT: CPT

## 2018-05-16 PROCEDURE — 71045 X-RAY EXAM CHEST 1 VIEW: CPT

## 2018-05-16 PROCEDURE — 36415 COLL VENOUS BLD VENIPUNCTURE: CPT

## 2018-05-16 PROCEDURE — 99223 1ST HOSP IP/OBS HIGH 75: CPT

## 2018-05-16 PROCEDURE — 85027 COMPLETE CBC AUTOMATED: CPT

## 2018-05-16 PROCEDURE — 96374 THER/PROPH/DIAG INJ IV PUSH: CPT | Mod: XU

## 2018-05-16 PROCEDURE — 36556 INSERT NON-TUNNEL CV CATH: CPT

## 2018-05-16 PROCEDURE — C1751: CPT

## 2018-05-16 PROCEDURE — 71045 X-RAY EXAM CHEST 1 VIEW: CPT | Mod: 26

## 2018-05-16 PROCEDURE — 99497 ADVNCD CARE PLAN 30 MIN: CPT | Mod: 25

## 2018-05-16 PROCEDURE — 99222 1ST HOSP IP/OBS MODERATE 55: CPT | Mod: AI

## 2018-05-16 PROCEDURE — 83605 ASSAY OF LACTIC ACID: CPT

## 2018-05-16 PROCEDURE — 94660 CPAP INITIATION&MGMT: CPT

## 2018-05-16 RX ORDER — SODIUM CHLORIDE 9 MG/ML
2000 INJECTION, SOLUTION INTRAVENOUS ONCE
Qty: 0 | Refills: 0 | Status: COMPLETED | OUTPATIENT
Start: 2018-05-16 | End: 2018-05-16

## 2018-05-16 RX ORDER — HYDROMORPHONE HYDROCHLORIDE 2 MG/ML
1 INJECTION INTRAMUSCULAR; INTRAVENOUS; SUBCUTANEOUS
Qty: 0 | Refills: 0 | Status: DISCONTINUED | OUTPATIENT
Start: 2018-05-16 | End: 2018-05-16

## 2018-05-16 RX ORDER — HYDROMORPHONE HYDROCHLORIDE 2 MG/ML
0.5 INJECTION INTRAMUSCULAR; INTRAVENOUS; SUBCUTANEOUS EVERY 4 HOURS
Qty: 0 | Refills: 0 | Status: DISCONTINUED | OUTPATIENT
Start: 2018-05-16 | End: 2018-05-16

## 2018-05-16 RX ORDER — ONDANSETRON 8 MG/1
4 TABLET, FILM COATED ORAL ONCE
Qty: 0 | Refills: 0 | Status: COMPLETED | OUTPATIENT
Start: 2018-05-16 | End: 2018-05-16

## 2018-05-16 RX ORDER — ROBINUL 0.2 MG/ML
0.4 INJECTION INTRAMUSCULAR; INTRAVENOUS EVERY 6 HOURS
Qty: 0 | Refills: 0 | Status: DISCONTINUED | OUTPATIENT
Start: 2018-05-16 | End: 2018-05-16

## 2018-05-16 RX ORDER — POTASSIUM CHLORIDE 20 MEQ
10 PACKET (EA) ORAL ONCE
Qty: 0 | Refills: 0 | Status: DISCONTINUED | OUTPATIENT
Start: 2018-05-16 | End: 2018-05-16

## 2018-05-16 RX ORDER — MORPHINE SULFATE 50 MG/1
2 CAPSULE, EXTENDED RELEASE ORAL ONCE
Qty: 0 | Refills: 0 | Status: DISCONTINUED | OUTPATIENT
Start: 2018-05-16 | End: 2018-05-16

## 2018-05-16 RX ORDER — SODIUM CHLORIDE 9 MG/ML
3 INJECTION INTRAMUSCULAR; INTRAVENOUS; SUBCUTANEOUS ONCE
Qty: 0 | Refills: 0 | Status: COMPLETED | OUTPATIENT
Start: 2018-05-16 | End: 2018-05-16

## 2018-05-16 RX ADMIN — SODIUM CHLORIDE 3 MILLILITER(S): 9 INJECTION INTRAMUSCULAR; INTRAVENOUS; SUBCUTANEOUS at 09:58

## 2018-05-16 RX ADMIN — SODIUM CHLORIDE 2000 MILLILITER(S): 9 INJECTION, SOLUTION INTRAVENOUS at 09:30

## 2018-05-16 RX ADMIN — MORPHINE SULFATE 2 MILLIGRAM(S): 50 CAPSULE, EXTENDED RELEASE ORAL at 13:30

## 2018-05-16 RX ADMIN — ONDANSETRON 4 MILLIGRAM(S): 8 TABLET, FILM COATED ORAL at 10:09

## 2018-05-16 RX ADMIN — MORPHINE SULFATE 2 MILLIGRAM(S): 50 CAPSULE, EXTENDED RELEASE ORAL at 13:00

## 2018-05-16 NOTE — ED PROVIDER NOTE - CARE PLAN
Principal Discharge DX:	Acute encephalopathy  Secondary Diagnosis:	Lung cancer  Secondary Diagnosis:	Brain tumor

## 2018-05-16 NOTE — CONSULT NOTE ADULT - PROBLEM SELECTOR RECOMMENDATION 9
most likely a result of terminal lung cancer most likely a result of terminal lung cancer  patient stopped treatment in november 2017  transition to comfort care. patient was given 2 mg morphine. if poor effect can also give dilaudid prn for comfort

## 2018-05-16 NOTE — GOALS OF CARE CONVERSATION - PERSONAL ADVANCE DIRECTIVE - CONVERSATION DETAILS
collaborated with pct director Dr Chaitanya Johnson .   family is distraught over pts condition, the team does not feel it is therapeutic at present to approach with hospice as family has made many difficult end of life decision today . Hospice will remain available and will continue to collaborate with palliative team

## 2018-05-16 NOTE — DISCHARGE NOTE FOR THE EXPIRED PATIENT - HOSPITAL COURSE
Me office notified not a candidate as per Devon Montiel. [place] notified possible eye donation.  has a ring on hand. Family brought home the rest of the belongings.  brought down to [facility] at 1628. Dr Ray Vidal will sign death certificate. Me office notified not a candidate as per Devon Montiel. [place] notified possible eye donation.  has a ring on hand. Family brought home the rest of the belongings.  brought down to [facility] at 1628. Dr Ray Vidal will sign death certificate.    64 y/o female with a pmhx of lung cancer with mets to the brain presents to the ED BIB family after developing AMS over the last 48 hours. Patients daughter states she was last her normal self about 2 weeks ago and since then she has progressively become nonverbal and has not been eating. This morning the family thought that she was less responsive than usual and brought her to ED. In ED she was found to be hypoglycemic, hypoxic, and hypotensive. She was placed on bipap and had SaO2 in the high 80s on 100% FiO2 and SBP in the 60s refractory to fluid resuscitation. The patient is mostly unresponsive but sometimes opens her eyes when being moved. As per her family, she elected to stop treatments for her cancer in 2017.  family agreed and decided to withhold all invasive measures including pressor support.   Family wants DNR/DNI/Comfort care only, hospice evaluated the pt       1- Sepsis/Lung carcinoma mets to brain  Pt unresponsive, Pt wants DNR/DNI/Comfort care,   Palliative evaluated the patient and Pt was hypotensive and   Family decided to withhold all invasive measures including pressor support.    Pt was pronounced dead 14:13   18

## 2018-05-16 NOTE — ED PROVIDER NOTE - OBJECTIVE STATEMENT
62 y/o F with hx of terminal cancer (stopped treatment in november) presents to ED for AMS. Last seen yesterday as normal. This morning pt was unresponsive and family called EMS. Pt was hypoglycemic and she was given glucose. When transferring to [facility], pt opened eyes and knows she is in hospital. but she is hypotensive and hypoxic room air in 80s on 100% nonrebreather. Family is in process of getting DNR, but haven't gotten it yet. No fever, CP, vomiting, diarrhea. Pt was here in ED last week for urinary retention, she has been urinating once a day. History given by AMS

## 2018-05-16 NOTE — ED PROVIDER NOTE - CONSTITUTIONAL, MLM
normal... Pt is emaciated,  awake, alert, oriented to person, place, time/situation and in no apparent distress.

## 2018-05-16 NOTE — CONSULT NOTE ADULT - SUBJECTIVE AND OBJECTIVE BOX
HPI: 66F with PMH as listed admitted with     PERTINENT PMH REVIEWED: Yes     PAST MEDICAL & SURGICAL HISTORY:  Acute deep vein thrombosis (DVT) of femoral vein, unspecified laterality  Brain tumor  Lung cancer: with mets to brain  Hypertension  S/P cardiac catheterization: 5 cardiac stents  No significant past surgical history    SOCIAL HISTORY:  no EtOH                                    Admitted from:  home     Surrogate - daughter Sandra     FAMILY HISTORY:  No pertinent family history in first degree relatives      Baseline ADLs (prior to admission):  Independent/ Dependent      Allergies    codeine (Unknown)  penicillin (Unknown)    Present Symptoms:     Dyspnea: 0 1 2 3   Nausea/Vomiting: Yes No  Anxiety:  Yes No  Depression: Yes No  Fatigue: Yes No  Loss of appetite: Yes No    Pain:             Character-            Duration-            Effect-            Factors-            Frequency-            Location-            Severity-    Review of Systems: Reviewed                     Negative:                     Positive:  Unable to obtain due to poor mentation   All others negative    MEDICATIONS  (STANDING):  HYDROmorphone  Injectable 0.5 milliGRAM(s) IV Push every 4 hours  morphine  - Injectable 2 milliGRAM(s) IV Push once    MEDICATIONS  (PRN):  glycopyrrolate Injectable 0.4 milliGRAM(s) IV Push every 6 hours PRN secretions  HYDROmorphone  Injectable 1 milliGRAM(s) IV Push every 2 hours PRN dyspnea or pain  LORazepam   Injectable 0.5 milliGRAM(s) IV Push every 2 hours PRN anxiety or agitation    PHYSICAL EXAM:    Vital Signs Last 24 Hrs  T(C): 35.9 (16 May 2018 09:52), Max: 35.9 (16 May 2018 09:52)  T(F): 96.6 (16 May 2018 09:52), Max: 96.6 (16 May 2018 09:52)  HR: 76 (16 May 2018 11:52) (76 - 94)  BP: 64/33 (16 May 2018 11:52) (64/33 - 84/50)  BP(mean): --  RR: 20 (16 May 2018 11:52) (12 - 21)  SpO2: 92% (16 May 2018 11:52) (88% - 95%)    General: alert  oriented x ____ lethargic agitated                  cachexia  nonverbal  coma    Karnofsky:  %    HEENT: normal  dry mouth  ET tube/trach    Lungs: comfortable tachypnea/labored breathing  excessive secretions    CV: normal  tachycardia    GI: normal  distended  tender  no BS               PEG/NG/OG tube  constipation  last BM:     : normal  incontinent  oliguria/anuria  sharma    MSK: normal  weakness  edema             ambulatory  bedbound/wheelchair bound    Skin: normal  pressure ulcers- Stage_____  no rash    LABS:                      7.9    0.4   )-----------( 39       ( 16 May 2018 10:01 )             22.9     05-16    144  |  115<H>  |  20.0  ----------------------------<  82  2.9<LL>   |  14.0<L>  |  2.26<H>    Ca    7.5<L>      16 May 2018 10:01          I&O's Summary      RADIOLOGY & ADDITIONAL STUDIES:    ADVANCE DIRECTIVES:   DNR YES NO  Completed on:                     SATHISH  YES NO   Completed on:  Living Will  YES NO   Completed on: HPI: 66F with PMH as listed admitted with unresponsiveness, she has been hypotensive     PERTINENT PMH REVIEWED: Yes     PAST MEDICAL & SURGICAL HISTORY:  Acute deep vein thrombosis (DVT) of femoral vein, unspecified laterality  Brain tumor  Lung cancer: with mets to brain  Hypertension  S/P cardiac catheterization: 5 cardiac stents  No significant past surgical history    SOCIAL HISTORY:  no EtOH                                    Admitted from:  home     Surrogate - daughter Sandra     FAMILY HISTORY:  No pertinent family history in first degree relatives      Baseline ADLs (prior to admission):  Independent/ Dependent      Allergies    codeine (Unknown)  penicillin (Unknown)    Present Symptoms:     Dyspnea: 0 1 2 3   Nausea/Vomiting: Yes No  Anxiety:  Yes No  Depression: Yes No  Fatigue: Yes No  Loss of appetite: Yes No    Pain:             Character-            Duration-            Effect-            Factors-            Frequency-            Location-            Severity-    Review of Systems: Reviewed                     Negative:                     Positive:  Unable to obtain due to poor mentation   All others negative    MEDICATIONS  (STANDING):  HYDROmorphone  Injectable 0.5 milliGRAM(s) IV Push every 4 hours  morphine  - Injectable 2 milliGRAM(s) IV Push once    MEDICATIONS  (PRN):  glycopyrrolate Injectable 0.4 milliGRAM(s) IV Push every 6 hours PRN secretions  HYDROmorphone  Injectable 1 milliGRAM(s) IV Push every 2 hours PRN dyspnea or pain  LORazepam   Injectable 0.5 milliGRAM(s) IV Push every 2 hours PRN anxiety or agitation    PHYSICAL EXAM:    Vital Signs Last 24 Hrs  T(C): 35.9 (16 May 2018 09:52), Max: 35.9 (16 May 2018 09:52)  T(F): 96.6 (16 May 2018 09:52), Max: 96.6 (16 May 2018 09:52)  HR: 76 (16 May 2018 11:52) (76 - 94)  BP: 64/33 (16 May 2018 11:52) (64/33 - 84/50)  BP(mean): --  RR: 20 (16 May 2018 11:52) (12 - 21)  SpO2: 92% (16 May 2018 11:52) (88% - 95%)    General: alert  oriented x ____ lethargic agitated                  cachexia  nonverbal  coma    Karnofsky:  %    HEENT: normal  dry mouth  ET tube/trach    Lungs: comfortable tachypnea/labored breathing  excessive secretions    CV: normal  tachycardia    GI: normal  distended  tender  no BS               PEG/NG/OG tube  constipation  last BM:     : normal  incontinent  oliguria/anuria  sharma    MSK: normal  weakness  edema             ambulatory  bedbound/wheelchair bound    Skin: normal  pressure ulcers- Stage_____  no rash    LABS:                      7.9    0.4   )-----------( 39       ( 16 May 2018 10:01 )             22.9     05-16    144  |  115<H>  |  20.0  ----------------------------<  82  2.9<LL>   |  14.0<L>  |  2.26<H>    Ca    7.5<L>      16 May 2018 10:01          I&O's Summary      RADIOLOGY & ADDITIONAL STUDIES:    ADVANCE DIRECTIVES:   DNR YES NO  Completed on:                     MOLST  YES NO   Completed on:  Living Will  YES NO   Completed on: HPI: 66F with PMH as listed admitted with unresponsiveness, she has been hypotensive, bradycardia, in active dying phases. Family had been indecisive about decisions, but Ander NINO and I met with them, and they have now decided on full comfort care and no aggressive measures.     PERTINENT PMH REVIEWED: Yes     PAST MEDICAL & SURGICAL HISTORY:  Acute deep vein thrombosis (DVT) of femoral vein, unspecified laterality  Brain tumor  Lung cancer: with mets to brain  Hypertension  S/P cardiac catheterization: 5 cardiac stents  No significant past surgical history    SOCIAL HISTORY:  no EtOH                                    Admitted from:  home     Surrogate - daughter Sandra     FAMILY HISTORY:  No pertinent family history in first degree relatives    Baseline ADLs (prior to admission):  Dependent      Allergies    codeine (Unknown)  penicillin (Unknown)    Present Symptoms:     Dyspnea: 3  Nausea/Vomiting: No  Anxiety:  unable   Depression: unable   Fatigue: Yes  Loss of appetite: unable     Pain: none             Character-            Duration-            Effect-            Factors-            Frequency-            Location-            Severity-    Review of Systems: Reviewed  Unable to obtain due to poor mentation   All others negative    MEDICATIONS  (STANDING):  HYDROmorphone  Injectable 0.5 milliGRAM(s) IV Push every 4 hours  morphine  - Injectable 2 milliGRAM(s) IV Push once    MEDICATIONS  (PRN):  glycopyrrolate Injectable 0.4 milliGRAM(s) IV Push every 6 hours PRN secretions  HYDROmorphone  Injectable 1 milliGRAM(s) IV Push every 2 hours PRN dyspnea or pain  LORazepam   Injectable 0.5 milliGRAM(s) IV Push every 2 hours PRN anxiety or agitation    PHYSICAL EXAM:    Vital Signs Last 24 Hrs  T(C): 35.9 (16 May 2018 09:52), Max: 35.9 (16 May 2018 09:52)  T(F): 96.6 (16 May 2018 09:52), Max: 96.6 (16 May 2018 09:52)  HR: 76 (16 May 2018 11:52) (76 - 94)  BP: 64/33 (16 May 2018 11:52) (64/33 - 84/50)  BP(mean): --  RR: 20 (16 May 2018 11:52) (12 - 21)  SpO2: 92% (16 May 2018 11:52) (88% - 95%)    General: unresponsive    Karnofsky:  20%    HEENT: dry mouth     Lungs: tachypnea/labored breathing     CV: normal      GI: normal    : normal    MSK: weakness     Skin: no rash    LABS:                      7.9    0.4   )-----------( 39       ( 16 May 2018 10:01 )             22.9     05-16    144  |  115<H>  |  20.0  ----------------------------<  82  2.9<LL>   |  14.0<L>  |  2.26<H>    Ca    7.5<L>      16 May 2018 10:01    I&O's Summary    RADIOLOGY & ADDITIONAL STUDIES:    < from: Xray Chest 1 View AP/PA. (05.16.18 @ 11:37) >    Layering left effusion.    < from: US Duplex Venous Lower Ext Complete, Bilateral (03.12.18 @ 10:40) >    No evidence of bilateral lower extremity deep venous thrombosis.    ADVANCE DIRECTIVES: DNR/I

## 2018-05-16 NOTE — ED ADULT NURSE NOTE - OBJECTIVE STATEMENT
CODE TEAM called to bedside. MD. Cordero at bedside. 63 year old female awake, lethargic, comes to ED brought by EMS. as per EMS family found patient unresponsive this morning. patient's bp was 80/50 / BS was 40. EMS gave amp of d5 and 250mL of fluid. patient's BS went up to 350. patient is still hypotensive. patient 85% on non rebreather. as per EMS family states patient is usually up laughing, talking, normally 100% on room air. patient able to understand questions and make needs known. CODE TEAM called to bedside. MD. Cordero at bedside. 63 year old female awake, lethargic, comes to ED brought by EMS. as per EMS family found patient unresponsive this morning. patient's bp was 80/50 / BS was 40. EMS gave amp of d5 and 250mL of fluid. patient's BS went up to 350. patient is still hypotensive. patient has hx of lung CA. patient stopped getting chemo in November. patient 85% on non rebreather. as per EMS family states patient is usually up laughing, talking, normally 100% on room air. patient able to understand questions and make needs known.

## 2018-05-16 NOTE — CONSULT NOTE ADULT - ATTENDING COMMENTS
COUNSELING:  Face to face meeting to discuss Advanced Care Planning - Time Spent 20 Minutes.      Thank you for the opportunity to assist with the care of this patient.   [facility] [phone].

## 2018-05-16 NOTE — CONSULT NOTE ADULT - PROBLEM SELECTOR RECOMMENDATION 2
- discontinued bipap as it will not change overall outcome. gave morphine, it didn't do much so I ordered dilaudid ATC and PRN.
as above

## 2018-05-16 NOTE — H&P ADULT - HISTORY OF PRESENT ILLNESS
64 y/o F with hx of  lung cancer with mets to brain  (stopped treatment in november) presents to ED for AMS. Last seen yesterday as normal. This morning pt was unresponsive and family called EMS. Pt was hypoglycemic and she was given glucose in ER . When transferring to [facility], pt opened eyes and knows she is in hospital. but she is hypotensive and hypoxic room air in 80s on 100% nonrebreather. Family is in process of getting DNR, but haven't gotten it yet. No fever, CP, vomiting, diarrhea. Pt was here in ED last week for urinary retention, she has been urinating once a day. History given by AMS 62 y/o female with a pmhx of lung cancer with mets to the brain presents to the ED BIB family after developing AMS over the last 48 hours. Patients daughter states she was last her normal self about 2 weeks ago and since then she has progressively become nonverbal and has not been eating. This morning the family thought that she was less responsive than usual and brought her to ED. In ED she was found to be hypoglycemic, hypoxic, and hypotensive. She was placed on bipap and had SaO2 in the high 80s on 100% FiO2 and SBP in the 60s refractory to fluid resuscitation. The patient is mostly unresponsive but sometimes opens her eyes when being moved. As per her family, she elected to stop treatments for her cancer in November 2017.  family agreed and decided to withhold all invasive measures including pressor support.   Family wants DNR/DNI/Comfort care only, hospice evaluated the pt

## 2018-05-16 NOTE — ED ADULT NURSE REASSESSMENT NOTE - NS ED NURSE REASSESS COMMENT FT1
Patient BP 64/33 at this time. Dr Cordero and Dr Melendez at bedside. Pt place and  Trendelenburg position. Patient BP 64/33 at this time. Dr Cordero and Dr Melendez at bedside. IV fluid in progress as per Dr Cordero ordered. Pt place and  Trendelenburg position.

## 2018-05-16 NOTE — ED ADULT NURSE REASSESSMENT NOTE - NS ED NURSE REASSESS COMMENT FT1
MD. Cordero at bedside, as per MD. patient does need to be a code sepsis due to the patient's history.

## 2018-05-16 NOTE — ED PROVIDER NOTE - CRITICAL CARE PROVIDED
35 MINUTE/interpretation of diagnostic studies/documentation/additional history taking/consultation with other physicians/consult w/ pt's family directly relating to pts condition/direct patient care (not related to procedure)

## 2018-05-16 NOTE — ED ADULT NURSE REASSESSMENT NOTE - NS ED NURSE REASSESS COMMENT FT1
decision made for plaaiative care, bipap dced per md, medicated with morphine for pain control, end of life care decision made for palliative care, bipap dced per md, medicated with morphine for pain control, end of life care

## 2018-05-16 NOTE — DISCHARGE NOTE FOR THE EXPIRED PATIENT - REASON FOR ADMISSION
patient admitted for acute encephalopathy, patient placed on hospice and passed away at 1413, with family at bedside. AMS,

## 2018-05-16 NOTE — H&P ADULT - NSHPPHYSICALEXAM_GEN_ALL_CORE
PHYSICAL EXAM:  Vital Signs Last 24 Hrs  T(C): 35.9 (16 May 2018 09:52), Max: 35.9 (16 May 2018 09:52)  T(F): 96.6 (16 May 2018 09:52), Max: 96.6 (16 May 2018 09:52)  HR: 76 (16 May 2018 11:52) (76 - 94)  BP: 64/33 (16 May 2018 11:52) (64/33 - 84/50)  BP(mean): --  RR: 20 (16 May 2018 11:52) (12 - 21)  SpO2: 92% (16 May 2018 11:52) (88% - 95%)    General: patient is unresponsive.      HEENT: Pupils equal, reactive to light.  Symmetric.    PULM: on bipap. Clear to auscultation bilaterally, no significant sputum production    NECK: Supple, no lymphadenopathy, trachea midline    CVS: Regular rate and rhythm, no murmurs, rubs, or gallops    ABD: Soft, nondistended, nontender, normoactive bowel sounds, no masses    EXT: No edema, nontender    SKIN: Warm and well perfused, no rashes noted.    NEURO: patient is nonresponsive. withdraws from pain. PERRL.

## 2018-05-16 NOTE — CONSULT NOTE ADULT - ASSESSMENT
62 y/o female with hx of lung cancer with mets to brain presents with AMS and is found to be severely hypotensive, hypoxic, and unresponsive. A discussion with the patients daughters about the patients goals of care was had in the ED. The patients daughters both understand the poor prognosis of their mothers condition and it was explained that the decline in her status over the last two weeks up and her current clinical condition is most likely indicative of the terminal stages of their mom's cancer. The family agreed and decided to withhold all invasive measures including pressor support. 64 y/o female with hx of lung cancer with mets to brain presents with AMS and is found to be severely hypotensive, hypoxic, and unresponsive. A discussion with the patients daughters about the patients goals of care was had in the ED. The patients daughters both understand the poor prognosis of their mothers condition and it was explained that the decline in her status over the last two weeks and her current clinical condition is most likely indicative of the terminal stages of their mom's cancer. The family agreed and decided to withhold all invasive measures including pressor support and transition to comfort care. Palliative care was present for the consult. 62 y/o female with hx of lung cancer with mets to brain presents with AMS and is found to be severely hypotensive, hypoxic, and unresponsive. A discussion with the patients daughters about the patients goals of care was had in the ED. The patients daughters both understand the poor prognosis of their mothers condition and it was explained that the decline in her status over the last two weeks and her current clinical condition is most likely indicative of the terminal stages of their mom's cancer. The family agreed and decided to withhold all invasive measures including pressor support and transition to comfort care. Palliative care was present for the consult. Family was at bedside. Patient does not require MICU admission and will remain under care of Dr. Vidal.

## 2018-05-16 NOTE — ED PROVIDER NOTE - NEUROLOGICAL, MLM
Alert and oriented, no focal deficits, no motor or sensory deficits. lethargic, responds to verbal stimuli

## 2018-05-16 NOTE — ED ADULT TRIAGE NOTE - CHIEF COMPLAINT QUOTE
pt BIBA from home with +AMS, as per ems, pt was also hypotensive and hypoglycemic, ems administered 1 amp of D5o and 250ml of NS pta, code team called to bedside pta.

## 2018-05-16 NOTE — ED PROVIDER NOTE - MEDICAL DECISION MAKING DETAILS
hypotensive pt with terminal lung CA. will hydrate to norm tension and start bipap. labs, chest xray, urine, reassess

## 2018-05-16 NOTE — ED PROCEDURE NOTE - CPROC ED INFUS LINE DETAIL1
The location was identified, and the area was draped and prepped.
The location was identified, and the area was draped and prepped./The guidewire was recovered./The catheter was placed using sterile technique./All lumen(s) aspirated and flushed without difficulty.

## 2018-05-16 NOTE — ED PROVIDER NOTE - PROGRESS NOTE DETAILS
pt reassessed and is more awake, bp up to 80/50. met with family requesting hospice and comfort care. continue IV hydration and treatment PT'S BP DROPPED INTO 60S . FAMILY CHANGE DMIND FROM COMFORT CARE TO ICU CARE. ICU CALLED AND CAME TO ER. DR CALDERÓN ALSO CAME TO ER. SHE DISCUSSED CASE WITH FAMILY AND THEY RESCINDED THEIR WISHES. LIKELY TERMINAL . ASKED TO COME SEE PT BY DAUGHTER BECAUSE HEART RATE DROPPING. PT PROGRESSED FROM BRADYCARDIA TO ASYSTOLE. TOD 1413. DR COUGHLIN CALLED

## 2018-05-16 NOTE — H&P ADULT - ASSESSMENT
64 y/o female with a pmhx of lung cancer with mets to the brain presents to the ED BIB family after developing AMS over the last 48 hours. Patients daughter states she was last her normal self about 2 weeks ago and since then she has progressively become nonverbal and has not been eating. This morning the family thought that she was less responsive than usual and brought her to ED. In ED she was found to be hypoglycemic, hypoxic, and hypotensive. She was placed on bipap and had SaO2 in the high 80s on 100% FiO2 and SBP in the 60s refractory to fluid resuscitation. The patient is mostly unresponsive but sometimes opens her eyes when being moved. As per her family, she elected to stop treatments for her cancer in November 2017.  family agreed and decided to withhold all invasive measures including pressor support.   Family wants DNR/DNI/Comfort care only, hospice evaluated the pt       1- Sepsis/Lung carcinoma mets to brain  Pt unresponsive, Pt wants DNR/DNI/Comfort care,   Palliative evaluated the patient and Pt was hypotensive and   Family decided to withhold all invasive measures including pressor support.    Pt was pronounced dead 14:13   05/16/18

## 2018-05-16 NOTE — CONSULT NOTE ADULT - PROBLEM SELECTOR PROBLEM 1
Acute respiratory failure with hypoxia
Malignant neoplasm of lung, unspecified laterality, unspecified part of lung

## 2018-05-16 NOTE — ED PROVIDER NOTE - CRITICAL CARE INDICATION, MLM
patient was critically ill... Patient was critically ill with a high probability of imminent or life threatening deterioration. AMS, HYPOTENSIVE, HYPOXIA. STAT IV ACCESS OF PORT, PERIPHERAL IV, IV FLUIDS, LABS CATHETER, XRAY BIPAP HOSPICE ADMIT

## 2018-05-16 NOTE — CONSULT NOTE ADULT - PROBLEM SELECTOR RECOMMENDATION 4
-ACP discussion --> met with 2 daughters with Ander NINO, we discussed overall grave prognosis and that unfortunately further aggressive procedures like vasopressors, bipap, etc are not going to be medically therapeutic but rather just prolong a dying process. They understand the poor prognosis and just want to make sure that she is not in pain and that she is comfortable. End of life care, they realize her prognosis at this point is hours to days. Patient too unstable to transfer our of hospital to an inpatient hospice unit. DNR/I and comfort care only.

## 2018-07-16 PROBLEM — C34.90 MALIGNANT NEOPLASM OF UNSPECIFIED PART OF UNSPECIFIED BRONCHUS OR LUNG: Chronic | Status: ACTIVE | Noted: 2017-11-10

## 2018-07-17 PROBLEM — D49.6 NEOPLASM OF UNSPECIFIED BEHAVIOR OF BRAIN: Chronic | Status: ACTIVE | Noted: 2018-03-10

## 2018-10-17 NOTE — CONSULT NOTE ADULT - SUBJECTIVE AND OBJECTIVE BOX
Patient is a 63y old  Female who presents with a chief complaint of AMS    64 y/o female with a pmhx of lung cancer with mets to the brain presents to the ED BIB family after developing AMS over the last 48 hours. Patients daughter states she was last her normal self about 2 weeks ago and since then she has progressively become nonverbal and has not been eating. This morning the family thought that she was less responsive than usual and brought her to ED. In ED she was found to be hypoglycemic, hypoxic, and hypotensive. She was placed on bipap and had SaO2 in the high 80s on 100% FiO2 and SBP in the 60s refractory to fluid resuscitation. The patient is mostly unresponsive but sometimes opens her eyes when being moved. As per her family, she elected to stop treatments for her cancer in November 2017.     PAST MEDICAL & SURGICAL HISTORY:  Acute deep vein thrombosis (DVT) of femoral vein, unspecified laterality  Brain tumor  Lung cancer: with mets to brain  Hypertension  S/P cardiac catheterization: 5 cardiac stents  No significant past surgical history    Allergies    codeine (Unknown)  penicillin (Unknown)    Intolerances      FAMILY HISTORY:  No pertinent family history in first degree relatives      Review of Systems:  CONSTITUTIONAL: No fever, chills, or fatigue  EYES: No eye pain, visual disturbances, or discharge  ENMT:  No difficulty hearing, tinnitus, vertigo; No sinus or throat pain  NECK: No pain or stiffness  RESPIRATORY: No cough, wheezing, chills or hemoptysis; No shortness of breath  CARDIOVASCULAR: No chest pain, palpitations, dizziness, or leg swelling  GASTROINTESTINAL: No abdominal or epigastric pain. No nausea, vomiting, or hematemesis; No diarrhea or constipation. No melena or hematochezia.  GENITOURINARY: No dysuria, frequency, hematuria, or incontinence  NEUROLOGICAL: No headaches, memory loss, loss of strength, numbness, or tremors  SKIN: No itching, burning, rashes, or lesions   MUSCULOSKELETAL: No joint pain or swelling; No muscle, back, or extremity pain  PSYCHIATRIC: No depression, anxiety, mood swings, or difficulty sleeping      Medications:        HYDROmorphone  Injectable 1 milliGRAM(s) IV Push every 2 hours PRN  HYDROmorphone  Injectable 0.5 milliGRAM(s) IV Push every 4 hours  LORazepam   Injectable 0.5 milliGRAM(s) IV Push every 2 hours PRN        glycopyrrolate Injectable 0.4 milliGRAM(s) IV Push every 6 hours PRN                      ICU Vital Signs Last 24 Hrs  T(C): 35.9 (16 May 2018 09:52), Max: 35.9 (16 May 2018 09:52)  T(F): 96.6 (16 May 2018 09:52), Max: 96.6 (16 May 2018 09:52)  HR: 76 (16 May 2018 11:52) (76 - 94)  BP: 64/33 (16 May 2018 11:52) (64/33 - 84/50)  BP(mean): --  ABP: --  ABP(mean): --  RR: 20 (16 May 2018 11:52) (12 - 21)  SpO2: 92% (16 May 2018 11:52) (88% - 95%)    Vital Signs Last 24 Hrs  T(C): 35.9 (16 May 2018 09:52), Max: 35.9 (16 May 2018 09:52)  T(F): 96.6 (16 May 2018 09:52), Max: 96.6 (16 May 2018 09:52)  HR: 76 (16 May 2018 11:52) (76 - 94)  BP: 64/33 (16 May 2018 11:52) (64/33 - 84/50)  BP(mean): --  RR: 20 (16 May 2018 11:52) (12 - 21)  SpO2: 92% (16 May 2018 11:52) (88% - 95%)        I&O's Detail        LABS:                        7.9    0.4   )-----------( 39       ( 16 May 2018 10:01 )             22.9     05-16    144  |  115<H>  |  20.0  ----------------------------<  82  2.9<LL>   |  14.0<L>  |  2.26<H>    Ca    7.5<L>      16 May 2018 10:01            CAPILLARY BLOOD GLUCOSE            CULTURES:  Culture Results:   No growth (05-09 @ 18:22)      Physical Examination:    General: patient is unresponsive.      HEENT: Pupils equal, reactive to light.  Symmetric.    PULM: on bipap. Clear to auscultation bilaterally, no significant sputum production    NECK: Supple, no lymphadenopathy, trachea midline    CVS: Regular rate and rhythm, no murmurs, rubs, or gallops    ABD: Soft, nondistended, nontender, normoactive bowel sounds, no masses    EXT: No edema, nontender    SKIN: Warm and well perfused, no rashes noted.    NEURO: patient is nonresponsive. withdraws from pain. PERRL.     DEVICES:     RADIOLOGY:    EXAM:  XR CHEST AP OR PA 1V                          PROCEDURE DATE:  05/16/2018          INTERPRETATION:  HISTORY:  Altered mental status, lung cancer, with   hypoxia  TECHNIQUE: Portable frontal view of the chest, 1 view.  COMPARISON: 3/11/2018.  FINDINGS:   There is a catheter in the right chest, with the tip in the distal SVC   region.  HEART:difficult to access in this projection  LUNGS: Hazy density at the left base likely represents a small effusion   in a semirecumbent patient. There is a cavitary lesion in the left upper   lobe.    OSSEOUS STRUCTURES:: degenerative changes    IMPRESSION:   Layering left effusion.      HUSAM GARZA M.D., ATTENDING RADIOLOGIST  This document has been electronically signed. May 16 2018 11:44AM Patient is a 63y old  Female who presents with a chief complaint of AMS    64 y/o female with a pmhx of lung cancer with mets to the brain presents to the ED BIB family after developing AMS over the last 48 hours. Patients daughter states she was last her normal self about 2 weeks ago and since then she has progressively become nonverbal and has not been eating. This morning the family thought that she was less responsive than usual and brought her to ED. In ED she was found to be hypoglycemic, hypoxic, and hypotensive. She was placed on bipap and had SaO2 in the high 80s on 100% FiO2 and SBP in the 60s refractory to fluid resuscitation. The patient is mostly unresponsive but sometimes opens her eyes when being moved. As per her family, she elected to stop treatments for her cancer in November 2017.     PAST MEDICAL & SURGICAL HISTORY:  Acute deep vein thrombosis (DVT) of femoral vein, unspecified laterality  Brain tumor  Lung cancer: with mets to brain  Hypertension  S/P cardiac catheterization: 5 cardiac stents  No significant past surgical history    Allergies    codeine (Unknown)  penicillin (Unknown)    Intolerances      FAMILY HISTORY:  No pertinent family history in first degree relatives      Review of Systems:  CONSTITUTIONAL: No fever, chills, or fatigue  EYES: No eye pain, visual disturbances, or discharge  ENMT:  No difficulty hearing, tinnitus, vertigo; No sinus or throat pain  NECK: No pain or stiffness  RESPIRATORY: No cough, wheezing, chills or hemoptysis; No shortness of breath  CARDIOVASCULAR: No chest pain, palpitations, dizziness, or leg swelling  GASTROINTESTINAL: No abdominal or epigastric pain. No nausea, vomiting, or hematemesis; No diarrhea or constipation. No melena or hematochezia.  GENITOURINARY: No dysuria, frequency, hematuria, or incontinence  NEUROLOGICAL: No headaches, memory loss, loss of strength, numbness, or tremors  SKIN: No itching, burning, rashes, or lesions   MUSCULOSKELETAL: No joint pain or swelling; No muscle, back, or extremity pain  PSYCHIATRIC: No depression, anxiety, mood swings, or difficulty sleeping      Medications:        HYDROmorphone  Injectable 1 milliGRAM(s) IV Push every 2 hours PRN  HYDROmorphone  Injectable 0.5 milliGRAM(s) IV Push every 4 hours  LORazepam   Injectable 0.5 milliGRAM(s) IV Push every 2 hours PRN        glycopyrrolate Injectable 0.4 milliGRAM(s) IV Push every 6 hours PRN                      ICU Vital Signs Last 24 Hrs  T(C): 35.9 (16 May 2018 09:52), Max: 35.9 (16 May 2018 09:52)  T(F): 96.6 (16 May 2018 09:52), Max: 96.6 (16 May 2018 09:52)  HR: 76 (16 May 2018 11:52) (76 - 94)  BP: 64/33 (16 May 2018 11:52) (64/33 - 84/50)  BP(mean): --  ABP: --  ABP(mean): --  RR: 20 (16 May 2018 11:52) (12 - 21)  SpO2: 92% (16 May 2018 11:52) (88% - 95%)    Vital Signs Last 24 Hrs  T(C): 35.9 (16 May 2018 09:52), Max: 35.9 (16 May 2018 09:52)  T(F): 96.6 (16 May 2018 09:52), Max: 96.6 (16 May 2018 09:52)  HR: 76 (16 May 2018 11:52) (76 - 94)  BP: 64/33 (16 May 2018 11:52) (64/33 - 84/50)  BP(mean): --  RR: 20 (16 May 2018 11:52) (12 - 21)  SpO2: 92% (16 May 2018 11:52) (88% - 95%)        I&O's Detail        LABS:                        7.9    0.4   )-----------( 39       ( 16 May 2018 10:01 )             22.9     05-16    144  |  115<H>  |  20.0  ----------------------------<  82  2.9<LL>   |  14.0<L>  |  2.26<H>    Ca    7.5<L>      16 May 2018 10:01            CAPILLARY BLOOD GLUCOSE            CULTURES:  Culture Results:   No growth (05-09 @ 18:22)      Physical Examination:    General: patient is unresponsive.      HEENT: Pupils equal, reactive to light.  Symmetric.    PULM: on bipap. Clear to auscultation bilaterally, no significant sputum production    NECK: Supple, no lymphadenopathy, trachea midline    CVS: Regular rate and rhythm, no murmurs, rubs, or gallops    ABD: Soft, nondistended, nontender, normoactive bowel sounds, no masses    EXT: No edema, nontender    SKIN: Warm and well perfused, no rashes noted.    NEURO: patient is nonresponsive. withdraws from pain. PERRL. limited exam secondary to patients clinical condition.    DEVICES:     RADIOLOGY:    EXAM:  XR CHEST AP OR PA 1V                          PROCEDURE DATE:  05/16/2018          INTERPRETATION:  HISTORY:  Altered mental status, lung cancer, with   hypoxia  TECHNIQUE: Portable frontal view of the chest, 1 view.  COMPARISON: 3/11/2018.  FINDINGS:   There is a catheter in the right chest, with the tip in the distal SVC   region.  HEART:difficult to access in this projection  LUNGS: Hazy density at the left base likely represents a small effusion   in a semirecumbent patient. There is a cavitary lesion in the left upper   lobe.    OSSEOUS STRUCTURES:: degenerative changes    IMPRESSION:   Layering left effusion.      HUSAM GARZA M.D., ATTENDING RADIOLOGIST  This document has been electronically signed. May 16 2018 11:44AM No risk alerts present

## 2019-04-30 NOTE — ED PROVIDER NOTE - EYES, MLM
CT abd/pelvis showed evidence of enteritis in the jejenum  - s/p broad spectrum abx started when patient septic  - pt not having diarrhea so stool studies not sent  - cipro/flagyl x 7 days (day 7/7) Clear bilaterally, pupils equal, round and reactive to light. Good vision.

## 2019-07-13 NOTE — DIETITIAN INITIAL EVALUATION ADULT. - POUNDS LOST/GAINED
Called [facility] and spoke with Isabel. Pt to be transported in stable condition to [facility].      Gretchen Vazquez  07/12/19 2004 [id number]

## 2019-09-27 NOTE — ED PROVIDER NOTE - SKIN [+], MLM
[facility] Medicine Daily Progress Note    Date of Service  9/27/2019    Chief Complaint  63 y.o. male admitted 9/26/2019 with weakness with nausea/vomiting.    Hospital Course    63-year-old male history of recurrent episodes of weakness with nausea, vomiting,  recently discharged home on 9/25 with findings of acute gastroenteritis with low phosphorus, potassium readmitted with findings of severe low phosphorus.  In addition patient's magnesium and potassium levels were low.  Patient admitted for electrolyte replacement and further work-up.    Interval Problem Update    Persistent hypokalemia.  Magnesium low on follow-up this morning.  Reports feeling better without abdominal pain, no nausea/vomiting.  Patient with hypertension - SBP 190s to 160s overnight.  Wife reports no Hx of hypertension.      Consultants/Specialty  None     Code Status  Full code    Disposition    Anticipate DC home when stable    Review of Systems  Review of Systems   Constitutional: Positive for malaise/fatigue (Resolved). Negative for chills, diaphoresis and fever.   HENT: Negative for congestion.    Eyes: Negative for discharge and redness.   Respiratory: Negative for cough, shortness of breath, wheezing and stridor.    Cardiovascular: Negative for chest pain, palpitations and leg swelling.   Gastrointestinal: Positive for nausea (Resolved). Negative for abdominal pain, diarrhea and vomiting.   Genitourinary: Negative for flank pain and hematuria.   Musculoskeletal: Negative for back pain, joint pain and neck pain.   Neurological: Negative for tremors, sensory change, speech change, focal weakness and weakness.   Psychiatric/Behavioral: Negative for hallucinations and substance abuse.        Physical Exam  Temp:  [36.8 °C (98.2 °F)-37.3 °C (99.2 °F)] 37 °C (98.6 °F)  Pulse:  [40-72] 55  Resp:  [13-20] 16  BP: (118-203)/() 144/81  SpO2:  [91 %-99 %] 95 %    Physical Exam   Constitutional: He is oriented to person, place, and time. No  distress.   HENT:   Head: Normocephalic and atraumatic.   Nose: Nose normal.   Eyes: Conjunctivae and EOM are normal. No scleral icterus.   Neck: Normal range of motion. No JVD present.   Cardiovascular: Normal rate and regular rhythm.   No murmur heard.  Pulmonary/Chest: Effort normal. No stridor. No respiratory distress. He has no wheezes. He has no rales.   Abdominal: Soft. Bowel sounds are normal. He exhibits no distension. There is no tenderness.   Musculoskeletal: He exhibits no edema or tenderness.   Neurological: He is alert and oriented to person, place, and time. No cranial nerve deficit.   No focal weakness   Skin: Skin is warm and dry. He is not diaphoretic. No pallor.   Psychiatric: He has a normal mood and affect. His behavior is normal.   Vitals reviewed.      Fluids  No intake or output data in the 24 hours ending 09/27/19 1518    Laboratory  Recent Labs     09/25/19  0500 09/26/19  1253   WBC 12.7* 11.1*   RBC 4.52* 4.89   HEMOGLOBIN 14.4 16.3   HEMATOCRIT 43.5 46.3   MCV 96.2 94.7   MCH 31.9 33.3*   MCHC 33.1* 35.2   RDW 46.7 46.1   PLATELETCT 153* 171   MPV 9.2 9.4     Recent Labs     09/26/19  1253 09/27/19  0226 09/27/19  0912   SODIUM 141 139 138   POTASSIUM 3.1* 3.1* 3.0*   CHLORIDE 104 101 103   CO2 22 24 25   GLUCOSE 125* 127* 99   BUN 10 7* 6*   CREATININE 1.09 0.90 0.88   CALCIUM 9.7 9.4 8.9                   Imaging  CT-CHEST (THORAX) WITH   Final Result      Solitary 4 mm uncalcified left lower lobe nodule. See below recommendations.      Low Risk: No routine follow-up      High Risk: Optional CT at 12 months      Comments: Nodules less than 6 mm do not require routine follow-up, but certain patients at high risk with suspicious nodule morphology, upper lobe location, or both may warrant 12-month follow-up.      Low Risk - Minimal or absent history of smoking and of other known risk factors.      High Risk - History of smoking or of other known risk factors.      Note: These  recommendations do not apply to lung cancer screening, patients with immunosuppression, or patients with known primary cancer.      Fleischner Society 2017 Guidelines for Management of Incidentally Detected Pulmonary Nodules in Adults      US-RENAL   Final Result      2 cm right renal cyst. Otherwise unremarkable renal ultrasound.           Assessment/Plan  * Hypophosphatemia  Assessment & Plan  Patient with low vitamin D level which may cause low phosphorus.  PTH normal -- No hyperparathyroidism that could also cause low phosphorus.  Other causes which may be multifactorial in his findings including recent diarrhea, nausea vomiting, poor intake,, vitamin D deficiency, over exertion heavy exercising with hyperventilation  (patient reports running 3 miles prior to presenting with symptoms.) .   Fu 24 hr urine phosphorus , K excretion   Correct low Magnesium with IV magnesium.  His phosphorus levels have corrected - will continue to monitor.   May need fu with nephrology depending on findings.     Nausea & vomiting  Assessment & Plan  Of unclear etiology.  Recent diagnosis of gastroenteritis.  Abdominal exam benign.  LFTs and lipase normal-unlikely pancreas biliary source.  Patient was very hypertensive initially- ? Associated symptomatic hypertension , electrolyte depletion symptoms.  Symptoms now resolved.-Hep-Lock IV fluids.  PRN antiemetics.  Replete electrolytes.  Start treatment for hypertension.  Further work-up if recurrent symptoms.    Hypokalemia  Assessment & Plan  Likely assoc with recent N/V , nutritional .    Correct low Mag with IV magnesium   Start Oral Kcl - fu electrolytes.       Elevated alkaline phosphatase level  Assessment & Plan  Due to the elevation of alkaline phosphatase and low low phosphorus and potassium levels -- will order GGT to eval for biliary source     Incidental lung nodule- (present on admission)  Assessment & Plan  Outpatient follow up.     Hypertension  Assessment & Plan  No  reported history of.  Initial SBP 190s systolic.    We will start Norvasc, p.o. Labetalol  Add metanephrines and 24-hour urine cortisol to recent studies to evaluate for secondary causes of hypertension.  Advised patient outpatient follow-up with newly established PCP.     Leukocytosis  Assessment & Plan  Leukocytosis is mild at this point may be stress response versus underlying infection.  Patient without fevers .  Patient recent diagnosis of gastroenteritis--symptoms resolving--  will monitor for recurrent/acute symptoms .    Patient UA negative.  Blood cultures negative to date.-Follow-up        Hyperglycemia  Assessment & Plan  Fu HgbA1c 5.7 may suggest mild DM  Will need to dw patient treatment options  Fu Am bs.      Discussed with wife and  plan of care.     VTE prophylaxis: Lovenox.        ABRASION/scalp abrasion

## 2019-09-27 NOTE — PROGRESS NOTE ADULT - PROBLEM SELECTOR PROBLEM 5
Patient:   HENRI PINZON            MRN: [id number]            FIN: [id number]              Age:   31 years     Sex:  FEMALE     :  86   Associated Diagnoses:   None   Author:   EFRAIN SIFUENTES     Basic Information   Date of service 19  Patient with episode of SVT overnight that she self-converted out of before getting medications.  She has some back pain from laying in the same position overnight and is feeling somewhat anxious.  Denies chest pain or shrotness of breath.     Health Status   Allergies:    Allergic Reactions (All)  Severity Not Documented  Heparin- No reactions were documented.  Tape- No reactions were documented.   Current medications:    Medications (19) Active  Scheduled: (6)  *Do NOT give IM Injections  1 each, N/A, Daily  Docusate sodium 100 mg cap  100 mg 1 cap, Oral, Q Bedtime  Famotidine 20 mg tab  20 mg 1 tab, Oral, Q Bedtime  Insulin human lispro 10 unit/0.1 mL inj  1-6 units, Subcutaneous, QID [before meals & HS]  Levothyroxine 75 mcg tab  75 mcg 1 tab, Oral, QAM at 6  Sertraline 100 mg tab  100 mg 1 tab, Oral, Daily  Continuous: (5)  AMIODArone 450 mg [0.5 mg/min] + Dextrose 5% 250 mL  250 mL, IV, 16.67 mL/hr  argatroban 125 mg [0.5 mcg/kg/min] + Sodium Chloride 0.9% 125 mL  125 mL, IV, 6 mL/hr  DOBUTamine 500 mg [3 mcg/kg/min] + premixed in Dextrose 5% 250 mL  250 mL, IV, 18 mL/hr  milrinone 20 mg [0.25 mcg/kg/min] + premixed in Dextrose 5% 100 mL  100 mL, IV, 15 mL/hr  NORepinephrine 8 mg [2 mcg/min] + premixed in Sodium Chloride 0.9% 250 mL  250 mL, IV, 3.75 mL/hr  PRN: (8)  Acetaminophen 325 mg tab  650 mg 2 tab, Oral, Q4H  albumin human 25%  25 gm 100 mL, IVPB, Q1H  Dextrose (glucose) 40% 15 gm/37.5 gm oral gel UD  15 gm, Oral, As Directed PRN  Dextrose (glucose) 50% 25 gm/50 mL syringe  12.5 gm 25 mL, IV Push, As Directed PRN  dialysate, hemodialysis BGK4/2.5  30,000 mL, Dialysis, As Directed PRN  Glucagon 1 mg/1 mL emergency kit SDV  1 mg 1 mL, IM, As  Directed PRN  HydrOXYzine HCl 25 mg tab  25 mg 1 tab, Oral, Q6H  Sodium chloride PF 0.9% flush inj 10 mL  2 mL, Flush, As Directed PRN      Physical Examination   VS/Measurements     Vitals between:   11-JUL-2019 06:37:23   TO   12-JUL-2019 06:37:23                   LAST RESULT MINIMUM MAXIMUM  Temperature 36 35.3 36  Heart Rate 99 91 132  Respiratory Rate 22 12 28  A Line Systolic 106 85 138  A Line Diastolic 52 42 100  A Line Mean 66 58 113  CVP                     33 24 33  SpO2                    99 94 100  FiO2                    0.21 0.21 0.21  SvO2                    46 46 61      Review / Management   Results review:     Labs between:  11-JUL-2019 06:37 to 12-JUL-2019 06:37  CBC:                 WBC  HgB  Hct  Plt  MCV  RDW   12-JUL-2019 7.9  (L) 9.8  (L) 31.3  (L) 72  82.4  (H) 23.9   POC GLU:                 Latest Result  Latest Date  Minimum  Min Date  Maximum  Max Date                             (H) 110  11-JUL-2019 (H) 110  11-JUL-2019 99 11-JUL-2019  COAG:                 INR  PT  PTT  Ddimer  Fibrinogen    12-JUL-2019     (H) 64       11-JUL-2019     (H) 61       11-JUL-2019     (H) 66       11-JUL-2019     (H) 77       11-JUL-2019     (H) 79       11-JUL-2019     (H) 74       11-JUL-2019     (H) 87       11-JUL-2019     (H) 77       Blood Gas:            Ph  PCO2  PO2  BiCarb  BaseExcess   Arterial:  12-JUL-2019 7.45  (L) 31  (L) 67  22  NOT APPLICABLE                              Ionized Ca  Na  K  HgB  Lactic Acid                              1.16  (L) 131  4.1  (L) 10.1  (H) 1.7                  .      Impression and Plan   Impression: Patient is a 30 yo woman admitted to the MICU with cardiogenic shock, acute on chronic HFrEF with volume overload  Problems:   Cardiogenic shock  Acute on chronic HFrEF  Pulmonary hypertension  Volume overload  SVT  NICM  Syncope  Afib with RVR  Bilateral pleural effusions  Thrombocytopenia with HIT  Morbid obesity  Lactatemia  Anemia  Decrease Dobutamine per HF  due to the episode of SVT last night  Continue milrinone at current dose for now  Ok with checking random cortisol level  Ok with Palliative consult  Recheck venous saturation q 8 hours  Positive PF4 on Argatroban with thrombocytopenia with current platelets 72K.  Plan to hold if platelets < 50k.  Complete 24 hours of the amiodarone drip, then change to PO.  Would favor 400 mg q 12 hours PO to start, but will touch base with EP who is following.  Continue q 8 hour hemodynamics.  Continue CRRT for volume removal and continue norepinephrine for BP supprt to facilitate volume removal.  Goal MAP >65 on norepinephrine currently  Continue synthroid  Continue home zoloft  Dispo: MICU  Code Status: Full Code     Professional Services   I spent 40 minutes in the critical care management of this patient excluding procedures and teaching.   Hypokalemia due to inadequate potassium intake

## 2019-10-07 NOTE — DISCHARGE NOTE ADULT - LAUNCH MEDICATION RECONCILIATION
Josselyn Gray is a 48 Screening:     3 most recent PHQ Screens 10/7/Cardiologist Dr. Mayer Him; [place]; Podiatrist; Nephrologist; Dr. Damain Howell Endocrinologist    Advanced Directive:   1. Do you have an Advanced Directive? NO    2. Would you like information on Advanced Directives?  NO <<-----Click here for Discharge Medication Review

## 2020-10-14 NOTE — ED ADULT NURSE NOTE - NS ED NURSE RECORD ANOTHER HT AND WT
[facility]    PCP: Mary Alice Griffith MD    Chief Complaint   Patient presents with   • Follow-up        HPI  Feliciano Smallwood is a 77 year old male here today.  Pleasant gentleman with known aortic valve replacement in 2017 at that time had fairly significant aortic insufficiency and reduced ejection fraction.  Tolerated the surgery fairly well.  EF has recovered about 60% EF.    Last echo valve function was normal.    Lipids look good.  LDL 84 HDL 48 and triglycerides 66.    Heart rate and blood pressure well controlled no chest pain no shortness of breath no peripheral edema.    Exercise a [employer] 6 days a week gets 30 minutes of aerobic on exercise bike and treadmill.  S the maximal amount of exertion he can typically do.    His rate limiting step is not his heart and his valve is been his chronic back pain and abdominal pain post surgeries on his spine.    Past Medical History  Past Medical History:   Diagnosis Date   • Aortic insufficiency     3/14   • Essential hypertension    • History of aortic valve replacement     2017       Past Surgical History  Past Surgical History:   Procedure Laterality Date   • Transesophageal echo ([place]) complete w/cardioversion hospital performed         Family History  Family History   Problem Relation Age of Onset   • Coronary Artery Disease Neg Hx         Negative for premature CAD   • Aneurysm Neg Hx          Negative for AAA.       Social History  Social History     Substance and Sexual Activity   Alcohol Use None    Comment: drinks rarely     Social History     Tobacco Use   Smoking Status Never Smoker   Smokeless Tobacco Never Used   Tobacco Comment    Never used tobacco. denies smoking.     Social History     Substance and Sexual Activity   Drug Use Not on file       Allergies  ALLERGIES:   Allergen Reactions   • Penicillins Other (See Comments)     CLASS           Presenting Medications  Current Medications    ASPIRIN 325 MG  TABLET    Take 325 mg by mouth daily.    BACLOFEN (LIORESAL) 20 MG TABLET    1 po 4 times daily    CARVEDILOL (COREG) 6.25 MG TABLET    Take 1 tablet by mouth 2 times daily (with meals).    DIAZEPAM (VALIUM) 5 MG TABLET        DIGOXIN (LANOXIN) 0.125 MG TABLET    Take 1 tablet by mouth daily.    HYDROMORPHONE (DILAUDID) 8 MG TABLET    Take 8 mg by mouth.    LOSARTAN (COZAAR) 25 MG TABLET    Take 1 tablet by mouth daily.    MULTIPLE VITAMIN (THERA) TAB    Take 1 tablet by mouth.    SPIRONOLACTONE (ALDACTONE) 25 MG TABLET    TAKE 1 TABLET BY MOUTH DAILY AS DIRECTED    TAMSULOSIN (FLOMAX) 0.4 MG CAP    Take 0.4 mg by mouth.    ZOLPIDEM (AMBIEN) 10 MG TABLET    one tablet daily       Review of Systems  Review of Systems   Constitution: Negative for chills, fever, weight gain and weight loss.   HENT: Negative for hearing loss.    Eyes: Negative.         Patient denies significant visual changes   Cardiovascular: Negative for chest pain and claudication.        Negative except for what's indicated in HPI   Respiratory: Negative for cough and hemoptysis.    Endocrine: Negative.    Hematologic/Lymphatic: Does not bruise/bleed easily.   Skin: Negative for rash and suspicious lesions.   Musculoskeletal: Negative for arthritis.   Gastrointestinal: Negative for hematochezia and melena.   Genitourinary: Negative for hematuria.   Neurological:        No localized deficits   Allergic/Immunologic: Negative for environmental allergies.        No new food allergies       Physical Exam  Visit Vitals  /80   Pulse 62   Ht 5' 8\" (1.727 m)   Wt 66.7 kg (147 lb)   BMI 22.35 kg/m²     Body mass index is 22.35 kg/m².  Vital signs were reviewed today.    Physical Exam   Constitutional: He appears healthy. No distress.   Vital signs reviewed   HENT:   Mouth/Throat: Oropharynx is clear.   Eyes: Conjunctivae are normal.   Neck: No JVD present.   Cardiovascular: Normal rate, regular rhythm, S1 normal, S2 normal, intact distal pulses and  normal pulses.   Murmur heard.   Harsh midsystolic murmur is present with a grade of 2/6 at the upper right sternal border radiating to the neck.  Pulmonary/Chest: Effort normal and breath sounds normal. He has no wheezes. He has no rales. He exhibits no tenderness.   Abdominal: Soft. Bowel sounds are normal.   Musculoskeletal:         General: Deformity present. No edema.   Neurological: He is alert and oriented to person, place, and time. He has normal motor skills. Gait normal.   Skin: Skin is warm and dry. No rash noted. No cyanosis. Nails show no clubbing.       Recent Labs  Recent Labs   Lab 10/06/20   CHOLESTEROL 145   HDL 48   TRIGLYCERIDE 66   CALCULATED LDL 84       Recent Labs   Lab 10/06/20 11/18/19   Sodium 141  --    Chloride 104  --    BUN 15  --    Potassium 4.5  --    Glucose 109  --    Creatinine 0.8  --    CALCIUM 8.7  --    TSH  --  19.100       No results found for: HGBA1C.    Recent Labs   Lab 10/06/20   WBC 5.8   RBC 4.1   HGB 13.2   HCT 40.3            Diagnostic Testing: Patients records were reviewed since last visit for all cardiology testing.  LDL 84, HDL 48, triglycerides 66    Assessment  1. Thoracic ascending aortic aneurysm (CMS/HCC)    2. Aortic valve stenosis, etiology of cardiac valve disease unspecified    3. Paroxysmal atrial fibrillation (CMS/HCC)    4. Bicuspid aortic valve    5. Hypertension, benign    6. History of aortic valve replacement         Feliciano Smallwood is a 77 year old male status post aortic valve replacement thoracic aneurysm repair.  He has paroxysmal atrial fibrillation maintaining sinus rhythm.    He is doing quite well cardiovascular standpoint is exercise at [employer] has been 30 minutes consistently 6 days a week.        Recommendations    Return encourage clinic in 9 months.  Check CBC, CMP and a fasting lipid profile.    Check echo Doppler for his aortic valve replacement and measurement of his a sending aorta.  Previously had an bicuspid aortic  valve.    Refill cardiac meds 90-day supply 3 refills when needed.    Vincent Hadley MD   Yes

## 2020-12-08 NOTE — DISCHARGE NOTE ADULT - VISION (WITH CORRECTIVE LENSES IF THE PATIENT USUALLY WEARS THEM):
Normal vision: sees adequately in most situations; can see medication labels, newsprint
Attending Only

## 2021-01-29 NOTE — ED PROVIDER NOTE - SKIN NEGATIVE STATEMENT, MLM
Relevant Problems   PULMONARY   (+) Asthma       Physical Exam    Airway   Mallampati: II  TM distance: >3 FB  Neck ROM: full       Cardiovascular - normal exam  Rhythm: regular  Rate: normal  (-) murmur     Dental - normal exam           Pulmonary - normal exam  Breath sounds clear to auscultation     Abdominal    Neurological - normal exam                 Anesthesia Plan    ASA 2       Plan - general       Airway plan will be ETT      Plan Factors:   Patient was previously instructed to abstain from smoking on day of procedure.  Patient did not smoke on day of procedure.      Induction: intravenous    Postoperative Plan: Postoperative administration of opioids is intended.    Pertinent diagnostic labs and testing reviewed    Informed Consent:    Anesthetic plan and risks discussed with patient.    Use of blood products discussed with: patient whom consented to blood products.         
no abrasions, no jaundice, no lesions, no pruritis, and no rashes.

## 2021-03-01 NOTE — ED ADULT TRIAGE NOTE - CCCP TRG CHIEF CMPLNT
weakness [Good] : ~his/her~  mood as  good [Yes] : Yes [Monthly or less (1 pt)] : Monthly or less (1 point) [1 or 2 (0 pts)] : 1 or 2 (0 points) [Never (0 pts)] : Never (0 points) [No] : In the past 12 months have you used drugs other than those required for medical reasons? No [No falls in past year] : Patient reported no falls in the past year [0] : 2) Feeling down, depressed, or hopeless: Not at all (0) [Patient reported mammogram was normal] : Patient reported mammogram was normal [Patient reported PAP Smear was normal] : Patient reported PAP Smear was normal [Patient reported bone density results were normal] : Patient reported bone density results were normal [Patient reported colonoscopy was normal] : Patient reported colonoscopy was normal [HIV test declined] : HIV test declined [None] : None [With Significant Other] : lives with significant other [Retired] : retired [High School] : high school [] :  [# Of Children ___] : has [unfilled] children [Sexually Active] : sexually active [Feels Safe at Home] : Feels safe at home [Fully functional (bathing, dressing, toileting, transferring, walking, feeding)] : Fully functional (bathing, dressing, toileting, transferring, walking, feeding) [Fully functional (using the telephone, shopping, preparing meals, housekeeping, doing laundry, using] : Fully functional and needs no help or supervision to perform IADLs (using the telephone, shopping, preparing meals, housekeeping, doing laundry, using transportation, managing medications and managing finances) [Reports changes in hearing] : Reports changes in hearing [Reports normal functional visual acuity (ie: able to read med bottle)] : Reports normal functional visual acuity [Smoke Detector] : smoke detector [Carbon Monoxide Detector] : carbon monoxide detector [Seat Belt] :  uses seat belt [Sunscreen] : uses sunscreen [With Patient/Caregiver] : With Patient/Caregiver [Designated Healthcare Proxy] : Designated healthcare proxy [] : No [Audit-CScore] : 1 [HDX2Arjsy] : 0 [Change in mental status noted] : No change in mental status noted [High Risk Behavior] : no high risk behavior [Reports changes in vision] : Reports no changes in vision [Reports changes in dental health] : Reports no changes in dental health [MammogramDate] : 09/26/2020 [PapSmearDate] : 2020 [BoneDensityDate] : 93/12/2019 [ColonoscopyDate] : 11/27/2018 [HepatitisCDate] : 02/01/2016 [HepatitisCComments] : negative [AdvancecareDate] : 3/1/2021

## 2021-04-14 NOTE — DISCHARGE NOTE ADULT - PATIENT PORTAL LINK FT
Pink “You can access the FollowHealth Patient Portal, offered by [facility], by registering with the following website: [email or web address]”

## 2021-12-07 NOTE — ED ADULT TRIAGE NOTE - WEIGHT METHOD
-- DO NOT REPLY / DO NOT REPLY ALL --  -- Message is from the [facility]--    General Patient Message      Reason for Call: patient wife would like to speak with providers nurse in regards to referral for eye specialist that was never sent over and patient also need referral for today but was never received    Caller Information       Type Contact Phone    12/07/2021 03:12 PM CST Phone (Incoming) KATIUSKA ROUSSEAU (Emergency Contact) [phone]          Alternative phone number:[phone]    Turnaround time given to caller:   \"This message will be sent to [state Provider's name]. The clinical team will fulfill your request as soon as they review your message.\"     stated

## 2022-01-01 NOTE — INPATIENT CERTIFICATION FOR MEDICARE PATIENTS - THE STATUS OF COMORBIDITIES.
Problem: PAIN -   Goal: Displays adequate comfort level or baseline comfort level  Description: INTERVENTIONS:  - Perform pain scoring using age-appropriate tool with hands-on care as needed  Notify physician/AP of high pain scores not responsive to comfort measures  - Administer analgesics based on type and severity of pain and evaluate response  - Sucrose analgesia per protocol for brief minor painful procedures  - Teach parents interventions for comforting infant  Outcome: Progressing     Problem: SAFETY -   Goal: Patient will remain free from falls  Description: INTERVENTIONS:  - Instruct family/caregiver on patient safety  - Keep crib rails up when unattended  - Based on caregiver fall risk screen, instruct family/caregiver to ask for assistance with transferring infant if caregiver noted to have fall risk factors  Outcome: Progressing     Problem: Knowledge Deficit  Goal: Provide formula feeding instructions and preparation information to caregivers who do not wish to breastfeed their   Description: Provide one on one information on frequency, amount, and burping for formula feeding caregivers throughout their stay and at discharge  Provide written information/video on formula preparation  Outcome: Progressing  Goal: Infant caregiver verbalizes understanding of support and resources for follow up after discharge  Description: Provide individual discharge education on when to call the doctor  Provide resources and contact information for post-discharge support      Outcome: Progressing     Problem: DISCHARGE PLANNING  Goal: Discharge to home or other facility with appropriate resources  Description: INTERVENTIONS:  - Identify barriers to discharge w/patient and caregiver  - Arrange for needed discharge resources and transportation as appropriate  - Identify discharge learning needs (meds, wound care, etc )  - Arrange for interpretive services to assist at discharge as needed  - Refer to Case Management Department for coordinating discharge planning if the patient needs post-hospital services based on physician/advanced practitioner order or complex needs related to functional status, cognitive ability, or social support system  Outcome: Progressing     Problem: Adequate NUTRIENT INTAKE -   Goal: Nutrient/Hydration intake appropriate for improving, restoring or maintaining nutritional needs  Description: INTERVENTIONS:  - Assess growth and nutritional status of patients and recommend course of action  - Monitor nutrient intake, labs, and treatment plans  - Recommend appropriate diets and vitamin/mineral supplements  - Monitor and recommend adjustments to tube feedings and TPN/PPN based on assessed needs  - Provide specific nutrition education as appropriate  Outcome: Progressing  Goal: Breast feeding baby will demonstrate adequate intake  Description: Interventions:  - Monitor/record daily weights and I&O  - Monitor milk transfer  - Increase maternal fluid intake  - Increase breastfeeding frequency and duration  - Teach mother to massage breast before feeding/during infant pauses during feeding  - Pump breast after feeding  - Review breastfeeding discharge plan with mother   Refer to breast feeding support groups  - Initiate discussion/inform physician of weight loss and interventions taken  - Help mother initiate breast feeding within an hour of birth  - Encourage skin to skin time with  within 5 minutes of birth  - Give  no food or drink other than breast milk  - Encourage rooming in  - Encourage breast feeding on demand  - Initiate SLP consult as needed  Outcome: Progressing  Goal: Bottle fed baby will demonstrate adequate intake  Description: Interventions:  - Monitor/record daily weights and I&O  - Increase feeding frequency and volume  - Teach bottle feeding techniques to care provider/s  - Initiate discussion/inform physician of weight loss and interventions taken  - Initiate SLP consult as needed  Outcome: Progressing     Problem: RESPIRATORY -   Goal: Respiratory Rate 30-60 with no apnea, bradycardia, cyanosis or desaturations  Description: INTERVENTIONS:  - Assess respiratory rate, work of breathing, breath sounds and ability to manage secretions  - Monitor SpO2 and administer supplemental oxygen as ordered  - Document episodes of apnea, bradycardia, cyanosis and desaturations    Include all associated factors and interventions  Outcome: Progressing     Problem: SKIN/TISSUE INTEGRITY -   Goal: Skin Integrity remains intact(Skin Breakdown Prevention)  Description: INTERVENTIONS:  - Monitor for areas of redness and/or skin breakdown  - Change oxygen saturation probe site  Outcome: Progressing 2. The status of comorbities. (See ED/admit documents)

## 2022-08-23 NOTE — DISCHARGE NOTE ADULT - FUNCTIONAL SCREEN CURRENT LEVEL: DRESSING, MLM
(2) assistive person Prednisone Pregnancy And Lactation Text: This medication is Pregnancy Category C and it isn't know if it is safe during pregnancy. This medication is excreted in breast milk.

## 2023-01-12 NOTE — ED PROVIDER NOTE - NS ED NOTE AC HIGH RISK COUNTRIES
Lab Results   Component Value Date    EGFR 71 01/04/2023    EGFR 60 09/29/2022    EGFR 69 07/12/2022    CREATININE 1 05 01/04/2023    CREATININE 1 20 09/29/2022    CREATININE 1 07 07/12/2022     Stable  No

## 2023-05-09 NOTE — H&P ADULT - PROBLEM SELECTOR PROBLEM 1
Malignant neoplasm of lung, unspecified laterality, unspecified part of lung Suturegard Retention Suture: 2-0 Nylon

## 2023-11-06 NOTE — ED ADULT NURSE NOTE - PRO INTERPRETER NEED 2
Plan: Follow up as needed if no improvement or with flares.\\nWill continue care with PCP due to no longer accepting insurance. \\nSent refills to keep patient until establishes with PCP,
Detail Level: Zone
Continue Regimen: :\\n1. Clindamycin phosphate 1 % topical solution: Apply to face once at in the morning after cleansing with benzoyl peroxide as directed.\\n2. Tretinoin 0.1 % topical cream: Apply a pea sized amount at bedtime after washing with a gentle cleanser.\\n3. Spironolactone 100 mg tablet PO Sig: Take 2 pills by mouth every night.
English

## 2024-07-05 NOTE — H&P ADULT - CLICK TO LAUNCH ORM
Pt requesting refill for Ozempic to be sent to [facility]  Last OV 4/2024, next appt 8/16/24  
.

## 2024-09-09 NOTE — DIETITIAN INITIAL EVALUATION ADULT. - PROBLEM SELECTOR PLAN 1
Ultrasound Probe Disinfection    A transvaginal ultrasound was performed.   Prior to use, disinfection was performed with High Level Disinfection Process (Vendor Registry).  Probe serial number [id number] was used.    Shelley Galloway  09/09/24  12:41 PM    Admit to medicine under hospitalist medical service.  Patient with ongoing chemotherapy.  Hematology/Oncology consult.  Nutrition Consult.  Present symptoms likely result of chemotherapy.

## 2024-11-20 NOTE — ED ADULT TRIAGE NOTE - CCCP TRG CHIEF CMPLNT
CHW - Initial Contact    This Community Health Worker completed OR updated the Social Determinant of Health questionnaire with patient via telephone today.    Pt identified barriers of most importance are:  pt denied needing any help at this time, pt resently started receiving snap benefits and being seen by a [facility]iatric [facility] for mental health issues   Referrals to community agencies completed with patient/caregiver consent outside of [facility] include: no  Referrals were put through [facility] - no:   Support and Services: none  Other information discussed the patient needs / wants help with: SDOH   Follow up required: Yes  Follow-up Outreach - Due: 12/9/2024      medical evaluation

## 2024-12-19 NOTE — PATIENT PROFILE ADULT. - BILL OF RIGHTS/ADMISSION INFORMATION PROVIDED TO:
Patient called here inquiring about her EKG that was done at the clinic  Notified patient that the EKG was sinus rhythm with few erythematous.  The recent that we sent patient to emergency room of because of chest pressure that comes and goes across her chest and also numbness and tingling sensation on her face that comes and goes.  Patientis with a history of type 2 diabetes mellitus.  Referred patient that the reason we sent to ER is to rule out the reason for chest pressure and numbness and tingling sensation on her face.  Patient verbalized understanding  
Patient

## 2025-03-17 NOTE — ED PROVIDER NOTE - DATE/TIME 1
She is alert and oriented to person, place, and time.      Motor: No weakness.   Psychiatric:         Mood and Affect: Mood normal.         Behavior: Behavior normal.         FORMAL DIAGNOSTIC RESULTS     RADIOLOGY: Interpretation per the Radiologist below, if available at the time of this note (none if blank):    US OB TRANSVAGINAL   Final Result   1. Normal ultrasound of the uterus without evidence of an intrauterine   gestation.   2. Heterogeneous structure in the left ovary of indeterminate etiology, possibly   a complex cyst.   3. Small amount of free fluid in the cul-de-sac.            **This report has been created using voice recognition software. It may contain   minor errors which are inherent in voice recognition technology.**         Electronically signed by Dr. Berny Stone          LABS: (none if blank)  Labs Reviewed   COMPREHENSIVE METABOLIC PANEL - Abnormal; Notable for the following components:       Result Value    Total Bilirubin <0.2 (*)     All other components within normal limits   HCG, QUANTITATIVE, PREGNANCY - Abnormal; Notable for the following components:    hCG,Beta Subunit,Qual,Serum 213.0 (*)     All other components within normal limits   OSMOLALITY - Abnormal; Notable for the following components:    Osmolality Calc 274.7 (*)     All other components within normal limits   URINALYSIS WITH REFLEX TO CULTURE   CBC WITH AUTO DIFFERENTIAL   ANION GAP   GLOMERULAR FILTRATION RATE, ESTIMATED   ABO/RH    Narrative:     Performed at [facility] [address] [place] [address]  O  POS           (Any cultures that may have been sent were not resulted at the time of this patient visit)    MEDICAL DECISION MAKING / ED COURSE:     1) Number and Complexity of Problems            Problem List This Visit:         Chief Complaint   Patient presents with    Vaginal Bleeding            Differential Diagnosis includes (but not limited to):  1.  Implantation bleeding  2.  Miscarriage  3.   31-Jan-2018 13:55

## 2025-07-11 NOTE — PATIENT PROFILE ADULT. - BRADEN SCORE (IF 18 OR LESS ACTIVATE SKIN INJURY RISK INCREASED GUIDELINE), MLM
Brief Postoperative Note      Patient: Radha Ruelas  YOB: 1970  MRN: [id number]    Date of Procedure: 7/11/2025    Pre-Op Diagnosis Codes:      * Triangular fibrocartilage complex injury, left, initial encounter [S69.82XA]    Post-Op Diagnosis: Same       Procedure(s):  LEFT WRIST ARTHROSCOPY, TRIANGULAR FIBROCARTILAGE COMPLEX DEBRIDEMENT central TFCC tear, debridement of partial scapholunate ligament tear    Surgeon(s):  Bonifacio Cole MD    Assistant:  Surgical Assistant: Audra May    Anesthesia: General, regional    Estimated Blood Loss (mL): less than 5ml    Complications: None immediate apparent     Specimens:   none    Implants:  none      Drains: none    Findings:  Present At Time Of Surgery (PATOS) (choose all levels that have infection present):  No infection present  Other Findings: see fully dictated operative report     Electronically signed by Bonifacio Cole MD on 7/11/2025 at 8:58 AM   17